# Patient Record
Sex: MALE | Race: WHITE | NOT HISPANIC OR LATINO | Employment: FULL TIME | URBAN - METROPOLITAN AREA
[De-identification: names, ages, dates, MRNs, and addresses within clinical notes are randomized per-mention and may not be internally consistent; named-entity substitution may affect disease eponyms.]

---

## 2017-09-26 DIAGNOSIS — E78.5 HYPERLIPIDEMIA, UNSPECIFIED HYPERLIPIDEMIA TYPE: ICD-10-CM

## 2017-09-26 DIAGNOSIS — F41.8 DEPRESSION WITH ANXIETY: ICD-10-CM

## 2017-09-27 RX ORDER — ESCITALOPRAM OXALATE 20 MG/1
20 TABLET ORAL DAILY
Qty: 30 TABLET | Refills: 0 | Status: SHIPPED | OUTPATIENT
Start: 2017-09-27 | End: 2017-10-24 | Stop reason: SDUPTHER

## 2017-09-27 RX ORDER — EZETIMIBE 10 MG/1
10 TABLET ORAL DAILY
Qty: 30 TABLET | Refills: 0 | Status: SHIPPED | OUTPATIENT
Start: 2017-09-27 | End: 2017-10-24 | Stop reason: SDUPTHER

## 2017-10-17 DIAGNOSIS — E78.5 HYPERLIPIDEMIA, UNSPECIFIED HYPERLIPIDEMIA TYPE: ICD-10-CM

## 2017-10-17 RX ORDER — ATORVASTATIN CALCIUM 80 MG/1
TABLET, FILM COATED ORAL
Qty: 30 TABLET | Refills: 0 | OUTPATIENT
Start: 2017-10-17

## 2017-10-24 DIAGNOSIS — F41.8 DEPRESSION WITH ANXIETY: ICD-10-CM

## 2017-10-24 DIAGNOSIS — E78.5 HYPERLIPIDEMIA, UNSPECIFIED HYPERLIPIDEMIA TYPE: ICD-10-CM

## 2017-10-25 RX ORDER — EZETIMIBE 10 MG/1
TABLET ORAL
Qty: 30 TABLET | Refills: 0 | Status: SHIPPED | OUTPATIENT
Start: 2017-10-25 | End: 2017-11-06 | Stop reason: SDUPTHER

## 2017-10-25 RX ORDER — ESCITALOPRAM OXALATE 20 MG/1
TABLET ORAL
Qty: 30 TABLET | Refills: 0 | Status: SHIPPED | OUTPATIENT
Start: 2017-10-25 | End: 2017-11-06 | Stop reason: HOSPADM

## 2017-11-06 ENCOUNTER — OFFICE VISIT (OUTPATIENT)
Dept: INTERNAL MEDICINE | Facility: CLINIC | Age: 57
End: 2017-11-06

## 2017-11-06 VITALS
HEIGHT: 70 IN | WEIGHT: 210 LBS | HEART RATE: 98 BPM | SYSTOLIC BLOOD PRESSURE: 110 MMHG | OXYGEN SATURATION: 99 % | DIASTOLIC BLOOD PRESSURE: 70 MMHG | BODY MASS INDEX: 30.06 KG/M2

## 2017-11-06 DIAGNOSIS — Z00.00 ROUTINE PHYSICAL EXAMINATION: ICD-10-CM

## 2017-11-06 DIAGNOSIS — E03.9 HYPOTHYROIDISM, UNSPECIFIED TYPE: Chronic | ICD-10-CM

## 2017-11-06 DIAGNOSIS — Z51.81 THERAPEUTIC DRUG MONITORING: ICD-10-CM

## 2017-11-06 DIAGNOSIS — Z11.59 NEED FOR HEPATITIS C SCREENING TEST: ICD-10-CM

## 2017-11-06 DIAGNOSIS — E78.5 HYPERLIPIDEMIA, UNSPECIFIED HYPERLIPIDEMIA TYPE: Chronic | ICD-10-CM

## 2017-11-06 DIAGNOSIS — E11.9 DIABETIC EYE EXAM (HCC): Chronic | ICD-10-CM

## 2017-11-06 DIAGNOSIS — Z23 NEED FOR TDAP VACCINATION: ICD-10-CM

## 2017-11-06 DIAGNOSIS — E55.9 VITAMIN D DEFICIENCY: Chronic | ICD-10-CM

## 2017-11-06 DIAGNOSIS — F41.8 DEPRESSION WITH ANXIETY: Chronic | ICD-10-CM

## 2017-11-06 DIAGNOSIS — Z01.00 DIABETIC EYE EXAM (HCC): Chronic | ICD-10-CM

## 2017-11-06 DIAGNOSIS — E11.9 TYPE 2 DIABETES MELLITUS WITHOUT COMPLICATION, WITHOUT LONG-TERM CURRENT USE OF INSULIN (HCC): Primary | Chronic | ICD-10-CM

## 2017-11-06 DIAGNOSIS — E11.9 ENCOUNTER FOR DIABETIC FOOT EXAM (HCC): Chronic | ICD-10-CM

## 2017-11-06 DIAGNOSIS — R80.9 MICROALBUMINURIA: Chronic | ICD-10-CM

## 2017-11-06 PROCEDURE — 99214 OFFICE O/P EST MOD 30 MIN: CPT | Performed by: INTERNAL MEDICINE

## 2017-11-06 RX ORDER — EZETIMIBE 10 MG/1
TABLET ORAL
Qty: 30 TABLET | Refills: 6 | Status: SHIPPED | OUTPATIENT
Start: 2017-11-06 | End: 2018-08-27 | Stop reason: SDUPTHER

## 2017-11-06 RX ORDER — LEVOTHYROXINE SODIUM 0.1 MG/1
TABLET ORAL
Qty: 90 TABLET | Refills: 3 | Status: SHIPPED | OUTPATIENT
Start: 2017-11-06 | End: 2018-11-15 | Stop reason: SDUPTHER

## 2017-11-06 RX ORDER — ATORVASTATIN CALCIUM 80 MG/1
TABLET, FILM COATED ORAL
Qty: 30 TABLET | Refills: 6 | Status: SHIPPED | OUTPATIENT
Start: 2017-11-06 | End: 2018-05-09

## 2017-11-06 NOTE — PROGRESS NOTES
11/06/2017    Patient Information  Prudencio Mckinley                                                                                          [address] [facility] [place] [address]      1960  [phone]    Chief Complaint:     Follow-up type 2 diabetes, microalbuminuria, hypothyroidism, depression with anxiety, vitamin D deficiency.  No new acute complaints.    History of Present Illness:    Patient with a history of medical problems as outlined in the chief complaint that have been fairly stable for at least the past year.  He presents today for a routine follow-up with lab prior in order to monitor his chronic medical issues.  His past medical history reviewed and updated where necessary including health maintenance parameters.  This reveals he is behind on several health maintenance issues including diabetic eye exam, hepatitis C screening, TDaP and urine microalbumin.    Review of Systems   Constitution: Negative.   HENT: Negative.    Eyes: Negative.    Cardiovascular: Negative.    Respiratory: Negative.    Endocrine: Negative.    Hematologic/Lymphatic: Negative.    Skin: Negative.    Musculoskeletal: Negative.    Gastrointestinal: Negative.    Genitourinary: Negative.    Neurological: Negative.    Psychiatric/Behavioral: Negative.    Allergic/Immunologic: Negative.        Active Problems:    Patient Active Problem List   Diagnosis   • Depression with anxiety   • Hiatal hernia   • Hyperlipidemia   • Hypothyroidism   • Microalbuminuria   • Type 2 diabetes mellitus   • Vitamin D deficiency   • Therapeutic drug monitoring   • Routine physical examination   • Diabetic foot exam   • Diabetic eye exam         Past Medical History:   Diagnosis Date   • Depression with anxiety 2/15/2011    02/15/2011--treatment for depression and anxiety begun with Lexapro 10 mg per day, Xanax 0.5 mg when necessary.   • Hiatal hernia 7/28/2011 07/28/2011--3 cm hiatal hernia, otherwise normal EGD.    • History of chest pain 2010 2010-- patient had a heart catheterization which was normal.   • History of coronary angiogram 2010 2010-- patient had a heart catheterization which was normal.   • History of nephrolithiasis Approximately 1985    Approximately 1985--patient passed a kidney stone stone spontaneously.   • History of peptic ulcer Approximately 1979    Approximately 1979--patient was told he had a peptic ulcer and was placed on Mylanta.  It sounds as if he had an EGD.   • History of Tibialis posterior tendinitis 2014 12/01/2014--patient seen in follow-up and report his symptoms have totally resolved. He continues to use the foot inserts.   08/04/2014--patient evaluated by the podiatrist and diagnosed with tibialis tendinitis. Treated with ankle block 1} and educated on strapping adjustments for posterior tibial tendinitis. Dispensed power steps. Continue Vimovo.   07/29/2014--x-ray left foot reveals an inferio   • Hyperlipidemia 6/22/2012 06/22/2012--treatment for hyperlipidemia begun.   • Hypothyroidism 4/27/2012 05/04/2012--treatment for hypothyroidism begun.  05/01/2012--ultrasound of the thyroid revealed increased hypervascularity, slightly heterogeneous echo.   04/27/2012--initial diagnosis of hypothyroidism.   • Microalbuminuria 5/15/2015    05/15/2015--urine microalbumin slightly elevated at 19.4. Normal range 0.0--17.0.   • Type 2 diabetes mellitus 4/1/2011    05/10/2013--initial diagnosis of mild diabetes. Hemoglobin A1c 6.6.  04/01/2011--mild impaired fasting glucose.      • Vitamin D deficiency 3/21/2016         Past Surgical History:   Procedure Laterality Date   • COLONOSCOPY  07/28/2011 07/28/2011--normal colonoscopy with an excellent prep.   • ESOPHAGOSCOPY / EGD  07/28/2011 07/28/2011--3 cm hiatal hernia, otherwise normal EGD.   • TONSILLECTOMY      11 years old.         No Known Allergies        Current Outpatient Prescriptions:   •  Ascorbic Acid (VITAMIN C PO), Take  " by mouth., Disp: , Rfl:   •  atorvastatin (LIPITOR) 80 MG tablet, 1 by mouth daily for cholesterol, Disp: 30 tablet, Rfl: 6  •  Cholecalciferol (VITAMIN D3) 5000 UNITS capsule capsule, Take  by mouth., Disp: , Rfl:   •  ezetimibe (ZETIA) 10 MG tablet, TAKE 1 TABLET BY MOUTH DAILY, Disp: 30 tablet, Rfl: 0  •  levothyroxine (SYNTHROID, LEVOTHROID) 100 MCG tablet, 1 by mouth daily for thyroid, Disp: 90 tablet, Rfl: 3  •  multivitamin (THERAGRAN) tablet tablet, Take 1 tablet by mouth daily., Disp: , Rfl:       Family History   Problem Relation Age of Onset   • Coronary artery disease Mother    • Diabetes Mother    • Hypertension Mother      Essential   • Hyperlipidemia Mother    • Diabetes Father    • Hypertension Father      Essential   • Hyperlipidemia Father    • Coronary artery disease Brother    • Diabetes Brother    • Hypertension Brother      Essential   • Hyperlipidemia Brother          Social History     Social History   • Marital status:      Spouse name: N/A   • Number of children: N/A   • Years of education: N/A     Occupational History   • [employer]       Social History Main Topics   • Smoking status: Never Smoker   • Smokeless tobacco: Never Used   • Alcohol use Yes      Comment: Moderately   • Drug use: No   • Sexual activity: Yes     Partners: Female     Other Topics Concern   • Not on file     Social History Narrative         Vitals:    11/06/17 1550   BP: 110/70   Pulse: 98   SpO2: 99%   Weight: 210 lb (95.3 kg)   Height: 70\" (177.8 cm)          Physical Exam:    General: Alert and oriented x 3. Obese.  No acute distress.  Normal affect.  HEENT: Pupils equal, round, reactive to light; extraocular movements intact; sclerae nonicteric; pharynx, ear canals and TMs normal.  Neck: Without JVD, thyromegaly, bruit, or adenopathy.  Lungs: Clear to auscultation in all fields.  Heart: Regular rate and rhythm without murmur, rub, gallop, or click.  Abdomen: Soft, nontender, without " hepatosplenomegaly or hernia.  Bowel sounds normal.  : Deferred.  Rectal: Deferred.  Extremities: Without clubbing, cyanosis, edema, or pulse deficit.  Neurologic: Intact without focal deficit.  Normal station and gait observed during ingress and egress from the examination room.  Skin: Without significant lesion.  Musculoskeletal: Unremarkable.      Lab/other results:    NMR reveals total cholesterol 169.  Triglycerides 107.  LDL particle number mildly elevated 1337.  Small LDL particle number elevated at 619.  HDL particle number is normal at 33.  CMP normal except blood sugar elevated 146, AST elevated at 48, ALT elevated at 72.  CBC is normal.  Hemoglobin A1c 7.1.  Thyroid function tests normal.  PSA normal.  Vitamin D normal.  CPK slightly elevated at 276.    Assessment/Plan:     Diagnosis Plan   1. Type 2 diabetes mellitus without complication, without long-term current use of insulin     2. Microalbuminuria     3. Hypothyroidism, unspecified type     4. Hyperlipidemia, unspecified hyperlipidemia type     5. Depression with anxiety     6. Vitamin D deficiency     7. Therapeutic drug monitoring     8. Routine physical examination     9. Diabetic foot exam     10. Diabetic eye exam         Patient has type 2 diabetes that is under reasonable control.  Microalbuminuria and needs to be reassessed in the near future.  Thyroid is therapeutic.  It appears his depression with anxiety has resolved.  We will continue to monitor.  Hyperlipidemia is under fair control.  Vitamin D therapeutic.    Plan is as follows:   We will schedule physical examination with lab prior to be done in 6 months. We will going to give patient TDaP vaccination but found out we are out of stock.  We can give that a later date.  Encouraged patient to get diabetic eye exam and have the eye doctor send me the report.  Refill medications.  No changes in medications other than to formerly discontinue the citalopram.      Procedures

## 2017-12-13 DIAGNOSIS — F41.8 DEPRESSION WITH ANXIETY: ICD-10-CM

## 2017-12-14 RX ORDER — ESCITALOPRAM OXALATE 20 MG/1
TABLET ORAL
Qty: 30 TABLET | Refills: 0 | OUTPATIENT
Start: 2017-12-14

## 2017-12-16 DIAGNOSIS — F41.8 DEPRESSION WITH ANXIETY: ICD-10-CM

## 2017-12-18 RX ORDER — ESCITALOPRAM OXALATE 20 MG/1
TABLET ORAL
Qty: 30 TABLET | Refills: 0 | OUTPATIENT
Start: 2017-12-18

## 2018-01-06 DIAGNOSIS — E03.9 HYPOTHYROIDISM, UNSPECIFIED TYPE: ICD-10-CM

## 2018-01-08 RX ORDER — LEVOTHYROXINE SODIUM 0.1 MG/1
TABLET ORAL
Qty: 90 TABLET | Refills: 1 | Status: SHIPPED | OUTPATIENT
Start: 2018-01-08 | End: 2018-05-09 | Stop reason: SDUPTHER

## 2018-05-01 DIAGNOSIS — E55.9 VITAMIN D DEFICIENCY: Chronic | ICD-10-CM

## 2018-05-01 DIAGNOSIS — Z11.59 NEED FOR HEPATITIS C SCREENING TEST: ICD-10-CM

## 2018-05-01 DIAGNOSIS — E11.9 TYPE 2 DIABETES MELLITUS WITHOUT COMPLICATION, WITHOUT LONG-TERM CURRENT USE OF INSULIN (HCC): Chronic | ICD-10-CM

## 2018-05-01 DIAGNOSIS — Z51.81 THERAPEUTIC DRUG MONITORING: ICD-10-CM

## 2018-05-01 DIAGNOSIS — E03.9 HYPOTHYROIDISM, UNSPECIFIED TYPE: Chronic | ICD-10-CM

## 2018-05-01 DIAGNOSIS — E78.5 HYPERLIPIDEMIA, UNSPECIFIED HYPERLIPIDEMIA TYPE: Chronic | ICD-10-CM

## 2018-05-01 DIAGNOSIS — R80.9 MICROALBUMINURIA: Chronic | ICD-10-CM

## 2018-05-04 LAB
25(OH)D3+25(OH)D2 SERPL-MCNC: 35.2 NG/ML (ref 30–100)
ALBUMIN SERPL-MCNC: 4.3 G/DL (ref 3.5–5.2)
ALBUMIN/CREAT UR: 3.1 MG/G CREAT (ref 0–30)
ALBUMIN/GLOB SERPL: 2 G/DL
ALP SERPL-CCNC: 54 U/L (ref 39–117)
ALT SERPL-CCNC: 32 U/L (ref 1–41)
AST SERPL-CCNC: 29 U/L (ref 1–40)
BILIRUB SERPL-MCNC: 0.3 MG/DL (ref 0.1–1.2)
BUN SERPL-MCNC: 16 MG/DL (ref 6–20)
BUN/CREAT SERPL: 17 (ref 7–25)
CALCIUM SERPL-MCNC: 9.4 MG/DL (ref 8.6–10.5)
CHLORIDE SERPL-SCNC: 102 MMOL/L (ref 98–107)
CHOLEST SERPL-MCNC: 184 MG/DL (ref 100–199)
CK SERPL-CCNC: 306 U/L (ref 20–200)
CO2 SERPL-SCNC: 26.8 MMOL/L (ref 22–29)
CREAT SERPL-MCNC: 0.94 MG/DL (ref 0.76–1.27)
CREAT UR-MCNC: 189 MG/DL
ERYTHROCYTE [DISTWIDTH] IN BLOOD BY AUTOMATED COUNT: 13.6 % (ref 11.5–14.5)
GFR SERPLBLD CREATININE-BSD FMLA CKD-EPI: 100 ML/MIN/1.73
GFR SERPLBLD CREATININE-BSD FMLA CKD-EPI: 83 ML/MIN/1.73
GLOBULIN SER CALC-MCNC: 2.2 GM/DL
GLUCOSE SERPL-MCNC: 128 MG/DL (ref 65–99)
HBA1C MFR BLD: 6.3 % (ref 4.8–5.6)
HCT VFR BLD AUTO: 46.2 % (ref 40.4–52.2)
HCV AB S/CO SERPL IA: <0.1 S/CO RATIO (ref 0–0.9)
HDL SERPL-SCNC: 35.8 UMOL/L
HDLC SERPL-MCNC: 51 MG/DL
HGB BLD-MCNC: 15.4 G/DL (ref 13.7–17.6)
LDL SERPL QN: 20.9 NM
LDL SERPL-SCNC: 1478 NMOL/L
LDL SMALL SERPL-SCNC: 593 NMOL/L
LDLC SERPL CALC-MCNC: 111 MG/DL (ref 0–99)
MCH RBC QN AUTO: 30.8 PG (ref 27–32.7)
MCHC RBC AUTO-ENTMCNC: 33.3 G/DL (ref 32.6–36.4)
MCV RBC AUTO: 92.4 FL (ref 79.8–96.2)
MICROALBUMIN UR-MCNC: 5.9 UG/ML
PLATELET # BLD AUTO: 204 10*3/MM3 (ref 140–500)
POTASSIUM SERPL-SCNC: 5.2 MMOL/L (ref 3.5–5.2)
PROT SERPL-MCNC: 6.5 G/DL (ref 6–8.5)
RBC # BLD AUTO: 5 10*6/MM3 (ref 4.6–6)
SODIUM SERPL-SCNC: 141 MMOL/L (ref 136–145)
T3FREE SERPL-MCNC: 3 PG/ML (ref 2–4.4)
T4 FREE SERPL-MCNC: 1.21 NG/DL (ref 0.93–1.7)
TRIGL SERPL-MCNC: 110 MG/DL (ref 0–149)
TSH SERPL DL<=0.005 MIU/L-ACNC: 0.94 MIU/ML (ref 0.27–4.2)
WBC # BLD AUTO: 8.05 10*3/MM3 (ref 4.5–10.7)

## 2018-05-09 ENCOUNTER — OFFICE VISIT (OUTPATIENT)
Dept: INTERNAL MEDICINE | Facility: CLINIC | Age: 58
End: 2018-05-09

## 2018-05-09 VITALS
BODY MASS INDEX: 29.75 KG/M2 | HEART RATE: 62 BPM | SYSTOLIC BLOOD PRESSURE: 119 MMHG | WEIGHT: 207.8 LBS | RESPIRATION RATE: 16 BRPM | OXYGEN SATURATION: 98 % | DIASTOLIC BLOOD PRESSURE: 71 MMHG | HEIGHT: 70 IN

## 2018-05-09 DIAGNOSIS — E11.9 ENCOUNTER FOR DIABETIC FOOT EXAM (HCC): Chronic | ICD-10-CM

## 2018-05-09 DIAGNOSIS — Z01.00 DIABETIC EYE EXAM (HCC): Chronic | ICD-10-CM

## 2018-05-09 DIAGNOSIS — R80.9 MICROALBUMINURIA: Chronic | ICD-10-CM

## 2018-05-09 DIAGNOSIS — Z51.81 THERAPEUTIC DRUG MONITORING: ICD-10-CM

## 2018-05-09 DIAGNOSIS — E03.9 HYPOTHYROIDISM, UNSPECIFIED TYPE: Chronic | ICD-10-CM

## 2018-05-09 DIAGNOSIS — Z23 NEED FOR PNEUMOCOCCAL VACCINATION: ICD-10-CM

## 2018-05-09 DIAGNOSIS — Z00.00 ROUTINE PHYSICAL EXAMINATION: Primary | ICD-10-CM

## 2018-05-09 DIAGNOSIS — E11.9 TYPE 2 DIABETES MELLITUS WITHOUT COMPLICATION, WITHOUT LONG-TERM CURRENT USE OF INSULIN (HCC): Chronic | ICD-10-CM

## 2018-05-09 DIAGNOSIS — E11.9 DIABETIC EYE EXAM (HCC): Chronic | ICD-10-CM

## 2018-05-09 DIAGNOSIS — Z23 NEED FOR TDAP VACCINATION: ICD-10-CM

## 2018-05-09 DIAGNOSIS — E78.5 HYPERLIPIDEMIA, UNSPECIFIED HYPERLIPIDEMIA TYPE: Chronic | ICD-10-CM

## 2018-05-09 DIAGNOSIS — E55.9 VITAMIN D DEFICIENCY: Chronic | ICD-10-CM

## 2018-05-09 PROCEDURE — 90715 TDAP VACCINE 7 YRS/> IM: CPT | Performed by: INTERNAL MEDICINE

## 2018-05-09 PROCEDURE — 99396 PREV VISIT EST AGE 40-64: CPT | Performed by: INTERNAL MEDICINE

## 2018-05-09 PROCEDURE — 90471 IMMUNIZATION ADMIN: CPT | Performed by: INTERNAL MEDICINE

## 2018-05-09 RX ORDER — ATORVASTATIN CALCIUM 20 MG/1
TABLET, FILM COATED ORAL
Qty: 30 TABLET | Refills: 11 | Status: SHIPPED | OUTPATIENT
Start: 2018-05-09 | End: 2019-05-17 | Stop reason: SDUPTHER

## 2018-05-09 NOTE — PROGRESS NOTES
05/09/2018    Patient Information  Prudencio Mckinley                                                                                          [address] [facility] [place] [address]      1960  [phone]    Chief Complaint:     Routine annual physical examination and follow-up lab work.  No new acute complaints.    History of Present Illness:    Patient with a history of type 2 diabetes, hyperlipidemia, hypothyroidism, microalbuminuria, vitamin D deficiency.  He presents today for his routine annual exam and follow-up lab work.  Patient has a history of hyperlipidemia and was previously on Zetia along with 80 mg of Lipitor.  I did not feel he needed quite that much medication and the Lipitor was discontinued and we will reassess the lipid profile today.  His past medical history reviewed and updated where necessary including health maintenance parameters.  This reveals he needs his physical which we are doing today.  He also needs a TDaP and PPSV 23 which we can give today.  He also needs a diabetic eye exam which I encouraged him to do so.    Review of Systems   Constitution: Negative.   HENT: Negative.    Eyes: Negative.    Cardiovascular: Negative.    Respiratory: Negative.    Endocrine: Negative.    Hematologic/Lymphatic: Negative.    Skin: Negative.    Musculoskeletal: Negative.    Gastrointestinal: Negative.    Genitourinary: Negative.    Neurological: Negative.    Psychiatric/Behavioral: Negative.    Allergic/Immunologic: Negative.        Active Problems:    Patient Active Problem List   Diagnosis   • Depression with anxiety   • Hiatal hernia   • Hyperlipidemia   • Hypothyroidism   • Microalbuminuria   • Type 2 diabetes mellitus   • Vitamin D deficiency   • Therapeutic drug monitoring   • Routine physical examination   • Diabetic foot exam   • Diabetic eye exam         Past Medical History:   Diagnosis Date   • Depression with anxiety 2/15/2011    02/15/2011--treatment for  depression and anxiety begun with Lexapro 10 mg per day, Xanax 0.5 mg when necessary.   • Hiatal hernia 7/28/2011 07/28/2011--3 cm hiatal hernia, otherwise normal EGD.   • History of chest pain 2010 2010-- patient had a heart catheterization which was normal.   • History of coronary angiogram 2010 2010-- patient had a heart catheterization which was normal.   • History of nephrolithiasis Approximately 1985    Approximately 1985--patient passed a kidney stone stone spontaneously.   • History of peptic ulcer Approximately 1979    Approximately 1979--patient was told he had a peptic ulcer and was placed on Mylanta.  It sounds as if he had an EGD.   • History of Tibialis posterior tendinitis 2014 12/01/2014--patient seen in follow-up and report his symptoms have totally resolved. He continues to use the foot inserts.   08/04/2014--patient evaluated by the podiatrist and diagnosed with tibialis tendinitis. Treated with ankle block 1} and educated on strapping adjustments for posterior tibial tendinitis. Dispensed power steps. Continue Vimovo.   07/29/2014--x-ray left foot reveals an inferio   • Hyperlipidemia 6/22/2012 06/22/2012--treatment for hyperlipidemia begun.   • Hypothyroidism 4/27/2012 05/04/2012--treatment for hypothyroidism begun.  05/01/2012--ultrasound of the thyroid revealed increased hypervascularity, slightly heterogeneous echo.   04/27/2012--initial diagnosis of hypothyroidism.   • Microalbuminuria 5/15/2015    05/15/2015--urine microalbumin slightly elevated at 19.4. Normal range 0.0--17.0.   • Type 2 diabetes mellitus 4/1/2011    05/10/2013--initial diagnosis of mild diabetes. Hemoglobin A1c 6.6.  04/01/2011--mild impaired fasting glucose.      • Vitamin D deficiency 3/21/2016         Past Surgical History:   Procedure Laterality Date   • COLONOSCOPY  07/28/2011 07/28/2011--normal colonoscopy with an excellent prep.   • ESOPHAGOSCOPY / EGD  07/28/2011 07/28/2011--3 cm hiatal  "hernia, otherwise normal EGD.   • TONSILLECTOMY      11 years old.         No Known Allergies        Current Outpatient Prescriptions:   •  Ascorbic Acid (VITAMIN C PO), Take  by mouth., Disp: , Rfl:   •  Cholecalciferol (VITAMIN D3) 5000 UNITS capsule capsule, Take  by mouth., Disp: , Rfl:   •  ezetimibe (ZETIA) 10 MG tablet, 1 by mouth daily for cholesterol, Disp: 30 tablet, Rfl: 6  •  levothyroxine (SYNTHROID, LEVOTHROID) 100 MCG tablet, 1 by mouth daily for thyroid, Disp: 90 tablet, Rfl: 3  •  multivitamin (THERAGRAN) tablet tablet, Take 1 tablet by mouth daily., Disp: , Rfl:   •  atorvastatin (LIPITOR) 20 MG tablet, One by mouth daily for cholesterol, Disp: 30 tablet, Rfl: 11      Family History   Problem Relation Age of Onset   • Coronary artery disease Mother    • Diabetes Mother    • Hypertension Mother      Essential   • Hyperlipidemia Mother    • Diabetes Father    • Hypertension Father      Essential   • Hyperlipidemia Father    • Coronary artery disease Brother    • Diabetes Brother    • Hypertension Brother      Essential   • Hyperlipidemia Brother          Social History     Social History   • Marital status:      Spouse name: N/A   • Number of children: N/A   • Years of education: N/A     Occupational History   • [employer]      Social History Main Topics   • Smoking status: Never Smoker   • Smokeless tobacco: Never Used   • Alcohol use Yes      Comment: Moderately   • Drug use: No   • Sexual activity: Yes     Partners: Female     Other Topics Concern   • Not on file     Social History Narrative   • No narrative on file         Vitals:    05/09/18 1459   BP: 119/71   BP Location: Right arm   Patient Position: Sitting   Cuff Size: Adult   Pulse: 62   Resp: 16   SpO2: 98%   Weight: 94.3 kg (207 lb 12.8 oz)   Height: 177.8 cm (70\")          Physical Exam:    General: Alert and oriented x 3, with appropriate affect; no acute distress.  HEENT: pupils equal, round, and reactive to light; " extraocular movements intact; sclera nonicteric; nasal mucosa normal; pharynx normal; tympanic membranes and ear canals normal.  Neck: without JVD, thyromegaly, bruit, or adenopathy.  Lungs: clear to auscultation in all fields.  Heart: auscultation reveals regular rate and rhythm without murmur, rub, gallop, or click.  Abdomen: is soft and nontender, without hepato-splenomegaly, mass or hernia. Normal bowel sounds; .  Urologic exam: reveals normal male genitalia without testicular mass or penile/scrotal lesion.  Digital rectal exam and Prostate: deferred.  Extremities: are without clubbing, cyanosis, or edema.  Vascular: no signs of peripheral arterial disease or venous insufficiency/varicosities.  Neurological: intact without focal deficit, including cranial and peripheral nerves.  Station and gait observed to be normal during ingress and egress from the examination area.  Sensation and deep tendon reflexes tested if clinically indicated and are normal.  Musculoskeletal: exam is normal, without signs of synovitis, significant degeneration or deformity. Skin examination: without rash or significant lesions.    05/09/2018--routine diabetic foot exam reveals no evidence of diabetic foot ulcer or pre-ulcerative callus.  He does have some dry skin and hyperkeratosis and I encouraged him to use some lotion.  Distal pulses are palpable.  There is no signs of ischemia.  Sensation subjectively intact.      Lab/other results:    NMR reveals total cholesterol 184.  Triglycerides 110.  LDL particle number elevated 1478.  Small LDL particle number elevated at 593.  HDL particle number normal at 35.8.  CMP normal except blood sugar elevated at 128.  CBC is normal.  Albumin/creatinine ratio normal at 3.1.  Hemoglobin A1c excellent at 6.3.  Thyroid function tests normal.  Vitamin D normal.  Hepatitis C antibody screening is negative.  CPK slightly elevated at 306.    Assessment/Plan:     Diagnosis Plan   1. Routine physical  examination     2. Type 2 diabetes mellitus without complication, without long-term current use of insulin     3. Hyperlipidemia, unspecified hyperlipidemia type  atorvastatin (LIPITOR) 20 MG tablet   4. Hypothyroidism, unspecified type     5. Microalbuminuria     6. Vitamin D deficiency     7. Diabetic foot exam     8. Diabetic eye exam     9. Therapeutic drug monitoring     10. Need for pneumococcal vaccination     11. Need for Tdap vaccination       Patient presents with essentially normal annual physical except for the following issues: He has type 2 diabetes that is much better than it was previously and technically he now has impaired fasting glucose although I will not change the diagnosis at the present time.  He has hyperlipidemia that is not quite at goal and I do not think he needs 80 mg of Lipitor.  He should get by with a much lower dose.  Thyroid is therapeutic.  Microalbuminuria is nonexistent at the present time.  Vitamin D is therapeutic.  Diabetic foot exam is documented above.    Plan is as follows: Patient should continue diet and weight loss efforts along with exercise.  TDaP and PPSV 23 given today.  Patient should wait about a week or so before getting the hepatitis A vaccination at the local drug store.  Start generic Lipitor 20 mg per day.  Patient will follow-up in 6 months with lab prior or follow-up as needed.    At the end of the visit I realize the PSA was not done.  His last PSA was done about 6 months ago but I will repeat one today.  Patient will follow-up on the phone for the results.    Procedures

## 2018-05-10 LAB — PSA SERPL-MCNC: 1.03 NG/ML (ref 0–4)

## 2018-08-27 DIAGNOSIS — E78.5 HYPERLIPIDEMIA, UNSPECIFIED HYPERLIPIDEMIA TYPE: Chronic | ICD-10-CM

## 2018-08-28 RX ORDER — EZETIMIBE 10 MG/1
TABLET ORAL
Qty: 30 TABLET | Refills: 4 | Status: SHIPPED | OUTPATIENT
Start: 2018-08-28 | End: 2019-02-09 | Stop reason: SDUPTHER

## 2018-11-01 DIAGNOSIS — E78.5 HYPERLIPIDEMIA, UNSPECIFIED HYPERLIPIDEMIA TYPE: Chronic | ICD-10-CM

## 2018-11-01 DIAGNOSIS — E03.9 HYPOTHYROIDISM, UNSPECIFIED TYPE: Chronic | ICD-10-CM

## 2018-11-01 DIAGNOSIS — E11.9 TYPE 2 DIABETES MELLITUS WITHOUT COMPLICATION, WITHOUT LONG-TERM CURRENT USE OF INSULIN (HCC): Chronic | ICD-10-CM

## 2018-11-05 LAB
ALBUMIN SERPL-MCNC: 4.3 G/DL (ref 3.5–5.2)
ALBUMIN/GLOB SERPL: 1.9 G/DL
ALP SERPL-CCNC: 56 U/L (ref 39–117)
ALT SERPL-CCNC: 48 U/L (ref 1–41)
AST SERPL-CCNC: 28 U/L (ref 1–40)
BILIRUB SERPL-MCNC: 0.3 MG/DL (ref 0.1–1.2)
BUN SERPL-MCNC: 13 MG/DL (ref 6–20)
BUN/CREAT SERPL: 14.8 (ref 7–25)
CALCIUM SERPL-MCNC: 9.6 MG/DL (ref 8.6–10.5)
CHLORIDE SERPL-SCNC: 102 MMOL/L (ref 98–107)
CHOLEST SERPL-MCNC: 148 MG/DL (ref 100–199)
CK SERPL-CCNC: 112 U/L (ref 20–200)
CO2 SERPL-SCNC: 26.8 MMOL/L (ref 22–29)
CREAT SERPL-MCNC: 0.88 MG/DL (ref 0.76–1.27)
GLOBULIN SER CALC-MCNC: 2.3 GM/DL
GLUCOSE SERPL-MCNC: 143 MG/DL (ref 65–99)
HBA1C MFR BLD: 6.8 % (ref 4.8–5.6)
HDL SERPL-SCNC: 43.2 UMOL/L
HDLC SERPL-MCNC: 56 MG/DL
LDL SERPL QN: 20 NM
LDL SERPL-SCNC: 1040 NMOL/L
LDL SMALL SERPL-SCNC: 798 NMOL/L
LDLC SERPL CALC-MCNC: 57 MG/DL (ref 0–99)
POTASSIUM SERPL-SCNC: 5.2 MMOL/L (ref 3.5–5.2)
PROT SERPL-MCNC: 6.6 G/DL (ref 6–8.5)
SODIUM SERPL-SCNC: 142 MMOL/L (ref 136–145)
T3FREE SERPL-MCNC: 3.2 PG/ML (ref 2–4.4)
T4 FREE SERPL-MCNC: 1.37 NG/DL (ref 0.93–1.7)
TRIGL SERPL-MCNC: 175 MG/DL (ref 0–149)
TSH SERPL DL<=0.005 MIU/L-ACNC: 0.98 MIU/ML (ref 0.27–4.2)

## 2018-11-09 ENCOUNTER — OFFICE VISIT (OUTPATIENT)
Dept: INTERNAL MEDICINE | Facility: CLINIC | Age: 58
End: 2018-11-09

## 2018-11-09 VITALS
SYSTOLIC BLOOD PRESSURE: 122 MMHG | BODY MASS INDEX: 29.15 KG/M2 | HEART RATE: 77 BPM | OXYGEN SATURATION: 98 % | DIASTOLIC BLOOD PRESSURE: 70 MMHG | HEIGHT: 70 IN | WEIGHT: 203.6 LBS

## 2018-11-09 DIAGNOSIS — E55.9 VITAMIN D DEFICIENCY: Chronic | ICD-10-CM

## 2018-11-09 DIAGNOSIS — R80.9 MICROALBUMINURIA: Chronic | ICD-10-CM

## 2018-11-09 DIAGNOSIS — Z51.81 THERAPEUTIC DRUG MONITORING: ICD-10-CM

## 2018-11-09 DIAGNOSIS — E78.5 HYPERLIPIDEMIA, UNSPECIFIED HYPERLIPIDEMIA TYPE: Chronic | ICD-10-CM

## 2018-11-09 DIAGNOSIS — E03.9 HYPOTHYROIDISM, UNSPECIFIED TYPE: Chronic | ICD-10-CM

## 2018-11-09 DIAGNOSIS — E11.9 TYPE 2 DIABETES MELLITUS WITHOUT COMPLICATION, WITHOUT LONG-TERM CURRENT USE OF INSULIN (HCC): Primary | Chronic | ICD-10-CM

## 2018-11-09 DIAGNOSIS — Z00.00 ROUTINE PHYSICAL EXAMINATION: ICD-10-CM

## 2018-11-09 PROCEDURE — 99214 OFFICE O/P EST MOD 30 MIN: CPT | Performed by: INTERNAL MEDICINE

## 2018-11-09 NOTE — PROGRESS NOTES
11/09/2018    Patient Information  Prudencio Mckinley                                                                                          [address] [facility] [place] [address]      1960  [phone] (work)    Chief Complaint:     Follow-up type 2 diabetes, hyperlipidemia, hypothyroidism, microalbuminuria, vitamin D deficiency.  No new acute complaints.    History of Present Illness:    Patient with a history of medical problems as outlined in chief complaint presents today for a follow-up with lab prior in order to monitor his chronic medical issues.  He currently is feeling well and has no new acute complaints.  Patient is active and works every day as .  His past medical history reviewed and updated where necessary including health maintenance parameters.  This reveals he is up-to-date or else accounted for with the exception of the new shingles vaccine.  I recommended that he obtain this at the local drug store as soon it is available and off back order.    Review of Systems   Constitution: Negative.   HENT: Negative.    Eyes: Negative.    Cardiovascular: Negative.    Respiratory: Negative.    Endocrine: Negative.    Hematologic/Lymphatic: Negative.    Skin: Negative.    Musculoskeletal: Negative.    Gastrointestinal: Negative.    Genitourinary: Negative.    Neurological: Negative.    Psychiatric/Behavioral: Negative.    Allergic/Immunologic: Negative.        Active Problems:    Patient Active Problem List   Diagnosis   • Depression with anxiety   • Hiatal hernia   • Hyperlipidemia   • Hypothyroidism   • Microalbuminuria   • Type 2 diabetes mellitus (CMS/HCC)   • Vitamin D deficiency   • Therapeutic drug monitoring   • Routine physical examination   • Diabetic foot exam   • Diabetic eye exam (CMS/HCC)         Past Medical History:   Diagnosis Date   • Depression with anxiety 2/15/2011    02/15/2011--treatment for depression and anxiety begun with Lexapro 10 mg  per day, Xanax 0.5 mg when necessary.   • Hiatal hernia 7/28/2011 07/28/2011--3 cm hiatal hernia, otherwise normal EGD.   • History of coronary angiogram 2010 2010-- patient had a heart catheterization which was normal.   • History of nephrolithiasis Approximately 1985    Approximately 1985--patient passed a kidney stone stone spontaneously.   • History of peptic ulcer Approximately 1979    Approximately 1979--patient was told he had a peptic ulcer and was placed on Mylanta.  It sounds as if he had an EGD.   • Hyperlipidemia 6/22/2012 06/22/2012--treatment for hyperlipidemia begun.   • Hypothyroidism 4/27/2012 05/04/2012--treatment for hypothyroidism begun.  05/01/2012--ultrasound of the thyroid revealed increased hypervascularity, slightly heterogeneous echo.   04/27/2012--initial diagnosis of hypothyroidism.   • Microalbuminuria 5/15/2015    05/15/2015--urine microalbumin slightly elevated at 19.4. Normal range 0.0--17.0.   • Type 2 diabetes mellitus (CMS/[facility]) 4/1/2011    05/10/2013--initial diagnosis of mild diabetes. Hemoglobin A1c 6.6.  04/01/2011--mild impaired fasting glucose.      • Vitamin D deficiency 3/21/2016         Past Surgical History:   Procedure Laterality Date   • COLONOSCOPY  07/28/2011 07/28/2011--normal colonoscopy with an excellent prep.   • ESOPHAGOSCOPY / EGD  07/28/2011 07/28/2011--3 cm hiatal hernia, otherwise normal EGD.   • TONSILLECTOMY      11 years old.         No Known Allergies        Current Outpatient Prescriptions:   •  Ascorbic Acid (VITAMIN C PO), Take  by mouth., Disp: , Rfl:   •  atorvastatin (LIPITOR) 20 MG tablet, One by mouth daily for cholesterol, Disp: 30 tablet, Rfl: 11  •  Cholecalciferol (VITAMIN D3) 5000 UNITS capsule capsule, Take  by mouth., Disp: , Rfl:   •  ezetimibe (ZETIA) 10 MG tablet, TAKE 1 TABLET BY MOUTH DAILY FOR CHOLESTEROL, Disp: 30 tablet, Rfl: 4  •  levothyroxine (SYNTHROID, LEVOTHROID) 100 MCG tablet, 1 by mouth daily for thyroid,  "Disp: 90 tablet, Rfl: 3  •  multivitamin (THERAGRAN) tablet tablet, Take 1 tablet by mouth daily., Disp: , Rfl:       Family History   Problem Relation Age of Onset   • Coronary artery disease Mother    • Diabetes Mother    • Hypertension Mother         Essential   • Hyperlipidemia Mother    • Diabetes Father    • Hypertension Father         Essential   • Hyperlipidemia Father    • Coronary artery disease Brother    • Diabetes Brother    • Hypertension Brother         Essential   • Hyperlipidemia Brother          Social History     Social History   • Marital status:      Spouse name: N/A   • Number of children: N/A   • Years of education: N/A     Occupational History   • [employer]      Social History Main Topics   • Smoking status: Never Smoker   • Smokeless tobacco: Never Used   • Alcohol use Yes      Comment: Moderately   • Drug use: No   • Sexual activity: Yes     Partners: Female     Other Topics Concern   • Not on file     Social History Narrative   • No narrative on file         Vitals:    11/09/18 1606   BP: 122/70   BP Location: Right arm   Pulse: 77   SpO2: 98%   Weight: 92.4 kg (203 lb 9.6 oz)   Height: 177.8 cm (70\")          Physical Exam:    General: Alert and oriented x 3.  No acute distress.  Normal affect.  HEENT: Pupils equal, round, reactive to light; extraocular movements intact; sclerae nonicteric; pharynx, ear canals and TMs normal.  Neck: Without JVD, thyromegaly, bruit, or adenopathy.  Lungs: Clear to auscultation in all fields.  Heart: Regular rate and rhythm without murmur, rub, gallop, or click.  Abdomen: Soft, nontender, without hepatosplenomegaly or hernia.  Bowel sounds normal.  : Deferred.  Rectal: Deferred.  Extremities: Without clubbing, cyanosis, edema, or pulse deficit.  Neurologic: Intact without focal deficit.  Normal station and gait observed during ingress and egress from the examination room.  Skin: Without significant lesion.  Musculoskeletal: " Unremarkable.      Lab/other results:    NMR reveals total cholesterol 148.  Glycerides mildly elevated at 175.  LDL particle number borderline elevated 1040.  Small LDL particle number elevated at 798.  HDL particle number is very good at 43.2.  CMP normal except blood sugar elevated at 143.  ALT is slightly elevated at 48.  Hemoglobin A1c elevated at 6.8.  Thyroid function tests are normal.    Assessment/Plan:     Diagnosis Plan   1. Type 2 diabetes mellitus without complication, without long-term current use of insulin (CMS/[facility])     2. Hyperlipidemia, unspecified hyperlipidemia type     3. Hypothyroidism, unspecified type     4. Microalbuminuria     5. Vitamin D deficiency     6. Therapeutic drug monitoring     7. Routine physical examination       Patient has type 2 diabetes that is mild and does not require medication.  Hyperlipidemia is under reasonable control.  Thyroid is therapeutic on the current dose.  Microalbuminuria has been mild and stable.  Vitamin D has been therapeutic.    Plan is as follows: Strongly recommend patient get diabetic eye exam.  Recommend diet, exercise, and weight loss.  I will have him follow-up after 05/09/2019 with lab prior for his annual physical exam.  If things appear to be in good control at that time that I think it would be reasonable for him to follow-up on a yearly basis as long as he can keep his hemoglobin A1c less than 7.        Procedures

## 2018-11-15 DIAGNOSIS — E03.9 HYPOTHYROIDISM, UNSPECIFIED TYPE: Chronic | ICD-10-CM

## 2018-11-15 RX ORDER — LEVOTHYROXINE SODIUM 0.1 MG/1
TABLET ORAL
Qty: 90 TABLET | Refills: 1 | Status: SHIPPED | OUTPATIENT
Start: 2018-11-15 | End: 2019-05-27 | Stop reason: SDUPTHER

## 2019-02-09 DIAGNOSIS — E78.5 HYPERLIPIDEMIA, UNSPECIFIED HYPERLIPIDEMIA TYPE: Chronic | ICD-10-CM

## 2019-02-11 RX ORDER — EZETIMIBE 10 MG/1
TABLET ORAL
Qty: 30 TABLET | Refills: 3 | Status: SHIPPED | OUTPATIENT
Start: 2019-02-11 | End: 2019-06-18 | Stop reason: SDUPTHER

## 2019-05-08 DIAGNOSIS — E78.5 HYPERLIPIDEMIA, UNSPECIFIED HYPERLIPIDEMIA TYPE: Chronic | ICD-10-CM

## 2019-05-08 DIAGNOSIS — R80.9 MICROALBUMINURIA: Chronic | ICD-10-CM

## 2019-05-08 DIAGNOSIS — E11.9 TYPE 2 DIABETES MELLITUS WITHOUT COMPLICATION, WITHOUT LONG-TERM CURRENT USE OF INSULIN (HCC): Chronic | ICD-10-CM

## 2019-05-08 DIAGNOSIS — E03.9 HYPOTHYROIDISM, UNSPECIFIED TYPE: Chronic | ICD-10-CM

## 2019-05-08 DIAGNOSIS — E55.9 VITAMIN D DEFICIENCY: Chronic | ICD-10-CM

## 2019-05-08 DIAGNOSIS — Z00.00 ROUTINE PHYSICAL EXAMINATION: ICD-10-CM

## 2019-05-10 LAB
25(OH)D3+25(OH)D2 SERPL-MCNC: 38.5 NG/ML (ref 30–100)
ALBUMIN SERPL-MCNC: 4.3 G/DL (ref 3.5–5.2)
ALBUMIN/CREAT UR: 3.6 MG/G CREAT (ref 0–30)
ALBUMIN/GLOB SERPL: 1.9 G/DL
ALP SERPL-CCNC: 66 U/L (ref 39–117)
ALT SERPL-CCNC: 55 U/L (ref 1–41)
AST SERPL-CCNC: 42 U/L (ref 1–40)
BILIRUB SERPL-MCNC: 0.4 MG/DL (ref 0.2–1.2)
BUN SERPL-MCNC: 12 MG/DL (ref 6–20)
BUN/CREAT SERPL: 13.5 (ref 7–25)
CALCIUM SERPL-MCNC: 10.6 MG/DL (ref 8.6–10.5)
CHLORIDE SERPL-SCNC: 104 MMOL/L (ref 98–107)
CHOLEST SERPL-MCNC: 133 MG/DL (ref 100–199)
CK SERPL-CCNC: 801 U/L (ref 20–200)
CO2 SERPL-SCNC: 27.7 MMOL/L (ref 22–29)
CREAT SERPL-MCNC: 0.89 MG/DL (ref 0.76–1.27)
CREAT UR-MCNC: 96.9 MG/DL
ERYTHROCYTE [DISTWIDTH] IN BLOOD BY AUTOMATED COUNT: 12.8 % (ref 12.3–15.4)
GLOBULIN SER CALC-MCNC: 2.3 GM/DL
GLUCOSE SERPL-MCNC: 130 MG/DL (ref 65–99)
HBA1C MFR BLD: 7.2 % (ref 4.8–5.6)
HCT VFR BLD AUTO: 49.1 % (ref 37.5–51)
HDL SERPL-SCNC: 33.4 UMOL/L
HDLC SERPL-MCNC: 50 MG/DL
HGB BLD-MCNC: 15.9 G/DL (ref 13–17.7)
LDL SERPL QN: 20.3 NM
LDL SERPL-SCNC: 942 NMOL/L
LDL SMALL SERPL-SCNC: 519 NMOL/L
LDLC SERPL CALC-MCNC: 63 MG/DL (ref 0–99)
MCH RBC QN AUTO: 29.6 PG (ref 26.6–33)
MCHC RBC AUTO-ENTMCNC: 32.4 G/DL (ref 31.5–35.7)
MCV RBC AUTO: 91.4 FL (ref 79–97)
MICROALBUMIN UR-MCNC: 3.5 UG/ML
PLATELET # BLD AUTO: 216 10*3/MM3 (ref 140–450)
POTASSIUM SERPL-SCNC: 5.9 MMOL/L (ref 3.5–5.2)
PROT SERPL-MCNC: 6.6 G/DL (ref 6–8.5)
PSA SERPL-MCNC: 0.94 NG/ML (ref 0–4)
RBC # BLD AUTO: 5.37 10*6/MM3 (ref 4.14–5.8)
SODIUM SERPL-SCNC: 143 MMOL/L (ref 136–145)
T3FREE SERPL-MCNC: 3.4 PG/ML (ref 2–4.4)
T4 FREE SERPL-MCNC: 1.34 NG/DL (ref 0.93–1.7)
TRIGL SERPL-MCNC: 101 MG/DL (ref 0–149)
TSH SERPL DL<=0.005 MIU/L-ACNC: 1.65 MIU/ML (ref 0.27–4.2)
WBC # BLD AUTO: 7.83 10*3/MM3 (ref 3.4–10.8)

## 2019-05-17 DIAGNOSIS — E78.5 HYPERLIPIDEMIA, UNSPECIFIED HYPERLIPIDEMIA TYPE: Chronic | ICD-10-CM

## 2019-05-18 RX ORDER — ATORVASTATIN CALCIUM 20 MG/1
TABLET, FILM COATED ORAL
Qty: 30 TABLET | Refills: 0 | Status: SHIPPED | OUTPATIENT
Start: 2019-05-18 | End: 2019-06-18 | Stop reason: SDUPTHER

## 2019-05-27 DIAGNOSIS — E03.9 HYPOTHYROIDISM, UNSPECIFIED TYPE: Chronic | ICD-10-CM

## 2019-05-28 RX ORDER — LEVOTHYROXINE SODIUM 0.1 MG/1
TABLET ORAL
Qty: 90 TABLET | Refills: 0 | Status: SHIPPED | OUTPATIENT
Start: 2019-05-28 | End: 2019-09-07 | Stop reason: SDUPTHER

## 2019-05-29 ENCOUNTER — OFFICE VISIT (OUTPATIENT)
Dept: INTERNAL MEDICINE | Facility: CLINIC | Age: 59
End: 2019-05-29

## 2019-05-29 VITALS
OXYGEN SATURATION: 98 % | WEIGHT: 200.2 LBS | HEART RATE: 64 BPM | BODY MASS INDEX: 28.66 KG/M2 | SYSTOLIC BLOOD PRESSURE: 112 MMHG | HEIGHT: 70 IN | DIASTOLIC BLOOD PRESSURE: 72 MMHG

## 2019-05-29 DIAGNOSIS — E11.9 TYPE 2 DIABETES MELLITUS WITHOUT COMPLICATION, WITHOUT LONG-TERM CURRENT USE OF INSULIN (HCC): Chronic | ICD-10-CM

## 2019-05-29 DIAGNOSIS — R74.8 ELEVATED CPK: ICD-10-CM

## 2019-05-29 DIAGNOSIS — E78.5 HYPERLIPIDEMIA, UNSPECIFIED HYPERLIPIDEMIA TYPE: Chronic | ICD-10-CM

## 2019-05-29 DIAGNOSIS — Z00.00 ROUTINE PHYSICAL EXAMINATION: Primary | ICD-10-CM

## 2019-05-29 DIAGNOSIS — E83.52 HYPERCALCEMIA: ICD-10-CM

## 2019-05-29 DIAGNOSIS — R80.9 MICROALBUMINURIA: Chronic | ICD-10-CM

## 2019-05-29 DIAGNOSIS — E11.9 ENCOUNTER FOR DIABETIC FOOT EXAM (HCC): Chronic | ICD-10-CM

## 2019-05-29 DIAGNOSIS — E03.9 HYPOTHYROIDISM, UNSPECIFIED TYPE: Chronic | ICD-10-CM

## 2019-05-29 DIAGNOSIS — E11.9 DIABETIC EYE EXAM (HCC): Chronic | ICD-10-CM

## 2019-05-29 DIAGNOSIS — E87.5 HYPERKALEMIA: ICD-10-CM

## 2019-05-29 DIAGNOSIS — E55.9 VITAMIN D DEFICIENCY: Chronic | ICD-10-CM

## 2019-05-29 DIAGNOSIS — Z51.81 THERAPEUTIC DRUG MONITORING: ICD-10-CM

## 2019-05-29 DIAGNOSIS — Z01.00 DIABETIC EYE EXAM (HCC): Chronic | ICD-10-CM

## 2019-05-29 PROCEDURE — 99396 PREV VISIT EST AGE 40-64: CPT | Performed by: INTERNAL MEDICINE

## 2019-05-29 NOTE — PROGRESS NOTES
05/29/2019    Patient Information  Prudencio Mckinley                                                                                          [address] [place] [address]      1960  [unfilled]  [phone] (work)    Chief Complaint:     Routine annual physical examination and follow-up lab work.  Complaining of recent chest congestion and cough that is getting better and we will not addressed formally.    History of Present Illness:    Patient with a history of hyperlipidemia, type 2 diabetes, hypothyroidism, microalbuminuria and hiatal hernia.  He presents today for his routine annual physical exam and follow-up lab work.  He reports he is feeling well except a recent episode of what sounds like bronchitis.  Patient breathing then some mariluz material at work and subsequently developed chest congestion and cough productive of green phlegm.  It is much better now and patient does not feel like we need to address or treat this.  Indeed, his lungs are clear today.  His past medical history reviewed and updated were necessary including health maintenance parameters.  This reveals he will be up-to-date or else accounted for after today's visit.  Patient did have a diabetic eye exam but it is not documented in the computer and we are obtaining that information.    Review of Systems   Constitution: Negative.   HENT: Negative.    Eyes: Negative.    Cardiovascular: Negative.    Respiratory: Positive for cough and sputum production.    Endocrine: Negative.    Hematologic/Lymphatic: Negative.    Skin: Negative.    Musculoskeletal: Negative.    Gastrointestinal: Negative.    Genitourinary: Negative.    Neurological: Negative.    Psychiatric/Behavioral: Negative.    Allergic/Immunologic: Negative.        Active Problems:    Patient Active Problem List   Diagnosis   • Hiatal hernia   • Hyperlipidemia   • Hypothyroidism   • Microalbuminuria   • Type 2 diabetes mellitus (CMS/[facility])   • Vitamin D deficiency   •  Therapeutic drug monitoring   • Routine physical examination   • Diabetic foot exam   • Diabetic eye exam (CMS/[facility])   • Hypercalcemia   • Hyperkalemia   • Elevated CPK         Past Medical History:   Diagnosis Date   • Hiatal hernia 7/28/2011 07/28/2011--3 cm hiatal hernia, otherwise normal EGD.   • History of coronary angiogram 2010 2010-- patient had a heart catheterization which was normal.   • History of nephrolithiasis Approximately 1985    Approximately 1985--patient passed a kidney stone stone spontaneously.   • History of peptic ulcer Approximately 1979    Approximately 1979--patient was told he had a peptic ulcer and was placed on Mylanta.  It sounds as if he had an EGD.   • Hyperlipidemia 6/22/2012 06/22/2012--treatment for hyperlipidemia begun.   • Hypothyroidism 4/27/2012 05/04/2012--treatment for hypothyroidism begun.  05/01/2012--ultrasound of the thyroid revealed increased hypervascularity, slightly heterogeneous echo.   04/27/2012--initial diagnosis of hypothyroidism.   • Microalbuminuria 5/15/2015    05/15/2015--urine microalbumin slightly elevated at 19.4. Normal range 0.0--17.0.   • Type 2 diabetes mellitus (CMS/[facility]) 4/1/2011    05/10/2013--initial diagnosis of mild diabetes. Hemoglobin A1c 6.6.  04/01/2011--mild impaired fasting glucose.      • Vitamin D deficiency 3/21/2016         Past Surgical History:   Procedure Laterality Date   • COLONOSCOPY  07/28/2011 07/28/2011--normal colonoscopy with an excellent prep.   • ESOPHAGOSCOPY / EGD  07/28/2011 07/28/2011--3 cm hiatal hernia, otherwise normal EGD.   • TONSILLECTOMY      11 years old.         No Known Allergies        Current Outpatient Medications:   •  Ascorbic Acid (VITAMIN C PO), Take  by mouth., Disp: , Rfl:   •  atorvastatin (LIPITOR) 20 MG tablet, TAKE 1 TABLET BY MOUTH DAILY FOR CHOLESTEROL, Disp: 30 tablet, Rfl: 0  •  Cholecalciferol (VITAMIN D3) 5000 UNITS capsule capsule, Take  by mouth., Disp: , Rfl:   •   ezetimibe (ZETIA) 10 MG tablet, TAKE 1 TABLET BY MOUTH DAILY FOR CHOLESTEROL, Disp: 30 tablet, Rfl: 3  •  levothyroxine (SYNTHROID, LEVOTHROID) 100 MCG tablet, TAKE 1 TABLET BY MOUTH DAILY FOR THYROID, Disp: 90 tablet, Rfl: 0  •  multivitamin (THERAGRAN) tablet tablet, Take 1 tablet by mouth daily., Disp: , Rfl:       Family History   Problem Relation Age of Onset   • Coronary artery disease Mother    • Diabetes Mother    • Hypertension Mother         Essential   • Hyperlipidemia Mother    • Diabetes Father    • Hypertension Father         Essential   • Hyperlipidemia Father    • Coronary artery disease Brother    • Diabetes Brother    • Hypertension Brother         Essential   • Hyperlipidemia Brother          Social History     Socioeconomic History   • Marital status:      Spouse name: Not on file   • Number of children: Not on file   • Years of education: Not on file   • Highest education level: Not on file   Occupational History   • Occupation: [employer]    Social Needs   • Financial resource strain: Not hard at all   • Food insecurity:     Worry: Never true     Inability: Never true   • Transportation needs:     Medical: No     Non-medical: No   Tobacco Use   • Smoking status: Former Smoker     Types: Cigars   • Smokeless tobacco: Never Used   Substance and Sexual Activity   • Alcohol use: Yes     Frequency: 4 or more times a week     Drinks per session: 3 or 4     Binge frequency: Never     Comment: Moderately   • Drug use: No   • Sexual activity: Yes     Partners: Female   Lifestyle   • Physical activity:     Days per week: 4 days     Minutes per session: 60 min   • Stress: Not at all   Relationships   • Social connections:     Talks on phone: More than three times a week     Gets together: Once a week     Attends Buddhist service: Never     Active member of club or organization: Yes     Attends meetings of clubs or organizations: Never     Relationship status:          Vitals:     "05/29/19 1503   BP: 112/72   Pulse: 64   SpO2: 98%   Weight: 90.8 kg (200 lb 3.2 oz)   Height: 177.8 cm (70\")          Physical Exam:    General: Alert and oriented x 3.  No acute distress.  Normal affect.  HEENT: Pupils equal, round, reactive to light; extraocular movements intact; sclerae nonicteric; pharynx, ear canals and TMs normal.  Neck: Without JVD, thyromegaly, bruit, or adenopathy.  Lungs: Clear to auscultation in all fields.  Heart: Regular rate and rhythm without murmur, rub, gallop, or click.  Abdomen: Soft, nontender, without hepatosplenomegaly or hernia.  Bowel sounds normal.  : Deferred.  Rectal: Deferred.  Extremities: Without clubbing, cyanosis, edema, or pulse deficit.  Neurologic: Intact without focal deficit.  Normal station and gait observed during ingress and egress from the examination room.  Skin: Without significant lesion.  Musculoskeletal: Unremarkable.    May 29, 2019--routine diabetic foot exam reveals no evidence of diabetic foot ulcer or pre-ulcerative callus.  Distal pulses are palpable and sensation subjectively intact.    Lab/other results:    NMR is essentially perfect.  CMP normal except blood sugar 130, potassium 5.9, calcium slightly elevated at 10.6, AST elevated at 42, ALT elevated at 55.  CBC is normal.  Albumin/creatinine ratio is normal.  Hemoglobin A1c 7.2.  Thyroid function tests are normal.  PSA normal at 0.941.  Vitamin D normal.  CPK elevated at 801.    Assessment/Plan:     Diagnosis Plan   1. Routine physical examination     2. Type 2 diabetes mellitus without complication, without long-term current use of insulin (CMS/[facility])     3. Diabetic foot exam     4. Vitamin D deficiency     5. Microalbuminuria     6. Hypothyroidism, unspecified type     7. Hyperlipidemia, unspecified hyperlipidemia type     8. Diabetic eye exam (CMS/[facility])     9. Therapeutic drug monitoring     10. Hypercalcemia     11. Hyperkalemia     12. Elevated CPK       Patient presents with essentially " normal annual except for the following issues: He has type 2 diabetes which is diet controlled and under good control.  Diabetic foot exam is normal.  Vitamin D is therapeutic.  Microalbuminuria is currently negative.  Thyroid is therapeutic.  Hyperlipidemia is under excellent control.  Patient is up-to-date on his diabetic eye exam but we are attempting to obtain that documentation.  Patient is noted to have an elevated calcium and elevated potassium level and I think this is likely laboratory error but needs to be reevaluated.  His liver enzymes are up slightly as well as his CPK.  We will repeat those labs before proceeding with further work-up.     Several preventative health issues discussed including review of vaccinations and recommendations, including dietary issues, exercise and weight loss.  Safe sex practices discussed.  Patient advised to wear seatbelt whenever driving and avoid texting and driving.  Also advised to look both ways before crossing the street.  Colon cancer prevention discussed and is up-to-date with colonoscopy.  Advised to avoid tobacco products and minimize alcohol consumption.    Plan is as follows: Recheck CMP and CPK.  Patient will follow-up on the phone for the results and possible further instructions.  No changes in current medical regimen.  Patient encouraged to follow low carbohydrate diet and lose weight.  Exercise.  We will schedule lab and follow-up in about 6 months.  I explained to patient that if his A1c was not better at that time then we may need to consider a pill for his diabetes.        Procedures

## 2019-06-18 DIAGNOSIS — E78.5 HYPERLIPIDEMIA, UNSPECIFIED HYPERLIPIDEMIA TYPE: Chronic | ICD-10-CM

## 2019-06-19 RX ORDER — ATORVASTATIN CALCIUM 20 MG/1
TABLET, FILM COATED ORAL
Qty: 30 TABLET | Refills: 5 | Status: SHIPPED | OUTPATIENT
Start: 2019-06-19 | End: 2019-12-30

## 2019-06-19 RX ORDER — EZETIMIBE 10 MG/1
TABLET ORAL
Qty: 30 TABLET | Refills: 5 | Status: SHIPPED | OUTPATIENT
Start: 2019-06-19 | End: 2019-12-30

## 2019-09-07 DIAGNOSIS — E03.9 HYPOTHYROIDISM, UNSPECIFIED TYPE: Chronic | ICD-10-CM

## 2019-09-07 RX ORDER — LEVOTHYROXINE SODIUM 0.1 MG/1
TABLET ORAL
Qty: 90 TABLET | Refills: 0 | Status: SHIPPED | OUTPATIENT
Start: 2019-09-07 | End: 2019-12-13 | Stop reason: SDUPTHER

## 2019-11-26 DIAGNOSIS — R74.8 ELEVATED CPK: ICD-10-CM

## 2019-11-26 DIAGNOSIS — E11.9 TYPE 2 DIABETES MELLITUS WITHOUT COMPLICATION, WITHOUT LONG-TERM CURRENT USE OF INSULIN (HCC): Chronic | ICD-10-CM

## 2019-11-26 DIAGNOSIS — E78.5 HYPERLIPIDEMIA, UNSPECIFIED HYPERLIPIDEMIA TYPE: Chronic | ICD-10-CM

## 2019-11-26 DIAGNOSIS — E83.52 HYPERCALCEMIA: ICD-10-CM

## 2019-11-26 DIAGNOSIS — E87.5 HYPERKALEMIA: ICD-10-CM

## 2019-11-28 LAB
ALBUMIN SERPL-MCNC: 4.5 G/DL (ref 3.5–5.2)
ALBUMIN/GLOB SERPL: 2.3 G/DL
ALP SERPL-CCNC: 63 U/L (ref 39–117)
ALT SERPL-CCNC: 45 U/L (ref 1–41)
AST SERPL-CCNC: 30 U/L (ref 1–40)
BILIRUB SERPL-MCNC: 0.4 MG/DL (ref 0.2–1.2)
BUN SERPL-MCNC: 15 MG/DL (ref 6–20)
BUN/CREAT SERPL: 18.3 (ref 7–25)
CALCIUM SERPL-MCNC: 9.7 MG/DL (ref 8.6–10.5)
CHLORIDE SERPL-SCNC: 101 MMOL/L (ref 98–107)
CHOLEST SERPL-MCNC: 167 MG/DL (ref 100–199)
CK SERPL-CCNC: 116 U/L (ref 20–200)
CO2 SERPL-SCNC: 27.7 MMOL/L (ref 22–29)
CREAT SERPL-MCNC: 0.82 MG/DL (ref 0.76–1.27)
GLOBULIN SER CALC-MCNC: 2 GM/DL
GLUCOSE SERPL-MCNC: 149 MG/DL (ref 65–99)
HBA1C MFR BLD: 7.5 % (ref 4.8–5.6)
HDL SERPL-SCNC: 40 UMOL/L
HDLC SERPL-MCNC: 50 MG/DL
LDL SERPL QN: 20.2 NM
LDL SERPL-SCNC: 1191 NMOL/L
LDL SMALL SERPL-SCNC: 684 NMOL/L
LDLC SERPL CALC-MCNC: 75 MG/DL (ref 0–99)
POTASSIUM SERPL-SCNC: 5.1 MMOL/L (ref 3.5–5.2)
PROT SERPL-MCNC: 6.5 G/DL (ref 6–8.5)
SODIUM SERPL-SCNC: 141 MMOL/L (ref 136–145)
TRIGL SERPL-MCNC: 212 MG/DL (ref 0–149)

## 2019-12-04 ENCOUNTER — OFFICE VISIT (OUTPATIENT)
Dept: INTERNAL MEDICINE | Facility: CLINIC | Age: 59
End: 2019-12-04

## 2019-12-04 VITALS
HEIGHT: 70 IN | OXYGEN SATURATION: 95 % | WEIGHT: 207 LBS | BODY MASS INDEX: 29.63 KG/M2 | SYSTOLIC BLOOD PRESSURE: 120 MMHG | DIASTOLIC BLOOD PRESSURE: 72 MMHG | HEART RATE: 68 BPM

## 2019-12-04 DIAGNOSIS — E83.52 HYPERCALCEMIA: ICD-10-CM

## 2019-12-04 DIAGNOSIS — Z00.00 ROUTINE PHYSICAL EXAMINATION: ICD-10-CM

## 2019-12-04 DIAGNOSIS — E78.5 HYPERLIPIDEMIA, UNSPECIFIED HYPERLIPIDEMIA TYPE: Chronic | ICD-10-CM

## 2019-12-04 DIAGNOSIS — E03.9 HYPOTHYROIDISM, UNSPECIFIED TYPE: Chronic | ICD-10-CM

## 2019-12-04 DIAGNOSIS — E11.9 TYPE 2 DIABETES MELLITUS WITHOUT COMPLICATION, WITHOUT LONG-TERM CURRENT USE OF INSULIN (HCC): Primary | ICD-10-CM

## 2019-12-04 DIAGNOSIS — R80.9 MICROALBUMINURIA: Chronic | ICD-10-CM

## 2019-12-04 DIAGNOSIS — E55.9 VITAMIN D DEFICIENCY: Chronic | ICD-10-CM

## 2019-12-04 DIAGNOSIS — E87.5 HYPERKALEMIA: ICD-10-CM

## 2019-12-04 DIAGNOSIS — R74.8 ELEVATED CPK: ICD-10-CM

## 2019-12-04 PROCEDURE — 99214 OFFICE O/P EST MOD 30 MIN: CPT | Performed by: INTERNAL MEDICINE

## 2019-12-04 NOTE — PROGRESS NOTES
12/04/2019    Patient Information  Prudencio Mckinley                                                                                          [address] [place] [address]      1960  [unfilled]  [phone] (work)    Chief Complaint:     Follow-up type 2 diabetes, hyperlipidemia, hypothyroidism, microalbuminuria, vitamin D deficiency, recent hypercalcemia, hyperkalemia, and elevated CPK.  No new acute complaints.    History of Present Illness:    Patient with history of medical problems as outlined in chief complaint presents today for follow-up with lab prior in order to monitor his chronic medical issues.  His past medical history reviewed and updated were necessary including health maintenance parameters.  This reveals he is up-to-date or else accounted for after today's visit.    Review of Systems   Constitution: Negative.   HENT: Negative.    Eyes: Negative.    Cardiovascular: Negative.    Respiratory: Negative.    Endocrine: Negative.    Hematologic/Lymphatic: Negative.    Skin: Negative.    Musculoskeletal: Negative.    Gastrointestinal: Negative.    Genitourinary: Negative.    Neurological: Negative.    Psychiatric/Behavioral: Negative.    Allergic/Immunologic: Negative.        Active Problems:    Patient Active Problem List   Diagnosis   • Hiatal hernia   • Hyperlipidemia   • Hypothyroidism   • Microalbuminuria   • Type 2 diabetes mellitus, without long-term current use of insulin (CMS/[facility])   • Vitamin D deficiency   • Therapeutic drug monitoring   • Routine physical examination   • Diabetic foot exam   • Diabetic eye exam (CMS/[facility])   • Hypercalcemia   • Hyperkalemia   • Elevated CPK         Past Medical History:   Diagnosis Date   • Hiatal hernia 7/28/2011 07/28/2011--3 cm hiatal hernia, otherwise normal EGD.   • History of coronary angiogram 2010 2010-- patient had a heart catheterization which was normal.   • History of nephrolithiasis Approximately 1985    Approximately  1985--patient passed a kidney stone stone spontaneously.   • History of peptic ulcer Approximately 1979    Approximately 1979--patient was told he had a peptic ulcer and was placed on Mylanta.  It sounds as if he had an EGD.   • Hyperlipidemia 6/22/2012 06/22/2012--treatment for hyperlipidemia begun.   • Hypothyroidism 4/27/2012 05/04/2012--treatment for hypothyroidism begun.  05/01/2012--ultrasound of the thyroid revealed increased hypervascularity, slightly heterogeneous echo.   04/27/2012--initial diagnosis of hypothyroidism.   • Microalbuminuria 5/15/2015    05/15/2015--urine microalbumin slightly elevated at 19.4. Normal range 0.0--17.0.   • Type 2 diabetes mellitus (CMS/[facility]) 4/1/2011    05/10/2013--initial diagnosis of mild diabetes. Hemoglobin A1c 6.6.  04/01/2011--mild impaired fasting glucose.      • Type 2 diabetes mellitus, without long-term current use of insulin (CMS/[facility]) 4/1/2011    05/10/2013--initial diagnosis of mild diabetes. Hemoglobin A1c 6.6.  04/01/2011--mild impaired fasting glucose.      • Vitamin D deficiency 3/21/2016         Past Surgical History:   Procedure Laterality Date   • COLONOSCOPY  07/28/2011 07/28/2011--normal colonoscopy with an excellent prep.   • ESOPHAGOSCOPY / EGD  07/28/2011 07/28/2011--3 cm hiatal hernia, otherwise normal EGD.   • TONSILLECTOMY      11 years old.         No Known Allergies        Current Outpatient Medications:   •  Ascorbic Acid (VITAMIN C PO), Take  by mouth., Disp: , Rfl:   •  atorvastatin (LIPITOR) 20 MG tablet, TAKE 1 TABLET BY MOUTH DAILY FOR CHOLESTEROL, Disp: 30 tablet, Rfl: 5  •  Cholecalciferol (VITAMIN D3) 5000 UNITS capsule capsule, Take  by mouth., Disp: , Rfl:   •  ezetimibe (ZETIA) 10 MG tablet, TAKE 1 TABLET BY MOUTH DAILY FOR CHOLESTEROL, Disp: 30 tablet, Rfl: 5  •  levothyroxine (SYNTHROID, LEVOTHROID) 100 MCG tablet, TAKE 1 TABLET BY MOUTH DAILY FOR THYROID, Disp: 90 tablet, Rfl: 0  •  multivitamin (THERAGRAN) tablet tablet,  "Take 1 tablet by mouth daily., Disp: , Rfl:   •  SITagliptin-metFORMIN HCl ER (JANUMET XR) 100-1000 MG tablet, Take 1 p.o. daily for diabetes.  Take with food., Disp: 30 tablet, Rfl: 11      Family History   Problem Relation Age of Onset   • Coronary artery disease Mother    • Diabetes Mother    • Hypertension Mother         Essential   • Hyperlipidemia Mother    • Diabetes Father    • Hypertension Father         Essential   • Hyperlipidemia Father    • Coronary artery disease Brother    • Diabetes Brother    • Hypertension Brother         Essential   • Hyperlipidemia Brother          Social History     Socioeconomic History   • Marital status:      Spouse name: Not on file   • Number of children: Not on file   • Years of education: Not on file   • Highest education level: Not on file   Occupational History   • Occupation: [employer]    Social Needs   • Financial resource strain: Not hard at all   • Food insecurity:     Worry: Never true     Inability: Never true   • Transportation needs:     Medical: No     Non-medical: No   Tobacco Use   • Smoking status: Former Smoker     Types: Cigars   • Smokeless tobacco: Never Used   Substance and Sexual Activity   • Alcohol use: Yes     Frequency: 4 or more times a week     Drinks per session: 3 or 4     Binge frequency: Never     Comment: Moderately   • Drug use: No   • Sexual activity: Yes     Partners: Female   Lifestyle   • Physical activity:     Days per week: 4 days     Minutes per session: 60 min   • Stress: Not at all   Relationships   • Social connections:     Talks on phone: More than three times a week     Gets together: Once a week     Attends [employer] service: Never     Active member of club or organization: Yes     Attends meetings of clubs or organizations: Never     Relationship status:          Vitals:    12/04/19 0922   BP: 120/72   BP Location: Left arm   Pulse: 68   SpO2: 95%   Weight: 93.9 kg (207 lb)   Height: 177.8 cm (70\")    "     Body mass index is 29.7 kg/m².      Physical Exam:    General: Alert and oriented x 3.  No acute distress.  Normal affect.  Overweight.  HEENT: Pupils equal, round, reactive to light; extraocular movements intact; sclerae nonicteric; pharynx, ear canals and TMs normal.  Neck: Without JVD, thyromegaly, bruit, or adenopathy.  Lungs: Clear to auscultation in all fields.  Heart: Regular rate and rhythm without murmur, rub, gallop, or click.  Abdomen: Soft, nontender, without hepatosplenomegaly or hernia.  Bowel sounds normal.  : Deferred.  Rectal: Deferred.  Extremities: Without clubbing, cyanosis, edema, or pulse deficit.  Neurologic: Intact without focal deficit.  Normal station and gait observed during ingress and egress from the examination room.  Skin: Without significant lesion.  Musculoskeletal: Unremarkable.    Lab/other results:    NMR reveals a total cholesterol 167.  Triglycerides mildly elevated at 212.  LDL particle number mildly elevated 1191.  Small LDL particle number elevated at 684.  HDL particle numbers fairly good at 40.0.  CMP normal except blood sugar 149, ALT 45.  Calcium and potassium levels are normal.  Hemoglobin A1c 7.5.  CPK is normal at 116.    Assessment/Plan:     Diagnosis Plan   1. Type 2 diabetes mellitus without complication, without long-term current use of insulin (CMS/[facility])  SITagliptin-metFORMIN HCl ER (JANUMET XR) 100-1000 MG tablet   2. Hyperlipidemia, unspecified hyperlipidemia type     3. Hypothyroidism, unspecified type     4. Microalbuminuria     5. Vitamin D deficiency     6. Hypercalcemia     7. Hyperkalemia     8. Elevated CPK       Patient has type 2 diabetes that really should be treated with medication.  I do not think patient will need more than 1 pill such as Janumet.  Hyperlipidemia is under reasonable control but I expect this to be under better control once we get his diabetes more controlled.  Thyroid is been therapeutic.  Microalbuminuria has been stable.   Vitamin D has been therapeutic.  Hypercalcemia and hyperkalemia as well as elevated CPK have resolved and I suspect this was related to laboratory error.    Plan is as follows: Start Janumet extended release 100/1000, 1 p.o. daily with food.  Patient does eat supper on a regular basis and I suggested that he take it at suppertime.  No other changes in medical regimen.  Patient will follow-up after May 29, 2020 with lab prior for his annual exam or follow-up as needed.  We will discuss Cologuard at the next visit.        Procedures

## 2019-12-13 DIAGNOSIS — E03.9 HYPOTHYROIDISM, UNSPECIFIED TYPE: Chronic | ICD-10-CM

## 2019-12-16 RX ORDER — LEVOTHYROXINE SODIUM 0.1 MG/1
TABLET ORAL
Qty: 90 TABLET | Refills: 0 | Status: SHIPPED | OUTPATIENT
Start: 2019-12-16 | End: 2020-03-16

## 2019-12-29 DIAGNOSIS — E78.5 HYPERLIPIDEMIA, UNSPECIFIED HYPERLIPIDEMIA TYPE: Chronic | ICD-10-CM

## 2019-12-30 RX ORDER — ATORVASTATIN CALCIUM 20 MG/1
TABLET, FILM COATED ORAL
Qty: 30 TABLET | Refills: 5 | Status: SHIPPED | OUTPATIENT
Start: 2019-12-30 | End: 2020-07-13

## 2019-12-30 RX ORDER — EZETIMIBE 10 MG/1
TABLET ORAL
Qty: 30 TABLET | Refills: 5 | Status: SHIPPED | OUTPATIENT
Start: 2019-12-30 | End: 2020-07-13

## 2020-03-13 DIAGNOSIS — E03.9 HYPOTHYROIDISM, UNSPECIFIED TYPE: Chronic | ICD-10-CM

## 2020-03-16 RX ORDER — LEVOTHYROXINE SODIUM 0.1 MG/1
TABLET ORAL
Qty: 90 TABLET | Refills: 0 | Status: SHIPPED | OUTPATIENT
Start: 2020-03-16 | End: 2020-06-26

## 2020-05-14 DIAGNOSIS — E03.9 HYPOTHYROIDISM, UNSPECIFIED TYPE: Chronic | ICD-10-CM

## 2020-05-14 DIAGNOSIS — R80.9 MICROALBUMINURIA: Chronic | ICD-10-CM

## 2020-05-14 DIAGNOSIS — E55.9 VITAMIN D DEFICIENCY: Chronic | ICD-10-CM

## 2020-05-14 DIAGNOSIS — E11.9 TYPE 2 DIABETES MELLITUS WITHOUT COMPLICATION, WITHOUT LONG-TERM CURRENT USE OF INSULIN (HCC): ICD-10-CM

## 2020-05-14 DIAGNOSIS — E78.5 HYPERLIPIDEMIA, UNSPECIFIED HYPERLIPIDEMIA TYPE: Chronic | ICD-10-CM

## 2020-05-14 DIAGNOSIS — Z00.00 ROUTINE PHYSICAL EXAMINATION: ICD-10-CM

## 2020-05-26 ENCOUNTER — APPOINTMENT (OUTPATIENT)
Dept: LAB | Facility: HOSPITAL | Age: 60
End: 2020-05-26

## 2020-05-26 LAB
25(OH)D3 SERPL-MCNC: 43.1 NG/ML (ref 30–100)
ALBUMIN SERPL-MCNC: 4.2 G/DL (ref 3.5–5.2)
ALBUMIN UR-MCNC: <1.2 MG/DL
ALBUMIN/GLOB SERPL: 1.9 G/DL
ALP SERPL-CCNC: 47 U/L (ref 39–117)
ALT SERPL W P-5'-P-CCNC: 29 U/L (ref 1–41)
ANION GAP SERPL CALCULATED.3IONS-SCNC: 6.3 MMOL/L (ref 5–15)
AST SERPL-CCNC: 25 U/L (ref 1–40)
BILIRUB SERPL-MCNC: 0.3 MG/DL (ref 0.2–1.2)
BILIRUB UR QL STRIP: NEGATIVE
BUN BLD-MCNC: 11 MG/DL (ref 6–20)
BUN/CREAT SERPL: 12.4 (ref 7–25)
CALCIUM SPEC-SCNC: 9.4 MG/DL (ref 8.6–10.5)
CHLORIDE SERPL-SCNC: 102 MMOL/L (ref 98–107)
CK SERPL-CCNC: 131 U/L (ref 20–200)
CLARITY UR: CLEAR
CO2 SERPL-SCNC: 27.7 MMOL/L (ref 22–29)
COLOR UR: ABNORMAL
CREAT BLD-MCNC: 0.89 MG/DL (ref 0.76–1.27)
CREAT UR-MCNC: 174.5 MG/DL
DEPRECATED RDW RBC AUTO: 41.3 FL (ref 37–54)
ERYTHROCYTE [DISTWIDTH] IN BLOOD BY AUTOMATED COUNT: 12.6 % (ref 12.3–15.4)
GFR SERPL CREATININE-BSD FRML MDRD: 87 ML/MIN/1.73
GLOBULIN UR ELPH-MCNC: 2.2 GM/DL
GLUCOSE BLD-MCNC: 119 MG/DL (ref 65–99)
GLUCOSE UR STRIP-MCNC: NEGATIVE MG/DL
HBA1C MFR BLD: 6.58 % (ref 4.8–5.6)
HCT VFR BLD AUTO: 47.3 % (ref 37.5–51)
HGB BLD-MCNC: 16.3 G/DL (ref 13–17.7)
HGB UR QL STRIP.AUTO: NEGATIVE
KETONES UR QL STRIP: NEGATIVE
LEUKOCYTE ESTERASE UR QL STRIP.AUTO: NEGATIVE
MCH RBC QN AUTO: 30.9 PG (ref 26.6–33)
MCHC RBC AUTO-ENTMCNC: 34.5 G/DL (ref 31.5–35.7)
MCV RBC AUTO: 89.6 FL (ref 79–97)
MICROALBUMIN/CREAT UR: NORMAL MG/G{CREAT}
NITRITE UR QL STRIP: NEGATIVE
PH UR STRIP.AUTO: 6 [PH] (ref 5–8)
PLATELET # BLD AUTO: 199 10*3/MM3 (ref 140–450)
PMV BLD AUTO: 12.1 FL (ref 6–12)
POTASSIUM BLD-SCNC: 4.7 MMOL/L (ref 3.5–5.2)
PROT SERPL-MCNC: 6.4 G/DL (ref 6–8.5)
PROT UR QL STRIP: NEGATIVE
PSA SERPL-MCNC: 1.04 NG/ML (ref 0–4)
RBC # BLD AUTO: 5.28 10*6/MM3 (ref 4.14–5.8)
SODIUM BLD-SCNC: 136 MMOL/L (ref 136–145)
SP GR UR STRIP: 1.03 (ref 1–1.03)
T3FREE SERPL-MCNC: 2.73 PG/ML (ref 2–4.4)
T4 FREE SERPL-MCNC: 1.24 NG/DL (ref 0.93–1.7)
TSH SERPL DL<=0.05 MIU/L-ACNC: 1.28 UIU/ML (ref 0.27–4.2)
UROBILINOGEN UR QL STRIP: ABNORMAL
WBC NRBC COR # BLD: 9.67 10*3/MM3 (ref 3.4–10.8)

## 2020-05-26 PROCEDURE — 85027 COMPLETE CBC AUTOMATED: CPT | Performed by: INTERNAL MEDICINE

## 2020-05-26 PROCEDURE — 82550 ASSAY OF CK (CPK): CPT | Performed by: INTERNAL MEDICINE

## 2020-05-26 PROCEDURE — 82570 ASSAY OF URINE CREATININE: CPT | Performed by: INTERNAL MEDICINE

## 2020-05-26 PROCEDURE — 83704 LIPOPROTEIN BLD QUAN PART: CPT | Performed by: INTERNAL MEDICINE

## 2020-05-26 PROCEDURE — 82043 UR ALBUMIN QUANTITATIVE: CPT | Performed by: INTERNAL MEDICINE

## 2020-05-26 PROCEDURE — 36415 COLL VENOUS BLD VENIPUNCTURE: CPT

## 2020-05-26 PROCEDURE — 80061 LIPID PANEL: CPT | Performed by: INTERNAL MEDICINE

## 2020-05-26 PROCEDURE — 84439 ASSAY OF FREE THYROXINE: CPT | Performed by: INTERNAL MEDICINE

## 2020-05-26 PROCEDURE — 84153 ASSAY OF PSA TOTAL: CPT | Performed by: INTERNAL MEDICINE

## 2020-05-26 PROCEDURE — 83036 HEMOGLOBIN GLYCOSYLATED A1C: CPT | Performed by: INTERNAL MEDICINE

## 2020-05-26 PROCEDURE — 82306 VITAMIN D 25 HYDROXY: CPT | Performed by: INTERNAL MEDICINE

## 2020-05-26 PROCEDURE — 80053 COMPREHEN METABOLIC PANEL: CPT | Performed by: INTERNAL MEDICINE

## 2020-05-26 PROCEDURE — 84443 ASSAY THYROID STIM HORMONE: CPT | Performed by: INTERNAL MEDICINE

## 2020-05-26 PROCEDURE — 81003 URINALYSIS AUTO W/O SCOPE: CPT | Performed by: INTERNAL MEDICINE

## 2020-05-26 PROCEDURE — 84481 FREE ASSAY (FT-3): CPT | Performed by: INTERNAL MEDICINE

## 2020-05-28 LAB
CHOLEST SERPL-MCNC: 116 MG/DL (ref 100–199)
HDL SERPL-SCNC: 32.7 UMOL/L
HDLC SERPL-MCNC: 45 MG/DL
LDL-P: 772 NMOL/L
LDLC REAL SIZE PAT SERPL: 20 NM
LDLC SERPL CALC-MCNC: 54 MG/DL (ref 0–99)
SMALL LDL-P: 586 NMOL/L
TRIGL SERPL-MCNC: 85 MG/DL (ref 0–149)

## 2020-06-02 ENCOUNTER — OFFICE VISIT (OUTPATIENT)
Dept: INTERNAL MEDICINE | Facility: CLINIC | Age: 60
End: 2020-06-02

## 2020-06-02 ENCOUNTER — RESULTS ENCOUNTER (OUTPATIENT)
Dept: INTERNAL MEDICINE | Facility: CLINIC | Age: 60
End: 2020-06-02

## 2020-06-02 VITALS
TEMPERATURE: 98.4 F | SYSTOLIC BLOOD PRESSURE: 118 MMHG | OXYGEN SATURATION: 98 % | BODY MASS INDEX: 27.63 KG/M2 | WEIGHT: 193 LBS | DIASTOLIC BLOOD PRESSURE: 68 MMHG | HEART RATE: 68 BPM | HEIGHT: 70 IN

## 2020-06-02 DIAGNOSIS — Z12.11 COLON CANCER SCREENING: ICD-10-CM

## 2020-06-02 DIAGNOSIS — E78.2 MIXED HYPERLIPIDEMIA: Chronic | ICD-10-CM

## 2020-06-02 DIAGNOSIS — E03.9 PRIMARY HYPOTHYROIDISM: Chronic | ICD-10-CM

## 2020-06-02 DIAGNOSIS — E11.29 TYPE 2 DIABETES MELLITUS WITH MICROALBUMINURIA, WITHOUT LONG-TERM CURRENT USE OF INSULIN (HCC): Chronic | ICD-10-CM

## 2020-06-02 DIAGNOSIS — R80.9 MICROALBUMINURIA: Chronic | ICD-10-CM

## 2020-06-02 DIAGNOSIS — Z00.00 ROUTINE PHYSICAL EXAMINATION: Primary | ICD-10-CM

## 2020-06-02 DIAGNOSIS — E11.9 ENCOUNTER FOR DIABETIC FOOT EXAM (HCC): Chronic | ICD-10-CM

## 2020-06-02 DIAGNOSIS — R80.9 TYPE 2 DIABETES MELLITUS WITH MICROALBUMINURIA, WITHOUT LONG-TERM CURRENT USE OF INSULIN (HCC): Chronic | ICD-10-CM

## 2020-06-02 DIAGNOSIS — E55.9 VITAMIN D DEFICIENCY: Chronic | ICD-10-CM

## 2020-06-02 PROCEDURE — 99396 PREV VISIT EST AGE 40-64: CPT | Performed by: INTERNAL MEDICINE

## 2020-06-02 NOTE — PROGRESS NOTES
06/02/2020    Patient Information  Prudencio Mckinley                                                                                          [address]  [place] [address]      1960  [unfilled]  [phone] (work)    Chief Complaint:     Routine physical examination and follow-up blood work in order to monitor chronic medical issues.  No new acute complaints.    History of Present Illness:    Patient with a history of type 2 diabetes, microalbuminuria, hyperlipidemia, hypothyroidism.  He presents today for his routine annual physical exam and follow-up lab work in order to monitor his chronic medical issues.  His past medical history reviewed and updated were necessary including health maintenance parameters.  This reveals he will be up-to-date or else accounted for after today's visit with the exception of a diabetic eye exam which I have encouraged him to get.    Review of Systems   Constitution: Negative.   HENT: Negative.    Eyes: Negative.    Cardiovascular: Negative.    Respiratory: Negative.    Endocrine: Negative.    Hematologic/Lymphatic: Negative.    Skin: Negative.    Musculoskeletal: Negative.    Gastrointestinal: Negative.    Genitourinary: Negative.    Neurological: Negative.    Psychiatric/Behavioral: Negative.    Allergic/Immunologic: Negative.        Active Problems:    Patient Active Problem List   Diagnosis   • Hiatal hernia   • Hyperlipidemia   • Primary hypothyroidism   • Microalbuminuria   • Type 2 diabetes mellitus with microalbuminuria, without long-term current use of insulin (CMS/[facility])   • Vitamin D deficiency   • Therapeutic drug monitoring   • Routine physical examination   • Diabetic foot exam   • Diabetic eye exam (CMS/[facility])         Past Medical History:   Diagnosis Date   • Hiatal hernia 7/28/2011 07/28/2011--3 cm hiatal hernia, otherwise normal EGD.   • History of coronary angiogram 2010 2010-- patient had a heart catheterization which was normal.   •  History of nephrolithiasis Approximately 1985    Approximately 1985--patient passed a kidney stone stone spontaneously.   • History of peptic ulcer Approximately 1979    Approximately 1979--patient was told he had a peptic ulcer and was placed on Mylanta.  It sounds as if he had an EGD.   • Hyperlipidemia 6/22/2012 06/22/2012--treatment for hyperlipidemia begun.   • Hypothyroidism 4/27/2012 05/04/2012--treatment for hypothyroidism begun.  05/01/2012--ultrasound of the thyroid revealed increased hypervascularity, slightly heterogeneous echo.   04/27/2012--initial diagnosis of hypothyroidism.   • Microalbuminuria 5/15/2015    05/15/2015--urine microalbumin slightly elevated at 19.4. Normal range 0.0--17.0.   • Vitamin D deficiency 3/21/2016         Past Surgical History:   Procedure Laterality Date   • COLONOSCOPY  07/28/2011 07/28/2011--normal colonoscopy with an excellent prep.   • ESOPHAGOSCOPY / EGD  07/28/2011 07/28/2011--3 cm hiatal hernia, otherwise normal EGD.   • TONSILLECTOMY      11 years old.         No Known Allergies        Current Outpatient Medications:   •  Ascorbic Acid (VITAMIN C PO), Take  by mouth., Disp: , Rfl:   •  atorvastatin (LIPITOR) 20 MG tablet, TAKE 1 TABLET BY MOUTH DAILY FOR CHOLESTEROL, Disp: 30 tablet, Rfl: 5  •  Cholecalciferol (VITAMIN D3) 5000 UNITS capsule capsule, Take  by mouth., Disp: , Rfl:   •  ezetimibe (ZETIA) 10 MG tablet, TAKE 1 TABLET BY MOUTH DAILY FOR CHOLESTEROL, Disp: 30 tablet, Rfl: 5  •  levothyroxine (SYNTHROID, LEVOTHROID) 100 MCG tablet, TAKE 1 TABLET BY MOUTH DAILY FOR THYROID, Disp: 90 tablet, Rfl: 0  •  multivitamin (THERAGRAN) tablet tablet, Take 1 tablet by mouth daily., Disp: , Rfl:   •  SITagliptin-metFORMIN HCl ER (JANUMET XR) 100-1000 MG tablet, Take 1 p.o. daily for diabetes.  Take with food., Disp: 30 tablet, Rfl: 11      Family History   Problem Relation Age of Onset   • Coronary artery disease Mother    • Diabetes Mother    •  "Hypertension Mother         Essential   • Hyperlipidemia Mother    • Diabetes Father    • Hypertension Father         Essential   • Hyperlipidemia Father    • Coronary artery disease Brother    • Diabetes Brother    • Hypertension Brother         Essential   • Hyperlipidemia Brother          Social History     Socioeconomic History   • Marital status:      Spouse name: Not on file   • Number of children: Not on file   • Years of education: Not on file   • Highest education level: Not on file   Occupational History   • Occupation: [employer]    Social Needs   • Financial resource strain: Not hard at all   • Food insecurity:     Worry: Never true     Inability: Never true   • Transportation needs:     Medical: No     Non-medical: No   Tobacco Use   • Smoking status: Former Smoker     Types: Cigars   • Smokeless tobacco: Never Used   Substance and Sexual Activity   • Alcohol use: Yes     Frequency: 4 or more times a week     Drinks per session: 3 or 4     Binge frequency: Never     Comment: Moderately   • Drug use: No   • Sexual activity: Yes     Partners: Female   Lifestyle   • Physical activity:     Days per week: 4 days     Minutes per session: 60 min   • Stress: Not at all   Relationships   • Social connections:     Talks on phone: More than three times a week     Gets together: Once a week     Attends [employer] service: Never     Active member of club or organization: Yes     Attends meetings of clubs or organizations: Never     Relationship status:          Vitals:    06/02/20 1516   BP: 118/68   BP Location: Left arm   Pulse: 68   Temp: 98.4 °F (36.9 °C)   TempSrc: Temporal   SpO2: 98%   Weight: 87.5 kg (193 lb)   Height: 177.8 cm (70\")        Body mass index is 27.69 kg/m².      Physical Exam:    General: Alert and oriented x 3.  No acute distress.  Normal affect.  HEENT: Pupils equal, round, reactive to light; extraocular movements intact; sclerae nonicteric; pharynx, ear canals and TMs " normal.  Neck: Without JVD, thyromegaly, bruit, or adenopathy.  Lungs: Clear to auscultation in all fields.  Heart: Regular rate and rhythm without murmur, rub, gallop, or click.  Abdomen: Soft, nontender, without hepatosplenomegaly or hernia.  Bowel sounds normal.  : Deferred.  Rectal: Deferred.  Extremities: Without clubbing, cyanosis, edema, or pulse deficit.  Neurologic: Intact without focal deficit.  Normal station and gait observed during ingress and egress from the examination room.  Skin: Without significant lesion.  Musculoskeletal: Unremarkable.    June 2, 2020--routine diabetic foot exam reveals no evidence of diabetic foot ulcer or pre-ulcerative callus.  Distal pulses are palpable and sensation intact.    Lab/other results:    NMR is perfect other than small LDL particle number slightly elevated at 586.  Microalbumin/creatinine ratio unable to calculate.  Hemoglobin A1c 6.5.  CPK normal.  CBC essentially normal.  Vitamin D normal.  Urinalysis normal.  Thyroid function test normal.  PSA normal at 1.04.    Assessment/Plan:     Diagnosis Plan   1. Routine physical examination     2. Type 2 diabetes mellitus with microalbuminuria, without long-term current use of insulin (CMS/[facility])  Hemoglobin A1c   3. Hyperlipidemia  CK    Comprehensive Metabolic Panel    NMR LipoProfile   4. Primary hypothyroidism  TSH    T4, Free    T3, Free   5. Microalbuminuria     6. Vitamin D deficiency     7. Diabetic foot exam     8. Colon cancer screening  Cologuard - Stool, Per Rectum     Patient presents with essentially normal annual physical except for the following issues: He has type 2 diabetes is under excellent control.  Hyperlipidemia is under excellent control and so was his thyroid.  Microalbuminuria seems to be stable.  Overall, patient is doing well.    Several preventative health issues discussed including review of vaccinations and recommendations, including dietary issues, exercise and weight loss.  Safe sex  practices discussed.  Patient advised to wear seatbelt whenever driving and avoid texting and driving.  Also advised to look both ways before crossing the street.  Colon cancer prevention discussed and is up-to-date with colonoscopy.  We had a discussion regarding Cologuard and patient would like to do that.  Advised to avoid tobacco products and minimize alcohol consumption.    Plan is as follows: Cologuard ordered strongly recommend diabetic eye exam.  No change in current medical regimen.  Recommend exercise, weight loss, and low carbohydrate diet.  Follow-up in 6 months with lab prior or follow-up as needed.        Procedures

## 2020-06-26 DIAGNOSIS — E03.9 HYPOTHYROIDISM, UNSPECIFIED TYPE: Chronic | ICD-10-CM

## 2020-06-26 RX ORDER — LEVOTHYROXINE SODIUM 0.1 MG/1
TABLET ORAL
Qty: 90 TABLET | Refills: 1 | Status: SHIPPED | OUTPATIENT
Start: 2020-06-26 | End: 2020-12-28

## 2020-07-12 DIAGNOSIS — E78.5 HYPERLIPIDEMIA, UNSPECIFIED HYPERLIPIDEMIA TYPE: Chronic | ICD-10-CM

## 2020-07-13 RX ORDER — ATORVASTATIN CALCIUM 20 MG/1
TABLET, FILM COATED ORAL
Qty: 30 TABLET | Refills: 5 | Status: SHIPPED | OUTPATIENT
Start: 2020-07-13 | End: 2021-01-04

## 2020-07-13 RX ORDER — EZETIMIBE 10 MG/1
TABLET ORAL
Qty: 30 TABLET | Refills: 5 | Status: SHIPPED | OUTPATIENT
Start: 2020-07-13 | End: 2021-01-04

## 2020-11-04 DIAGNOSIS — E03.9 PRIMARY HYPOTHYROIDISM: Chronic | ICD-10-CM

## 2020-11-04 DIAGNOSIS — E78.2 MIXED HYPERLIPIDEMIA: Chronic | ICD-10-CM

## 2020-11-04 DIAGNOSIS — E11.29 TYPE 2 DIABETES MELLITUS WITH MICROALBUMINURIA, WITHOUT LONG-TERM CURRENT USE OF INSULIN (HCC): Chronic | ICD-10-CM

## 2020-11-04 DIAGNOSIS — R80.9 TYPE 2 DIABETES MELLITUS WITH MICROALBUMINURIA, WITHOUT LONG-TERM CURRENT USE OF INSULIN (HCC): Chronic | ICD-10-CM

## 2020-12-02 ENCOUNTER — LAB (OUTPATIENT)
Dept: LAB | Facility: HOSPITAL | Age: 60
End: 2020-12-02

## 2020-12-02 LAB
ALBUMIN SERPL-MCNC: 4.2 G/DL (ref 3.5–5.2)
ALBUMIN/GLOB SERPL: 1.7 G/DL
ALP SERPL-CCNC: 49 U/L (ref 39–117)
ALT SERPL W P-5'-P-CCNC: 37 U/L (ref 1–41)
ANION GAP SERPL CALCULATED.3IONS-SCNC: 8.3 MMOL/L (ref 5–15)
AST SERPL-CCNC: 30 U/L (ref 1–40)
BILIRUB SERPL-MCNC: 0.3 MG/DL (ref 0–1.2)
BUN SERPL-MCNC: 10 MG/DL (ref 6–20)
BUN/CREAT SERPL: 13 (ref 7–25)
CALCIUM SPEC-SCNC: 9.4 MG/DL (ref 8.6–10.5)
CHLORIDE SERPL-SCNC: 103 MMOL/L (ref 98–107)
CK SERPL-CCNC: 113 U/L (ref 20–200)
CO2 SERPL-SCNC: 25.7 MMOL/L (ref 22–29)
CREAT SERPL-MCNC: 0.77 MG/DL (ref 0.76–1.27)
GFR SERPL CREATININE-BSD FRML MDRD: 103 ML/MIN/1.73
GLOBULIN UR ELPH-MCNC: 2.5 GM/DL
GLUCOSE SERPL-MCNC: 138 MG/DL (ref 65–99)
HBA1C MFR BLD: 6.62 % (ref 4.8–5.6)
POTASSIUM SERPL-SCNC: 5 MMOL/L (ref 3.5–5.2)
PROT SERPL-MCNC: 6.7 G/DL (ref 6–8.5)
SODIUM SERPL-SCNC: 137 MMOL/L (ref 136–145)
T3FREE SERPL-MCNC: 3.09 PG/ML (ref 2–4.4)
T4 FREE SERPL-MCNC: 1.34 NG/DL (ref 0.93–1.7)
TSH SERPL DL<=0.05 MIU/L-ACNC: 1.48 UIU/ML (ref 0.27–4.2)

## 2020-12-02 PROCEDURE — 83704 LIPOPROTEIN BLD QUAN PART: CPT | Performed by: INTERNAL MEDICINE

## 2020-12-02 PROCEDURE — 82550 ASSAY OF CK (CPK): CPT | Performed by: INTERNAL MEDICINE

## 2020-12-02 PROCEDURE — 84481 FREE ASSAY (FT-3): CPT | Performed by: INTERNAL MEDICINE

## 2020-12-02 PROCEDURE — 83036 HEMOGLOBIN GLYCOSYLATED A1C: CPT | Performed by: INTERNAL MEDICINE

## 2020-12-02 PROCEDURE — 84443 ASSAY THYROID STIM HORMONE: CPT | Performed by: INTERNAL MEDICINE

## 2020-12-02 PROCEDURE — 84439 ASSAY OF FREE THYROXINE: CPT | Performed by: INTERNAL MEDICINE

## 2020-12-02 PROCEDURE — 36415 COLL VENOUS BLD VENIPUNCTURE: CPT

## 2020-12-02 PROCEDURE — 80053 COMPREHEN METABOLIC PANEL: CPT | Performed by: INTERNAL MEDICINE

## 2020-12-02 PROCEDURE — 80061 LIPID PANEL: CPT | Performed by: INTERNAL MEDICINE

## 2020-12-04 LAB
CHOLEST SERPL-MCNC: 142 MG/DL (ref 100–199)
HDL SERPL-SCNC: 35 UMOL/L
HDLC SERPL-MCNC: 48 MG/DL
LDL SERPL QN: 20.6 NM
LDL SERPL-SCNC: 1035 NMOL/L
LDL SMALL SERPL-SCNC: 605 NMOL/L
LDLC SERPL CALC-MCNC: 71 MG/DL (ref 0–99)
TRIGL SERPL-MCNC: 129 MG/DL (ref 0–149)

## 2020-12-25 DIAGNOSIS — E03.9 HYPOTHYROIDISM, UNSPECIFIED TYPE: Chronic | ICD-10-CM

## 2020-12-28 RX ORDER — LEVOTHYROXINE SODIUM 0.1 MG/1
TABLET ORAL
Qty: 90 TABLET | Refills: 3 | Status: SHIPPED | OUTPATIENT
Start: 2020-12-28 | End: 2021-01-20 | Stop reason: SDUPTHER

## 2021-01-02 DIAGNOSIS — E11.9 TYPE 2 DIABETES MELLITUS WITHOUT COMPLICATION, WITHOUT LONG-TERM CURRENT USE OF INSULIN (HCC): ICD-10-CM

## 2021-01-02 DIAGNOSIS — E78.5 HYPERLIPIDEMIA, UNSPECIFIED HYPERLIPIDEMIA TYPE: Chronic | ICD-10-CM

## 2021-01-04 RX ORDER — SITAGLIPTIN AND METFORMIN HYDROCHLORIDE 1000; 100 MG/1; MG/1
TABLET, FILM COATED, EXTENDED RELEASE ORAL
Qty: 30 TABLET | Refills: 11 | Status: SHIPPED | OUTPATIENT
Start: 2021-01-04 | End: 2021-01-20 | Stop reason: SDUPTHER

## 2021-01-04 RX ORDER — EZETIMIBE 10 MG/1
TABLET ORAL
Qty: 30 TABLET | Refills: 5 | Status: SHIPPED | OUTPATIENT
Start: 2021-01-04 | End: 2021-01-20 | Stop reason: SDUPTHER

## 2021-01-04 RX ORDER — ATORVASTATIN CALCIUM 20 MG/1
TABLET, FILM COATED ORAL
Qty: 30 TABLET | Refills: 5 | Status: SHIPPED | OUTPATIENT
Start: 2021-01-04 | End: 2021-01-20 | Stop reason: SDUPTHER

## 2021-01-20 ENCOUNTER — OFFICE VISIT (OUTPATIENT)
Dept: INTERNAL MEDICINE | Facility: CLINIC | Age: 61
End: 2021-01-20

## 2021-01-20 VITALS
OXYGEN SATURATION: 98 % | TEMPERATURE: 97.5 F | RESPIRATION RATE: 18 BRPM | DIASTOLIC BLOOD PRESSURE: 58 MMHG | SYSTOLIC BLOOD PRESSURE: 96 MMHG | BODY MASS INDEX: 28.49 KG/M2 | HEART RATE: 68 BPM | WEIGHT: 199 LBS | HEIGHT: 70 IN

## 2021-01-20 DIAGNOSIS — E55.9 VITAMIN D DEFICIENCY: Chronic | ICD-10-CM

## 2021-01-20 DIAGNOSIS — Z00.00 ROUTINE PHYSICAL EXAMINATION: ICD-10-CM

## 2021-01-20 DIAGNOSIS — Z51.81 THERAPEUTIC DRUG MONITORING: ICD-10-CM

## 2021-01-20 DIAGNOSIS — R80.9 MICROALBUMINURIA: Chronic | ICD-10-CM

## 2021-01-20 DIAGNOSIS — R80.9 TYPE 2 DIABETES MELLITUS WITH MICROALBUMINURIA, WITHOUT LONG-TERM CURRENT USE OF INSULIN (HCC): Primary | Chronic | ICD-10-CM

## 2021-01-20 DIAGNOSIS — E78.2 MIXED HYPERLIPIDEMIA: Chronic | ICD-10-CM

## 2021-01-20 DIAGNOSIS — M25.562 CHRONIC PAIN OF LEFT KNEE: ICD-10-CM

## 2021-01-20 DIAGNOSIS — E03.9 PRIMARY HYPOTHYROIDISM: Chronic | ICD-10-CM

## 2021-01-20 DIAGNOSIS — G89.29 CHRONIC PAIN OF LEFT KNEE: ICD-10-CM

## 2021-01-20 DIAGNOSIS — E11.29 TYPE 2 DIABETES MELLITUS WITH MICROALBUMINURIA, WITHOUT LONG-TERM CURRENT USE OF INSULIN (HCC): Primary | Chronic | ICD-10-CM

## 2021-01-20 PROCEDURE — 99214 OFFICE O/P EST MOD 30 MIN: CPT | Performed by: INTERNAL MEDICINE

## 2021-01-20 RX ORDER — ATORVASTATIN CALCIUM 20 MG/1
TABLET, FILM COATED ORAL
Qty: 30 TABLET | Refills: 11 | Status: SHIPPED | OUTPATIENT
Start: 2021-01-20 | End: 2022-01-24

## 2021-01-20 RX ORDER — EZETIMIBE 10 MG/1
TABLET ORAL
Qty: 30 TABLET | Refills: 11 | Status: SHIPPED | OUTPATIENT
Start: 2021-01-20 | End: 2022-01-24

## 2021-01-20 RX ORDER — SITAGLIPTIN AND METFORMIN HYDROCHLORIDE 1000; 100 MG/1; MG/1
TABLET, FILM COATED, EXTENDED RELEASE ORAL
Qty: 30 TABLET | Refills: 11 | Status: SHIPPED | OUTPATIENT
Start: 2021-01-20 | End: 2022-01-24

## 2021-01-20 RX ORDER — LEVOTHYROXINE SODIUM 0.1 MG/1
TABLET ORAL
Qty: 90 TABLET | Refills: 3 | Status: SHIPPED | OUTPATIENT
Start: 2021-01-20 | End: 2021-12-29

## 2021-01-20 NOTE — PROGRESS NOTES
01/20/2021    Patient Information  Prudencio Mckinley                                                                                          [address]  [place] [address]      1960  [unfilled]  [phone] (work)    Chief Complaint:     Follow-up lab work in order to monitor chronic medical issues listed in history of present illness.  No new acute complaints.    History of Present Illness:    Patient with a history of type 2 diabetes, microalbuminuria, hyperlipidemia, hypothyroidism, vitamin D deficiency.  He presents today for follow-up with lab prior in order to monitor his chronic medical issues.  His past medical history reviewed and updated were necessary including health maintenance parameters.  This reveals he is up-to-date except for diabetic eye exam which I encouraged him to get.    Review of Systems   Constitution: Negative.   HENT: Negative.    Eyes: Negative.    Cardiovascular: Negative.    Respiratory: Negative.    Endocrine: Negative.    Hematologic/Lymphatic: Negative.    Skin: Negative.    Musculoskeletal: Negative.    Gastrointestinal: Negative.    Genitourinary: Negative.    Neurological: Negative.    Psychiatric/Behavioral: Negative.    Allergic/Immunologic: Negative.        Active Problems:    Patient Active Problem List   Diagnosis   • Hiatal hernia   • Hyperlipidemia   • Primary hypothyroidism   • Microalbuminuria   • Type 2 diabetes mellitus with microalbuminuria, without long-term current use of insulin (CMS/[facility])   • Vitamin D deficiency   • Therapeutic drug monitoring   • Routine physical examination   • Diabetic foot exam   • Diabetic eye exam (CMS/[facility])   • Chronic pain of left knee         Past Medical History:   Diagnosis Date   • Hiatal hernia 7/28/2011 07/28/2011--3 cm hiatal hernia, otherwise normal EGD.   • History of coronary angiogram 2010 2010-- patient had a heart catheterization which was normal.   • History of nephrolithiasis Approximately 1985     Approximately 1985--patient passed a kidney stone stone spontaneously.   • History of peptic ulcer Approximately 1979    Approximately 1979--patient was told he had a peptic ulcer and was placed on Mylanta.  It sounds as if he had an EGD.   • Hyperlipidemia 6/22/2012 06/22/2012--treatment for hyperlipidemia begun.   • Hypothyroidism 4/27/2012 05/04/2012--treatment for hypothyroidism begun.  05/01/2012--ultrasound of the thyroid revealed increased hypervascularity, slightly heterogeneous echo.   04/27/2012--initial diagnosis of hypothyroidism.   • Microalbuminuria 5/15/2015    05/15/2015--urine microalbumin slightly elevated at 19.4. Normal range 0.0--17.0.   • Vitamin D deficiency 3/21/2016         Past Surgical History:   Procedure Laterality Date   • COLONOSCOPY  07/28/2011 07/28/2011--normal colonoscopy with an excellent prep.   • ESOPHAGOSCOPY / EGD  07/28/2011 07/28/2011--3 cm hiatal hernia, otherwise normal EGD.   • TONSILLECTOMY      11 years old.         No Known Allergies        Current Outpatient Medications:   •  Ascorbic Acid (VITAMIN C PO), Take  by mouth., Disp: , Rfl:   •  atorvastatin (LIPITOR) 20 MG tablet, Take 1 p.o. daily for high cholesterol, Disp: 30 tablet, Rfl: 11  •  Cholecalciferol (VITAMIN D3) 5000 UNITS capsule capsule, Take  by mouth., Disp: , Rfl:   •  ezetimibe (ZETIA) 10 MG tablet, Take 1 p.o. daily for high cholesterol, Disp: 30 tablet, Rfl: 11  •  levothyroxine (SYNTHROID, LEVOTHROID) 100 MCG tablet, Take 1 p.o. daily for low thyroid, Disp: 90 tablet, Rfl: 3  •  multivitamin (THERAGRAN) tablet tablet, Take 1 tablet by mouth daily., Disp: , Rfl:   •  SITagliptin-metFORMIN HCl ER (Janumet XR) 100-1000 MG tablet, Take 1 p.o. daily with food for diabetes, Disp: 30 tablet, Rfl: 11      Family History   Problem Relation Age of Onset   • Coronary artery disease Mother    • Diabetes Mother    • Hypertension Mother         Essential   • Hyperlipidemia Mother    • Diabetes  "Father    • Hypertension Father         Essential   • Hyperlipidemia Father    • Coronary artery disease Brother    • Diabetes Brother    • Hypertension Brother         Essential   • Hyperlipidemia Brother          Social History     Socioeconomic History   • Marital status:      Spouse name: Not on file   • Number of children: Not on file   • Years of education: Not on file   • Highest education level: Not on file   Occupational History   • Occupation: [employer]    Social Needs   • Financial resource strain: Not hard at all   • Food insecurity     Worry: Never true     Inability: Never true   • Transportation needs     Medical: No     Non-medical: No   Tobacco Use   • Smoking status: Former Smoker     Types: Cigars   • Smokeless tobacco: Never Used   Substance and Sexual Activity   • Alcohol use: Yes     Frequency: 4 or more times a week     Drinks per session: 3 or 4     Binge frequency: Never     Comment: Moderately   • Drug use: No   • Sexual activity: Yes     Partners: Female   Lifestyle   • Physical activity     Days per week: 4 days     Minutes per session: 60 min   • Stress: Not at all   Relationships   • Social connections     Talks on phone: More than three times a week     Gets together: Once a week     Attends Taoism service: Never     Active member of club or organization: Yes     Attends meetings of clubs or organizations: Never     Relationship status:          Vitals:    01/20/21 1546   BP: 96/58   Pulse: 68   Resp: 18   Temp: 97.5 °F (36.4 °C)   TempSrc: Infrared   SpO2: 98%   Weight: 90.3 kg (199 lb)   Height: 177.8 cm (70\")        Body mass index is 28.55 kg/m².      Physical Exam:    General: Alert and oriented x 3.  No acute distress.  Normal affect.  Overweight/obese.  HEENT: Pupils equal, round, reactive to light; extraocular movements intact; sclerae nonicteric; pharynx, ear canals and TMs normal.  Neck: Without JVD, thyromegaly, bruit, or adenopathy.  Lungs: Clear " to auscultation in all fields.  Heart: Regular rate and rhythm without murmur, rub, gallop, or click.  Abdomen: Soft, nontender, without hepatosplenomegaly or hernia.  Bowel sounds normal.  : Deferred.  Rectal: Deferred.  Extremities: Without clubbing, cyanosis, edema, or pulse deficit.  Neurologic: Intact without focal deficit.  Normal station and gait observed during ingress and egress from the examination room.  Skin: Without significant lesion.  Musculoskeletal: Unremarkable.    Lab/other results:    NMR reveals a total cholesterol 142.  Triglycerides 129.  LDL particle number slightly elevated at 1035.  Small LDL particle number mildly elevated 605.  HDL particle number normal at 35.  CMP normal except glucose 138.  Hemoglobin A1c excellent at 6.62.  Thyroid function tests are normal.  CPK normal.    Assessment/Plan:     Diagnosis Plan   1. Type 2 diabetes mellitus with microalbuminuria, without long-term current use of insulin (CMS/[facility])  SITagliptin-metFORMIN HCl ER (Janumet XR) 100-1000 MG tablet    Comprehensive Metabolic Panel    Hemoglobin A1c    Microalbumin / Creatinine Urine Ratio - Urine, Clean Catch    Urinalysis With Microscopic If Indicated (No Culture) - Urine, Clean Catch   2. Microalbuminuria  Microalbumin / Creatinine Urine Ratio - Urine, Clean Catch   3. Hyperlipidemia  ezetimibe (ZETIA) 10 MG tablet    atorvastatin (LIPITOR) 20 MG tablet    CK    Comprehensive Metabolic Panel    NMR LipoProfile   4. Primary hypothyroidism  levothyroxine (SYNTHROID, LEVOTHROID) 100 MCG tablet    TSH    T4, Free    T3, Free   5. Vitamin D deficiency  Vitamin D 25 Hydroxy   6. Therapeutic drug monitoring  CBC (No Diff)    PSA DIAGNOSTIC   7. Routine physical examination  CBC (No Diff)    CK    Comprehensive Metabolic Panel    Hemoglobin A1c    Microalbumin / Creatinine Urine Ratio - Urine, Clean Catch    NMR LipoProfile    Urinalysis With Microscopic If Indicated (No Culture) - Urine, Clean Catch    TSH    T4,  Free    T3, Free    PSA DIAGNOSTIC    Vitamin D 25 Hydroxy   8. Chronic pain of left knee  Ambulatory Referral to Orthopedic Surgery     Patient has type 2 diabetes that is under excellent control.  He has mild microalbuminuria that has been stable.  I have considered adding an ACE or ARB but his blood pressure tends to run low and I think this would be risky.  Hyperlipidemia is under good control on the current regimen.  His thyroid is therapeutic.  Vitamin D has been in the normal range.    Plan is as follows: No change in current medical regimen.  Strongly recommend low carbohydrate diet, exercise, and weight loss.  Refill medications.  I will have patient follow-up after Sudha 3, 2021 with lab prior for his annual physical.  Otherwise he will follow-up as needed.  Encourage patient to get diabetic eye exam.    Addendum: At the end of the visit patient is complaining of intermittent and sometimes severe left knee pain, particularly when he climbs up and down ladders which he has to do at work.  I am going to refer patient to orthopedist for further evaluation.    Procedures

## 2021-06-07 ENCOUNTER — LAB (OUTPATIENT)
Dept: LAB | Facility: HOSPITAL | Age: 61
End: 2021-06-07

## 2021-06-07 DIAGNOSIS — Z00.00 ROUTINE PHYSICAL EXAMINATION: ICD-10-CM

## 2021-06-07 DIAGNOSIS — E11.29 TYPE 2 DIABETES MELLITUS WITH MICROALBUMINURIA, WITHOUT LONG-TERM CURRENT USE OF INSULIN (HCC): Chronic | ICD-10-CM

## 2021-06-07 DIAGNOSIS — R80.9 MICROALBUMINURIA: Chronic | ICD-10-CM

## 2021-06-07 DIAGNOSIS — E78.2 MIXED HYPERLIPIDEMIA: Chronic | ICD-10-CM

## 2021-06-07 DIAGNOSIS — E55.9 VITAMIN D DEFICIENCY: Chronic | ICD-10-CM

## 2021-06-07 DIAGNOSIS — E03.9 PRIMARY HYPOTHYROIDISM: Chronic | ICD-10-CM

## 2021-06-07 DIAGNOSIS — R80.9 TYPE 2 DIABETES MELLITUS WITH MICROALBUMINURIA, WITHOUT LONG-TERM CURRENT USE OF INSULIN (HCC): Chronic | ICD-10-CM

## 2021-06-07 DIAGNOSIS — Z51.81 THERAPEUTIC DRUG MONITORING: ICD-10-CM

## 2021-06-07 LAB
25(OH)D3 SERPL-MCNC: 46.9 NG/ML (ref 30–100)
ALBUMIN SERPL-MCNC: 4.6 G/DL (ref 3.5–5.2)
ALBUMIN UR-MCNC: 1.5 MG/DL
ALBUMIN/GLOB SERPL: 2.1 G/DL
ALP SERPL-CCNC: 62 U/L (ref 39–117)
ALT SERPL W P-5'-P-CCNC: 28 U/L (ref 1–41)
ANION GAP SERPL CALCULATED.3IONS-SCNC: 11.2 MMOL/L (ref 5–15)
AST SERPL-CCNC: 29 U/L (ref 1–40)
BILIRUB SERPL-MCNC: 0.6 MG/DL (ref 0–1.2)
BILIRUB UR QL STRIP: NEGATIVE
BUN SERPL-MCNC: 14 MG/DL (ref 8–23)
BUN/CREAT SERPL: 14.1 (ref 7–25)
CALCIUM SPEC-SCNC: 9.7 MG/DL (ref 8.6–10.5)
CHLORIDE SERPL-SCNC: 103 MMOL/L (ref 98–107)
CK SERPL-CCNC: 286 U/L (ref 20–200)
CLARITY UR: CLEAR
CO2 SERPL-SCNC: 24.8 MMOL/L (ref 22–29)
COLOR UR: ABNORMAL
CREAT SERPL-MCNC: 0.99 MG/DL (ref 0.76–1.27)
CREAT UR-MCNC: 174.2 MG/DL
DEPRECATED RDW RBC AUTO: 40.2 FL (ref 37–54)
ERYTHROCYTE [DISTWIDTH] IN BLOOD BY AUTOMATED COUNT: 12.4 % (ref 12.3–15.4)
GFR SERPL CREATININE-BSD FRML MDRD: 77 ML/MIN/1.73
GLOBULIN UR ELPH-MCNC: 2.2 GM/DL
GLUCOSE SERPL-MCNC: 101 MG/DL (ref 65–99)
GLUCOSE UR STRIP-MCNC: NEGATIVE MG/DL
HBA1C MFR BLD: 6.26 % (ref 4.8–5.6)
HCT VFR BLD AUTO: 48.9 % (ref 37.5–51)
HGB BLD-MCNC: 16.6 G/DL (ref 13–17.7)
HGB UR QL STRIP.AUTO: NEGATIVE
KETONES UR QL STRIP: ABNORMAL
LEUKOCYTE ESTERASE UR QL STRIP.AUTO: NEGATIVE
MCH RBC QN AUTO: 30.3 PG (ref 26.6–33)
MCHC RBC AUTO-ENTMCNC: 33.9 G/DL (ref 31.5–35.7)
MCV RBC AUTO: 89.2 FL (ref 79–97)
MICROALBUMIN/CREAT UR: 8.6 MG/G
NITRITE UR QL STRIP: NEGATIVE
PH UR STRIP.AUTO: 5.5 [PH] (ref 5–8)
PLATELET # BLD AUTO: 202 10*3/MM3 (ref 140–450)
PMV BLD AUTO: 12.1 FL (ref 6–12)
POTASSIUM SERPL-SCNC: 4.5 MMOL/L (ref 3.5–5.2)
PROT SERPL-MCNC: 6.8 G/DL (ref 6–8.5)
PROT UR QL STRIP: NEGATIVE
PSA SERPL-MCNC: 2.27 NG/ML (ref 0–4)
RBC # BLD AUTO: 5.48 10*6/MM3 (ref 4.14–5.8)
SODIUM SERPL-SCNC: 139 MMOL/L (ref 136–145)
SP GR UR STRIP: 1.03 (ref 1–1.03)
T3FREE SERPL-MCNC: 2.9 PG/ML (ref 2–4.4)
T4 FREE SERPL-MCNC: 1.34 NG/DL (ref 0.93–1.7)
TSH SERPL DL<=0.05 MIU/L-ACNC: 1.36 UIU/ML (ref 0.27–4.2)
UROBILINOGEN UR QL STRIP: ABNORMAL
WBC # BLD AUTO: 13.48 10*3/MM3 (ref 3.4–10.8)

## 2021-06-07 PROCEDURE — 82570 ASSAY OF URINE CREATININE: CPT

## 2021-06-07 PROCEDURE — 83036 HEMOGLOBIN GLYCOSYLATED A1C: CPT

## 2021-06-07 PROCEDURE — 84481 FREE ASSAY (FT-3): CPT

## 2021-06-07 PROCEDURE — 82550 ASSAY OF CK (CPK): CPT

## 2021-06-07 PROCEDURE — 80053 COMPREHEN METABOLIC PANEL: CPT

## 2021-06-07 PROCEDURE — 84443 ASSAY THYROID STIM HORMONE: CPT

## 2021-06-07 PROCEDURE — 82306 VITAMIN D 25 HYDROXY: CPT

## 2021-06-07 PROCEDURE — 84153 ASSAY OF PSA TOTAL: CPT

## 2021-06-07 PROCEDURE — 36415 COLL VENOUS BLD VENIPUNCTURE: CPT

## 2021-06-07 PROCEDURE — 80061 LIPID PANEL: CPT

## 2021-06-07 PROCEDURE — 83704 LIPOPROTEIN BLD QUAN PART: CPT

## 2021-06-07 PROCEDURE — 82043 UR ALBUMIN QUANTITATIVE: CPT

## 2021-06-07 PROCEDURE — 81003 URINALYSIS AUTO W/O SCOPE: CPT

## 2021-06-07 PROCEDURE — 85027 COMPLETE CBC AUTOMATED: CPT

## 2021-06-07 PROCEDURE — 84439 ASSAY OF FREE THYROXINE: CPT

## 2021-06-09 LAB
CHOLEST SERPL-MCNC: 116 MG/DL (ref 100–199)
HDL SERPL-SCNC: 33.8 UMOL/L
HDLC SERPL-MCNC: 52 MG/DL
LDL SERPL QN: 19.7 NM
LDL SERPL-SCNC: 678 NMOL/L
LDL SMALL SERPL-SCNC: 458 NMOL/L
LDLC SERPL CALC-MCNC: 47 MG/DL (ref 0–99)
TRIGL SERPL-MCNC: 86 MG/DL (ref 0–149)

## 2021-06-14 ENCOUNTER — OFFICE VISIT (OUTPATIENT)
Dept: INTERNAL MEDICINE | Facility: CLINIC | Age: 61
End: 2021-06-14

## 2021-06-14 VITALS
DIASTOLIC BLOOD PRESSURE: 74 MMHG | SYSTOLIC BLOOD PRESSURE: 123 MMHG | HEIGHT: 70 IN | OXYGEN SATURATION: 95 % | WEIGHT: 183 LBS | TEMPERATURE: 98 F | BODY MASS INDEX: 26.2 KG/M2 | HEART RATE: 72 BPM | RESPIRATION RATE: 16 BRPM

## 2021-06-14 DIAGNOSIS — G89.29 CHRONIC LEFT SHOULDER PAIN: ICD-10-CM

## 2021-06-14 DIAGNOSIS — R80.9 TYPE 2 DIABETES MELLITUS WITH MICROALBUMINURIA, WITHOUT LONG-TERM CURRENT USE OF INSULIN (HCC): Chronic | ICD-10-CM

## 2021-06-14 DIAGNOSIS — E11.9 ENCOUNTER FOR DIABETIC FOOT EXAM (HCC): Chronic | ICD-10-CM

## 2021-06-14 DIAGNOSIS — E03.9 PRIMARY HYPOTHYROIDISM: Chronic | ICD-10-CM

## 2021-06-14 DIAGNOSIS — R80.9 MICROALBUMINURIA: Chronic | ICD-10-CM

## 2021-06-14 DIAGNOSIS — G89.29 CHRONIC PAIN OF LEFT KNEE: ICD-10-CM

## 2021-06-14 DIAGNOSIS — M25.512 CHRONIC LEFT SHOULDER PAIN: ICD-10-CM

## 2021-06-14 DIAGNOSIS — E11.29 TYPE 2 DIABETES MELLITUS WITH MICROALBUMINURIA, WITHOUT LONG-TERM CURRENT USE OF INSULIN (HCC): Chronic | ICD-10-CM

## 2021-06-14 DIAGNOSIS — Z00.00 ROUTINE PHYSICAL EXAMINATION: Primary | ICD-10-CM

## 2021-06-14 DIAGNOSIS — E78.2 MIXED HYPERLIPIDEMIA: Chronic | ICD-10-CM

## 2021-06-14 DIAGNOSIS — M25.562 CHRONIC PAIN OF LEFT KNEE: ICD-10-CM

## 2021-06-14 DIAGNOSIS — E55.9 VITAMIN D DEFICIENCY: Chronic | ICD-10-CM

## 2021-06-14 DIAGNOSIS — Z51.81 THERAPEUTIC DRUG MONITORING: ICD-10-CM

## 2021-06-14 PROBLEM — M25.511 CHRONIC RIGHT SHOULDER PAIN: Status: ACTIVE | Noted: 2021-06-14

## 2021-06-14 PROCEDURE — 99396 PREV VISIT EST AGE 40-64: CPT | Performed by: INTERNAL MEDICINE

## 2021-06-14 PROCEDURE — 99213 OFFICE O/P EST LOW 20 MIN: CPT | Performed by: INTERNAL MEDICINE

## 2021-06-14 RX ORDER — MELOXICAM 15 MG/1
TABLET ORAL
Qty: 30 TABLET | Refills: 6
Start: 2021-06-14 | End: 2022-12-22

## 2021-06-14 RX ORDER — MELOXICAM 15 MG/1
15 TABLET ORAL DAILY
COMMUNITY
Start: 2021-05-13 | End: 2021-06-14 | Stop reason: SDUPTHER

## 2021-06-14 NOTE — PROGRESS NOTES
06/14/2021    Patient Information  Prudencio Mckinley                                                                                          [address]  [place] [address]      1960  [unfilled]  [phone] (work)    Chief Complaint:     Routine physical examination and follow-up blood work in order to monitor chronic medical issues listed in history of present illness.    History of Present Illness:    Patient with a history of type 2 diabetes, microalbuminuria, hyperlipidemia, hypothyroidism, vitamin D deficiency.  He presents today for his routine annual physical exam and follow-up blood work.  His past medical history reviewed and updated were necessary including health maintenance parameters.  This reveals he will be up-to-date or else accounted for after today's visit with the exception of needing diabetic eye exam which I encouraged him to get.    Review of Systems   Constitutional: Negative.   HENT: Negative.    Eyes: Negative.    Cardiovascular: Negative.    Respiratory: Negative.    Endocrine: Negative.    Hematologic/Lymphatic: Negative.    Skin: Negative.    Musculoskeletal: Negative.    Gastrointestinal: Negative.    Genitourinary: Negative.    Neurological: Negative.    Psychiatric/Behavioral: Negative.    Allergic/Immunologic: Negative.        Active Problems:    Patient Active Problem List   Diagnosis   • Hiatal hernia   • Hyperlipidemia   • Primary hypothyroidism   • Microalbuminuria   • Type 2 diabetes mellitus with microalbuminuria, without long-term current use of insulin (CMS/[facility])   • Vitamin D deficiency   • Therapeutic drug monitoring   • Routine physical examination   • Diabetic foot exam   • Diabetic eye exam (CMS/[facility])   • Chronic pain of left knee   • Chronic left shoulder pain         Past Medical History:   Diagnosis Date   • Chronic pain of left knee 1/20/2021   • Hiatal hernia 7/28/2011 07/28/2011--3 cm hiatal hernia, otherwise normal EGD.   • History of  coronary angiogram 2010 2010-- patient had a heart catheterization which was normal.   • History of nephrolithiasis Approximately 1985    Approximately 1985--patient passed a kidney stone stone spontaneously.   • History of peptic ulcer Approximately 1979    Approximately 1979--patient was told he had a peptic ulcer and was placed on Mylanta.  It sounds as if he had an EGD.   • Hyperlipidemia 6/22/2012 06/22/2012--treatment for hyperlipidemia begun.   • Hypothyroidism 4/27/2012 05/04/2012--treatment for hypothyroidism begun.  05/01/2012--ultrasound of the thyroid revealed increased hypervascularity, slightly heterogeneous echo.   04/27/2012--initial diagnosis of hypothyroidism.   • Microalbuminuria 5/15/2015    05/15/2015--urine microalbumin slightly elevated at 19.4. Normal range 0.0--17.0.   • Vitamin D deficiency 3/21/2016         Past Surgical History:   Procedure Laterality Date   • COLONOSCOPY  07/28/2011 07/28/2011--normal colonoscopy with an excellent prep.   • ESOPHAGOSCOPY / EGD  07/28/2011 07/28/2011--3 cm hiatal hernia, otherwise normal EGD.   • TONSILLECTOMY      11 years old.         No Known Allergies        Current Outpatient Medications:   •  Ascorbic Acid (VITAMIN C PO), Take  by mouth., Disp: , Rfl:   •  atorvastatin (LIPITOR) 20 MG tablet, Take 1 p.o. daily for high cholesterol, Disp: 30 tablet, Rfl: 11  •  Cholecalciferol (VITAMIN D3) 5000 UNITS capsule capsule, Take  by mouth., Disp: , Rfl:   •  ezetimibe (ZETIA) 10 MG tablet, Take 1 p.o. daily for high cholesterol, Disp: 30 tablet, Rfl: 11  •  levothyroxine (SYNTHROID, LEVOTHROID) 100 MCG tablet, Take 1 p.o. daily for low thyroid, Disp: 90 tablet, Rfl: 3  •  meloxicam (MOBIC) 15 MG tablet, Take 1 p.o. daily for arthritis of the knees, Disp: 30 tablet, Rfl: 6  •  multivitamin (THERAGRAN) tablet tablet, Take 1 tablet by mouth daily., Disp: , Rfl:   •  SITagliptin-metFORMIN HCl ER (Janumet XR) 100-1000 MG tablet, Take 1 p.o. daily  "with food for diabetes, Disp: 30 tablet, Rfl: 11      Family History   Problem Relation Age of Onset   • Coronary artery disease Mother    • Diabetes Mother    • Hypertension Mother         Essential   • Hyperlipidemia Mother    • Diabetes Father    • Hypertension Father         Essential   • Hyperlipidemia Father    • Coronary artery disease Brother    • Diabetes Brother    • Hypertension Brother         Essential   • Hyperlipidemia Brother          Social History     Socioeconomic History   • Marital status:      Spouse name: Not on file   • Number of children: Not on file   • Years of education: Not on file   • Highest education level: Not on file   Tobacco Use   • Smoking status: Former Smoker     Types: Cigars   • Smokeless tobacco: Never Used   Vaping Use   • Vaping Use: Never used   Substance and Sexual Activity   • Alcohol use: Yes     Comment: Moderately   • Drug use: No   • Sexual activity: Yes     Partners: Female         Vitals:    06/14/21 1405   BP: 123/74   Pulse: 72   Resp: 16   Temp: 98 °F (36.7 °C)   SpO2: 95%   Weight: 83 kg (183 lb)   Height: 177.8 cm (70\")        Body mass index is 26.26 kg/m².      Physical Exam:    General: Alert and oriented x 3.  No acute distress.  Obese.  Normal affect.  HEENT: Pupils equal, round, reactive to light; extraocular movements intact; sclerae nonicteric; pharynx, ear canals and TMs normal.  Neck: Without JVD, thyromegaly, bruit, or adenopathy.  Lungs: Clear to auscultation in all fields.  Heart: Regular rate and rhythm without murmur, rub, gallop, or click.  Abdomen: Soft, nontender, without hepatosplenomegaly or hernia.  Bowel sounds normal.  : Deferred.  Rectal: Deferred.  Extremities: Without clubbing, cyanosis, edema, or pulse deficit.  Neurologic: Intact without focal deficit.  Normal station and gait observed during ingress and egress from the examination room.  Skin: Without significant lesion.  Musculoskeletal: Unremarkable.    June 14, " 2021--routine diabetic foot exam reveals no evidence of diabetic foot ulcer or preulcerative callus.  Distal pulses are palpable.  Sensation intact.    Lab/other results:    NMR reveals total cholesterol 116.  Triglycerides 86.  LDL particle #678.  Small LDL particle #458.  HDL particle number normal at 33.8.  CMP normal except glucose 101.  Hemoglobin A1c 6.26.  Urinalysis normal.  Thyroid function test normal.  PSA normal at 2.27.  Vitamin D normal at 46.9.  CBC normal except white count mildly elevated at 13.48.    Assessment/Plan:     Diagnosis Plan   1. Routine physical examination     2. Type 2 diabetes mellitus with microalbuminuria, without long-term current use of insulin (CMS/[facility])  Comprehensive Metabolic Panel    Hemoglobin A1c   3. Microalbuminuria     4. Hyperlipidemia  CK    Comprehensive Metabolic Panel    NMR LipoProfile   5. Primary hypothyroidism  TSH    T4, Free    T3, Free   6. Vitamin D deficiency  Vitamin D 25 Hydroxy   7. Diabetic foot exam     8. Therapeutic drug monitoring  CBC (No Diff)   9. Chronic pain of left knee  meloxicam (MOBIC) 15 MG tablet   10. Chronic left shoulder pain  Ambulatory Referral to Orthopedic Surgery     Patient presents with essentially normal annual physical except for the following issues: He has type 2 diabetes that is under excellent control.  Microalbuminuria is mild and stable.  Hyperlipidemia is also under excellent control.  Primary hypothyroidism is well controlled on current dose of Synthroid.  Vitamin D in the normal range.    Plan is as follows: Patient instructed to follow a low carbohydrate diet, exercise, and weight loss.  No changes in current medical regimen.  Patient will follow-up in 6 months with lab prior or follow-up as needed.    Addendum: Acute problem addressed as well as a chronic problem.  Patient saw the orthopedic surgeon assistant and was placed on meloxicam for tendinitis in regards to the chronic pain of the left knee.  This has  definitely helped and he is not really having any symptoms although he continues take the meloxicam.  He has been on this for about 6 months.  I recommended he try going off of it and see if his pain remains resolved.  It may be that he will have to take that anymore or perhaps only take it intermittently.  The potential side effects of anti-inflammatories discussed.  Also patient is complaining of left shoulder pain which is described under the diagnosis of chronic left shoulder pain.  I have referred him back to orthopedics.  He probably should stay on the meloxicam for now.    Procedures

## 2021-12-08 ENCOUNTER — LAB (OUTPATIENT)
Dept: LAB | Facility: HOSPITAL | Age: 61
End: 2021-12-08

## 2021-12-08 DIAGNOSIS — E11.29 TYPE 2 DIABETES MELLITUS WITH MICROALBUMINURIA, WITHOUT LONG-TERM CURRENT USE OF INSULIN (HCC): Chronic | ICD-10-CM

## 2021-12-08 DIAGNOSIS — R80.9 TYPE 2 DIABETES MELLITUS WITH MICROALBUMINURIA, WITHOUT LONG-TERM CURRENT USE OF INSULIN (HCC): Chronic | ICD-10-CM

## 2021-12-08 DIAGNOSIS — E55.9 VITAMIN D DEFICIENCY: Chronic | ICD-10-CM

## 2021-12-08 DIAGNOSIS — E78.2 MIXED HYPERLIPIDEMIA: Chronic | ICD-10-CM

## 2021-12-08 DIAGNOSIS — E03.9 PRIMARY HYPOTHYROIDISM: Chronic | ICD-10-CM

## 2021-12-08 DIAGNOSIS — Z51.81 THERAPEUTIC DRUG MONITORING: ICD-10-CM

## 2021-12-08 LAB
25(OH)D3 SERPL-MCNC: 54.4 NG/ML (ref 30–100)
ALBUMIN SERPL-MCNC: 4.3 G/DL (ref 3.5–5.2)
ALBUMIN/GLOB SERPL: 1.5 G/DL
ALP SERPL-CCNC: 62 U/L (ref 39–117)
ALT SERPL W P-5'-P-CCNC: 35 U/L (ref 1–41)
ANION GAP SERPL CALCULATED.3IONS-SCNC: 7.3 MMOL/L (ref 5–15)
AST SERPL-CCNC: 23 U/L (ref 1–40)
BILIRUB SERPL-MCNC: 0.4 MG/DL (ref 0–1.2)
BUN SERPL-MCNC: 9 MG/DL (ref 8–23)
BUN/CREAT SERPL: 10.5 (ref 7–25)
CALCIUM SPEC-SCNC: 10 MG/DL (ref 8.6–10.5)
CHLORIDE SERPL-SCNC: 101 MMOL/L (ref 98–107)
CK SERPL-CCNC: 64 U/L (ref 20–200)
CO2 SERPL-SCNC: 28.7 MMOL/L (ref 22–29)
CREAT SERPL-MCNC: 0.86 MG/DL (ref 0.76–1.27)
DEPRECATED RDW RBC AUTO: 43 FL (ref 37–54)
ERYTHROCYTE [DISTWIDTH] IN BLOOD BY AUTOMATED COUNT: 12.6 % (ref 12.3–15.4)
GFR SERPL CREATININE-BSD FRML MDRD: 91 ML/MIN/1.73
GLOBULIN UR ELPH-MCNC: 2.8 GM/DL
GLUCOSE SERPL-MCNC: 109 MG/DL (ref 65–99)
HBA1C MFR BLD: 6.2 % (ref 4.8–5.6)
HCT VFR BLD AUTO: 49 % (ref 37.5–51)
HGB BLD-MCNC: 16.2 G/DL (ref 13–17.7)
MCH RBC QN AUTO: 30.6 PG (ref 26.6–33)
MCHC RBC AUTO-ENTMCNC: 33.1 G/DL (ref 31.5–35.7)
MCV RBC AUTO: 92.5 FL (ref 79–97)
PLATELET # BLD AUTO: 214 10*3/MM3 (ref 140–450)
PMV BLD AUTO: 12.2 FL (ref 6–12)
POTASSIUM SERPL-SCNC: 4.5 MMOL/L (ref 3.5–5.2)
PROT SERPL-MCNC: 7.1 G/DL (ref 6–8.5)
RBC # BLD AUTO: 5.3 10*6/MM3 (ref 4.14–5.8)
SODIUM SERPL-SCNC: 137 MMOL/L (ref 136–145)
T3FREE SERPL-MCNC: 3.15 PG/ML (ref 2–4.4)
T4 FREE SERPL-MCNC: 1.34 NG/DL (ref 0.93–1.7)
TSH SERPL DL<=0.05 MIU/L-ACNC: 1.19 UIU/ML (ref 0.27–4.2)
WBC NRBC COR # BLD: 12.94 10*3/MM3 (ref 3.4–10.8)

## 2021-12-08 PROCEDURE — 82306 VITAMIN D 25 HYDROXY: CPT

## 2021-12-08 PROCEDURE — 84439 ASSAY OF FREE THYROXINE: CPT

## 2021-12-08 PROCEDURE — 84443 ASSAY THYROID STIM HORMONE: CPT

## 2021-12-08 PROCEDURE — 82550 ASSAY OF CK (CPK): CPT

## 2021-12-08 PROCEDURE — 85027 COMPLETE CBC AUTOMATED: CPT

## 2021-12-08 PROCEDURE — 83704 LIPOPROTEIN BLD QUAN PART: CPT

## 2021-12-08 PROCEDURE — 80053 COMPREHEN METABOLIC PANEL: CPT

## 2021-12-08 PROCEDURE — 80061 LIPID PANEL: CPT

## 2021-12-08 PROCEDURE — 84481 FREE ASSAY (FT-3): CPT

## 2021-12-08 PROCEDURE — 36415 COLL VENOUS BLD VENIPUNCTURE: CPT

## 2021-12-08 PROCEDURE — 83036 HEMOGLOBIN GLYCOSYLATED A1C: CPT

## 2021-12-10 LAB
CHOLEST SERPL-MCNC: 140 MG/DL (ref 100–199)
HDL SERPL-SCNC: 42.2 UMOL/L
HDLC SERPL-MCNC: 54 MG/DL
LDL SERPL QN: 20 NM
LDL SERPL-SCNC: 707 NMOL/L
LDL SMALL SERPL-SCNC: 448 NMOL/L
LDLC SERPL CALC-MCNC: 60 MG/DL (ref 0–99)
TRIGL SERPL-MCNC: 152 MG/DL (ref 0–149)

## 2021-12-15 ENCOUNTER — OFFICE VISIT (OUTPATIENT)
Dept: INTERNAL MEDICINE | Facility: CLINIC | Age: 61
End: 2021-12-15

## 2021-12-15 VITALS
SYSTOLIC BLOOD PRESSURE: 118 MMHG | HEART RATE: 61 BPM | HEIGHT: 70 IN | WEIGHT: 175 LBS | TEMPERATURE: 98.2 F | DIASTOLIC BLOOD PRESSURE: 82 MMHG | OXYGEN SATURATION: 98 % | BODY MASS INDEX: 25.05 KG/M2

## 2021-12-15 DIAGNOSIS — R80.9 TYPE 2 DIABETES MELLITUS WITH MICROALBUMINURIA, WITHOUT LONG-TERM CURRENT USE OF INSULIN (HCC): Primary | Chronic | ICD-10-CM

## 2021-12-15 DIAGNOSIS — Z00.00 ROUTINE PHYSICAL EXAMINATION: ICD-10-CM

## 2021-12-15 DIAGNOSIS — E55.9 VITAMIN D DEFICIENCY: Chronic | ICD-10-CM

## 2021-12-15 DIAGNOSIS — R80.9 MICROALBUMINURIA: Chronic | ICD-10-CM

## 2021-12-15 DIAGNOSIS — E11.29 TYPE 2 DIABETES MELLITUS WITH MICROALBUMINURIA, WITHOUT LONG-TERM CURRENT USE OF INSULIN (HCC): Primary | Chronic | ICD-10-CM

## 2021-12-15 DIAGNOSIS — E03.9 PRIMARY HYPOTHYROIDISM: Chronic | ICD-10-CM

## 2021-12-15 DIAGNOSIS — E78.2 MIXED HYPERLIPIDEMIA: Chronic | ICD-10-CM

## 2021-12-15 DIAGNOSIS — Z51.81 THERAPEUTIC DRUG MONITORING: ICD-10-CM

## 2021-12-15 DIAGNOSIS — Z01.00 DIABETIC EYE EXAM (HCC): Chronic | ICD-10-CM

## 2021-12-15 DIAGNOSIS — E11.9 DIABETIC EYE EXAM (HCC): Chronic | ICD-10-CM

## 2021-12-15 PROCEDURE — 99214 OFFICE O/P EST MOD 30 MIN: CPT | Performed by: INTERNAL MEDICINE

## 2021-12-15 NOTE — PROGRESS NOTES
12/15/2021    Patient Information  Prudencio Mckinley                                                                                          [address]  [place] [address]      1960  [unfilled]  [phone] (work)    Chief Complaint:     Follow-up blood work in order to monitor chronic medical issues listed in history of present illness.  No new acute complaints.    History of Present Illness:    Patient with type 2 diabetes, microalbuminuria, hypothyroidism, hyperlipidemia, vitamin D deficiency.  He presents today for a follow-up with lab prior in order to monitor his chronic medical issues.  His past medical history reviewed and updated were necessary including health maintenance parameters.  This reveals he apparently needs influenza vaccine but we are currently out of stock.  It also appears he has not had Covid-19 vaccination.  See below.  He also needs a diabetic eye exam which I encouraged him to get.      Review of Systems   Constitutional: Negative.   HENT: Negative.    Eyes: Negative.    Cardiovascular: Negative.    Respiratory: Negative.    Endocrine: Negative.    Hematologic/Lymphatic: Negative.    Skin: Negative.    Musculoskeletal: Negative.    Gastrointestinal: Negative.    Genitourinary: Negative.    Neurological: Negative.    Psychiatric/Behavioral: Negative.    Allergic/Immunologic: Negative.        Active Problems:    Patient Active Problem List   Diagnosis   • Hiatal hernia   • Hyperlipidemia   • Primary hypothyroidism   • Microalbuminuria   • Type 2 diabetes mellitus with microalbuminuria, without long-term current use of insulin (HCC)   • Vitamin D deficiency   • Therapeutic drug monitoring   • Routine physical examination   • Diabetic foot exam   • Diabetic eye exam (HCC)   • Chronic pain of left knee   • Chronic left shoulder pain         Past Medical History:   Diagnosis Date   • Chronic pain of left knee 1/20/2021   • Hiatal hernia 7/28/2011 07/28/2011--3 cm  hiatal hernia, otherwise normal EGD.   • History of coronary angiogram 2010 2010-- patient had a heart catheterization which was normal.   • History of nephrolithiasis Approximately 1985    Approximately 1985--patient passed a kidney stone stone spontaneously.   • History of peptic ulcer Approximately 1979    Approximately 1979--patient was told he had a peptic ulcer and was placed on Mylanta.  It sounds as if he had an EGD.   • Hyperlipidemia 6/22/2012 06/22/2012--treatment for hyperlipidemia begun.   • Hypothyroidism 4/27/2012 05/04/2012--treatment for hypothyroidism begun.  05/01/2012--ultrasound of the thyroid revealed increased hypervascularity, slightly heterogeneous echo.   04/27/2012--initial diagnosis of hypothyroidism.   • Microalbuminuria 5/15/2015    05/15/2015--urine microalbumin slightly elevated at 19.4. Normal range 0.0--17.0.   • Vitamin D deficiency 3/21/2016         Past Surgical History:   Procedure Laterality Date   • COLONOSCOPY  07/28/2011 07/28/2011--normal colonoscopy with an excellent prep.   • ESOPHAGOSCOPY / EGD  07/28/2011 07/28/2011--3 cm hiatal hernia, otherwise normal EGD.   • TONSILLECTOMY      11 years old.         No Known Allergies        Current Outpatient Medications:   •  Ascorbic Acid (VITAMIN C PO), Take  by mouth., Disp: , Rfl:   •  atorvastatin (LIPITOR) 20 MG tablet, Take 1 p.o. daily for high cholesterol, Disp: 30 tablet, Rfl: 11  •  Cholecalciferol (VITAMIN D3) 5000 UNITS capsule capsule, Take  by mouth., Disp: , Rfl:   •  ezetimibe (ZETIA) 10 MG tablet, Take 1 p.o. daily for high cholesterol, Disp: 30 tablet, Rfl: 11  •  levothyroxine (SYNTHROID, LEVOTHROID) 100 MCG tablet, Take 1 p.o. daily for low thyroid, Disp: 90 tablet, Rfl: 3  •  meloxicam (MOBIC) 15 MG tablet, Take 1 p.o. daily for arthritis of the knees, Disp: 30 tablet, Rfl: 6  •  multivitamin (THERAGRAN) tablet tablet, Take 1 tablet by mouth daily., Disp: , Rfl:   •  SITagliptin-metFORMIN HCl ER  "(Janumet XR) 100-1000 MG tablet, Take 1 p.o. daily with food for diabetes, Disp: 30 tablet, Rfl: 11      Family History   Problem Relation Age of Onset   • Coronary artery disease Mother    • Diabetes Mother    • Hypertension Mother         Essential   • Hyperlipidemia Mother    • Diabetes Father    • Hypertension Father         Essential   • Hyperlipidemia Father    • Coronary artery disease Brother    • Diabetes Brother    • Hypertension Brother         Essential   • Hyperlipidemia Brother          Social History     Socioeconomic History   • Marital status:    Tobacco Use   • Smoking status: Former Smoker     Types: Cigars   • Smokeless tobacco: Never Used   Vaping Use   • Vaping Use: Never used   Substance and Sexual Activity   • Alcohol use: Yes     Comment: Moderately   • Drug use: No   • Sexual activity: Yes     Partners: Female         Vitals:    12/15/21 1433   BP: 118/82   Pulse: 61   Temp: 98.2 °F (36.8 °C)   SpO2: 98%   Weight: 79.4 kg (175 lb)   Height: 177.8 cm (70\")        Body mass index is 25.11 kg/m².      Physical Exam:    General: Alert and oriented x 3.  No acute distress.  Normal affect.  HEENT: Pupils equal, round, reactive to light; extraocular movements intact; sclerae nonicteric; pharynx, ear canals and TMs normal.  Neck: Without JVD, thyromegaly, bruit, or adenopathy.  Lungs: Clear to auscultation in all fields.  Heart: Regular rate and rhythm without murmur, rub, gallop, or click.  Abdomen: Soft, nontender, without hepatosplenomegaly or hernia.  Bowel sounds normal.  : Deferred.  Rectal: Deferred.  Extremities: Without clubbing, cyanosis, edema, or pulse deficit.  Neurologic: Intact without focal deficit.  Normal station and gait observed during ingress and egress from the examination room.  Skin: Without significant lesion.  Musculoskeletal: Unremarkable.    Lab/other results:    CBC is normal except white count mildly elevated at 12.94.  CPK normal.  CMP normal except glucose " 109.  Hemoglobin A1c 6.2.  NMR reveals total cholesterol 140.  Triglycerides 152.  LDL particle number excellent 707.  HDL particle number good at 42.2.  Small LDL particle number normal at 448.  Thyroid function test normal.  Vitamin D normal.    Assessment/Plan:     Diagnosis Plan   1. Type 2 diabetes mellitus with microalbuminuria, without long-term current use of insulin ([facility])  Hemoglobin A1c    Microalbumin / Creatinine Urine Ratio - Urine, Clean Catch    Urinalysis With Microscopic If Indicated (No Culture) - Urine, Clean Catch   2. Microalbuminuria     3. Primary hypothyroidism  TSH    T4, Free    T3, Free   4. Hyperlipidemia  CK    Comprehensive Metabolic Panel    NMR LipoProfile   5. Vitamin D deficiency  Vitamin D 25 Hydroxy   6. Diabetic eye exam ([facility])     7. Therapeutic drug monitoring  CBC (No Diff)    PSA DIAGNOSTIC   8. Routine physical examination  CBC (No Diff)    CK    Comprehensive Metabolic Panel    Hemoglobin A1c    Microalbumin / Creatinine Urine Ratio - Urine, Clean Catch    NMR LipoProfile    Vitamin D 25 Hydroxy    Urinalysis With Microscopic If Indicated (No Culture) - Urine, Clean Catch    TSH    T4, Free    T3, Free    PSA DIAGNOSTIC     Patient has type 2 diabetes is under excellent control.  He has microalbuminuria which will be checked at his next physical in a few months.  Hyperlipidemia is under excellent control.  Vitamin D therapeutic.  Patient needs his diabetic eye exam and I encouraged him to get this.  Overall he seems to be doing fairly well.    Plan is as follows: No change in current medical regimen.  Work on low carbohydrate diet, exercise, and attain ideal body weight.  Strongly recommend influenza and Covid-19 vaccine.  Patient will follow-up after Sudha 15, 2022 with lab prior for his annual physical.  Otherwise he will follow-up as needed.      Procedures

## 2021-12-27 DIAGNOSIS — E03.9 PRIMARY HYPOTHYROIDISM: Chronic | ICD-10-CM

## 2021-12-29 RX ORDER — LEVOTHYROXINE SODIUM 0.1 MG/1
TABLET ORAL
Qty: 90 TABLET | Refills: 3 | Status: SHIPPED | OUTPATIENT
Start: 2021-12-29 | End: 2022-04-18

## 2021-12-31 ENCOUNTER — HOSPITAL ENCOUNTER (OUTPATIENT)
Facility: HOSPITAL | Age: 61
Setting detail: OBSERVATION
Discharge: HOME | End: 2022-01-01
Attending: EMERGENCY MEDICINE | Admitting: EMERGENCY MEDICINE

## 2021-12-31 ENCOUNTER — APPOINTMENT (OUTPATIENT)
Dept: GENERAL RADIOLOGY | Facility: HOSPITAL | Age: 61
End: 2021-12-31

## 2021-12-31 DIAGNOSIS — R07.9 CHEST PAIN, UNSPECIFIED TYPE: Primary | ICD-10-CM

## 2021-12-31 LAB
ANION GAP SERPL CALCULATED.3IONS-SCNC: 9 MMOL/L (ref 5–15)
APTT PPP: 26.5 SECONDS (ref 24–31)
BASOPHILS # BLD AUTO: 0.1 10*3/MM3 (ref 0–0.2)
BASOPHILS NFR BLD AUTO: 1.7 % (ref 0–1.5)
BUN SERPL-MCNC: 11 MG/DL (ref 8–23)
BUN/CREAT SERPL: 15.5 (ref 7–25)
CALCIUM SPEC-SCNC: 9.1 MG/DL (ref 8.6–10.5)
CHLORIDE SERPL-SCNC: 105 MMOL/L (ref 98–107)
CO2 SERPL-SCNC: 27 MMOL/L (ref 22–29)
CREAT SERPL-MCNC: 0.71 MG/DL (ref 0.76–1.27)
DEPRECATED RDW RBC AUTO: 39.8 FL (ref 37–54)
EOSINOPHIL # BLD AUTO: 0.4 10*3/MM3 (ref 0–0.4)
EOSINOPHIL NFR BLD AUTO: 4.4 % (ref 0.3–6.2)
ERYTHROCYTE [DISTWIDTH] IN BLOOD BY AUTOMATED COUNT: 12.8 % (ref 12.3–15.4)
GFR SERPL CREATININE-BSD FRML MDRD: 113 ML/MIN/1.73
GLUCOSE SERPL-MCNC: 104 MG/DL (ref 65–99)
HCT VFR BLD AUTO: 40.6 % (ref 37.5–51)
HGB BLD-MCNC: 13.4 G/DL (ref 13–17.7)
HOLD SPECIMEN: NORMAL
INR PPP: 0.97 (ref 0.93–1.1)
LYMPHOCYTES # BLD AUTO: 2.4 10*3/MM3 (ref 0.7–3.1)
LYMPHOCYTES NFR BLD AUTO: 29.7 % (ref 19.6–45.3)
MCH RBC QN AUTO: 29.6 PG (ref 26.6–33)
MCHC RBC AUTO-ENTMCNC: 32.9 G/DL (ref 31.5–35.7)
MCV RBC AUTO: 90.1 FL (ref 79–97)
MONOCYTES # BLD AUTO: 0.6 10*3/MM3 (ref 0.1–0.9)
MONOCYTES NFR BLD AUTO: 7.3 % (ref 5–12)
NEUTROPHILS NFR BLD AUTO: 4.6 10*3/MM3 (ref 1.7–7)
NEUTROPHILS NFR BLD AUTO: 56.9 % (ref 42.7–76)
NRBC BLD AUTO-RTO: 0 /100 WBC (ref 0–0.2)
NT-PROBNP SERPL-MCNC: 173.6 PG/ML (ref 0–900)
PLATELET # BLD AUTO: 205 10*3/MM3 (ref 140–450)
PMV BLD AUTO: 9.3 FL (ref 6–12)
POTASSIUM SERPL-SCNC: 3.9 MMOL/L (ref 3.5–5.2)
PROTHROMBIN TIME: 10.8 SECONDS (ref 9.6–11.7)
RBC # BLD AUTO: 4.51 10*6/MM3 (ref 4.14–5.8)
SARS-COV-2 RNA PNL SPEC NAA+PROBE: NOT DETECTED
SODIUM SERPL-SCNC: 141 MMOL/L (ref 136–145)
TROPONIN T SERPL-MCNC: <0.01 NG/ML (ref 0–0.03)
TROPONIN T SERPL-MCNC: <0.01 NG/ML (ref 0–0.03)
WBC NRBC COR # BLD: 8.1 10*3/MM3 (ref 3.4–10.8)

## 2021-12-31 PROCEDURE — C9803 HOPD COVID-19 SPEC COLLECT: HCPCS

## 2021-12-31 PROCEDURE — 85730 THROMBOPLASTIN TIME PARTIAL: CPT

## 2021-12-31 PROCEDURE — 83880 ASSAY OF NATRIURETIC PEPTIDE: CPT

## 2021-12-31 PROCEDURE — G0378 HOSPITAL OBSERVATION PER HR: HCPCS

## 2021-12-31 PROCEDURE — 99284 EMERGENCY DEPT VISIT MOD MDM: CPT

## 2021-12-31 PROCEDURE — 93005 ELECTROCARDIOGRAM TRACING: CPT | Performed by: EMERGENCY MEDICINE

## 2021-12-31 PROCEDURE — 93005 ELECTROCARDIOGRAM TRACING: CPT

## 2021-12-31 PROCEDURE — U0003 INFECTIOUS AGENT DETECTION BY NUCLEIC ACID (DNA OR RNA); SEVERE ACUTE RESPIRATORY SYNDROME CORONAVIRUS 2 (SARS-COV-2) (CORONAVIRUS DISEASE [COVID-19]), AMPLIFIED PROBE TECHNIQUE, MAKING USE OF HIGH THROUGHPUT TECHNOLOGIES AS DESCRIBED BY CMS-2020-01-R: HCPCS | Performed by: EMERGENCY MEDICINE

## 2021-12-31 PROCEDURE — 36415 COLL VENOUS BLD VENIPUNCTURE: CPT

## 2021-12-31 PROCEDURE — 85610 PROTHROMBIN TIME: CPT

## 2021-12-31 PROCEDURE — 85025 COMPLETE CBC W/AUTO DIFF WBC: CPT

## 2021-12-31 PROCEDURE — 71045 X-RAY EXAM CHEST 1 VIEW: CPT

## 2021-12-31 PROCEDURE — 80048 BASIC METABOLIC PNL TOTAL CA: CPT

## 2021-12-31 PROCEDURE — 84484 ASSAY OF TROPONIN QUANT: CPT

## 2021-12-31 RX ORDER — NALOXONE HCL 0.4 MG/ML
0.4 VIAL (ML) INJECTION
Status: DISCONTINUED | OUTPATIENT
Start: 2021-12-31 | End: 2022-01-01 | Stop reason: HOSPADM

## 2021-12-31 RX ORDER — MORPHINE SULFATE 2 MG/ML
1 INJECTION, SOLUTION INTRAMUSCULAR; INTRAVENOUS EVERY 4 HOURS PRN
Status: DISCONTINUED | OUTPATIENT
Start: 2021-12-31 | End: 2022-01-01 | Stop reason: HOSPADM

## 2021-12-31 RX ORDER — SODIUM CHLORIDE 0.9 % (FLUSH) 0.9 %
10 SYRINGE (ML) INJECTION AS NEEDED
Status: DISCONTINUED | OUTPATIENT
Start: 2021-12-31 | End: 2022-01-01 | Stop reason: HOSPADM

## 2021-12-31 RX ORDER — NITROGLYCERIN 0.4 MG/1
0.4 TABLET SUBLINGUAL
Status: DISCONTINUED | OUTPATIENT
Start: 2021-12-31 | End: 2022-01-01 | Stop reason: HOSPADM

## 2021-12-31 RX ORDER — SODIUM CHLORIDE 0.9 % (FLUSH) 0.9 %
10 SYRINGE (ML) INJECTION EVERY 12 HOURS SCHEDULED
Status: DISCONTINUED | OUTPATIENT
Start: 2021-12-31 | End: 2022-01-01 | Stop reason: HOSPADM

## 2021-12-31 RX ORDER — ASPIRIN 325 MG
325 TABLET ORAL ONCE
Status: COMPLETED | OUTPATIENT
Start: 2021-12-31 | End: 2021-12-31

## 2021-12-31 RX ADMIN — SODIUM CHLORIDE, PRESERVATIVE FREE 10 ML: 5 INJECTION INTRAVENOUS at 23:51

## 2021-12-31 RX ADMIN — ASPIRIN 325 MG ORAL TABLET 325 MG: 325 PILL ORAL at 19:19

## 2022-01-01 ENCOUNTER — READMISSION MANAGEMENT (OUTPATIENT)
Dept: CALL CENTER | Facility: HOSPITAL | Age: 62
End: 2022-01-01

## 2022-01-01 ENCOUNTER — APPOINTMENT (OUTPATIENT)
Dept: NUCLEAR MEDICINE | Facility: HOSPITAL | Age: 62
End: 2022-01-01

## 2022-01-01 VITALS
RESPIRATION RATE: 16 BRPM | TEMPERATURE: 97.6 F | HEIGHT: 71 IN | SYSTOLIC BLOOD PRESSURE: 109 MMHG | OXYGEN SATURATION: 94 % | DIASTOLIC BLOOD PRESSURE: 70 MMHG | WEIGHT: 177.25 LBS | HEART RATE: 66 BPM | BODY MASS INDEX: 24.81 KG/M2

## 2022-01-01 LAB
ANION GAP SERPL CALCULATED.3IONS-SCNC: 9 MMOL/L (ref 5–15)
BH CV REST NUCLEAR ISOTOPE DOSE: 7.9 MCI
BH CV STRESS BP STAGE 1: NORMAL
BH CV STRESS BP STAGE 2: NORMAL
BH CV STRESS BP STAGE 3: NORMAL
BH CV STRESS DURATION MIN STAGE 1: 3
BH CV STRESS DURATION MIN STAGE 2: 3
BH CV STRESS DURATION MIN STAGE 3: 3
BH CV STRESS DURATION SEC STAGE 1: 0
BH CV STRESS DURATION SEC STAGE 2: 0
BH CV STRESS DURATION SEC STAGE 3: 0
BH CV STRESS GRADE STAGE 1: 10
BH CV STRESS GRADE STAGE 2: 12
BH CV STRESS GRADE STAGE 3: 14
BH CV STRESS HR STAGE 1: 85
BH CV STRESS HR STAGE 2: 113
BH CV STRESS HR STAGE 3: 142
BH CV STRESS METS STAGE 1: 5
BH CV STRESS METS STAGE 2: 7.5
BH CV STRESS METS STAGE 3: 10
BH CV STRESS NUCLEAR ISOTOPE DOSE: 21.9 MCI
BH CV STRESS PROTOCOL 1: NORMAL
BH CV STRESS RECOVERY BP: NORMAL MMHG
BH CV STRESS RECOVERY HR: 83 BPM
BH CV STRESS SPEED STAGE 1: 1.7
BH CV STRESS SPEED STAGE 2: 2.5
BH CV STRESS SPEED STAGE 3: 3.4
BH CV STRESS STAGE 1: 1
BH CV STRESS STAGE 2: 2
BH CV STRESS STAGE 3: 3
BUN SERPL-MCNC: 10 MG/DL (ref 8–23)
BUN/CREAT SERPL: 14.7 (ref 7–25)
CALCIUM SPEC-SCNC: 8.5 MG/DL (ref 8.6–10.5)
CHLORIDE SERPL-SCNC: 109 MMOL/L (ref 98–107)
CO2 SERPL-SCNC: 24 MMOL/L (ref 22–29)
CREAT SERPL-MCNC: 0.68 MG/DL (ref 0.76–1.27)
DEPRECATED RDW RBC AUTO: 38.9 FL (ref 37–54)
ERYTHROCYTE [DISTWIDTH] IN BLOOD BY AUTOMATED COUNT: 12.6 % (ref 12.3–15.4)
GFR SERPL CREATININE-BSD FRML MDRD: 119 ML/MIN/1.73
GLUCOSE BLDC GLUCOMTR-MCNC: 119 MG/DL (ref 70–105)
GLUCOSE SERPL-MCNC: 116 MG/DL (ref 65–99)
HCT VFR BLD AUTO: 39.6 % (ref 37.5–51)
HGB BLD-MCNC: 13.7 G/DL (ref 13–17.7)
LV EF NUC BP: 60 %
MAXIMAL PREDICTED HEART RATE: 160 BPM
MCH RBC QN AUTO: 31 PG (ref 26.6–33)
MCHC RBC AUTO-ENTMCNC: 34.5 G/DL (ref 31.5–35.7)
MCV RBC AUTO: 89.8 FL (ref 79–97)
PERCENT MAX PREDICTED HR: 88.75 %
PLATELET # BLD AUTO: 180 10*3/MM3 (ref 140–450)
PMV BLD AUTO: 9.8 FL (ref 6–12)
POTASSIUM SERPL-SCNC: 4.2 MMOL/L (ref 3.5–5.2)
RBC # BLD AUTO: 4.41 10*6/MM3 (ref 4.14–5.8)
SODIUM SERPL-SCNC: 142 MMOL/L (ref 136–145)
STRESS BASELINE BP: NORMAL MMHG
STRESS BASELINE HR: 68 BPM
STRESS PERCENT HR: 104 %
STRESS POST PEAK BP: NORMAL MMHG
STRESS POST PEAK HR: 142 BPM
STRESS TARGET HR: 136 BPM
TROPONIN T SERPL-MCNC: <0.01 NG/ML (ref 0–0.03)
WBC NRBC COR # BLD: 8.3 10*3/MM3 (ref 3.4–10.8)

## 2022-01-01 PROCEDURE — 78452 HT MUSCLE IMAGE SPECT MULT: CPT | Performed by: INTERNAL MEDICINE

## 2022-01-01 PROCEDURE — 93016 CV STRESS TEST SUPVJ ONLY: CPT | Performed by: INTERNAL MEDICINE

## 2022-01-01 PROCEDURE — 82962 GLUCOSE BLOOD TEST: CPT

## 2022-01-01 PROCEDURE — 84484 ASSAY OF TROPONIN QUANT: CPT

## 2022-01-01 PROCEDURE — G0378 HOSPITAL OBSERVATION PER HR: HCPCS

## 2022-01-01 PROCEDURE — A9502 TC99M TETROFOSMIN: HCPCS | Performed by: EMERGENCY MEDICINE

## 2022-01-01 PROCEDURE — 93010 ELECTROCARDIOGRAM REPORT: CPT | Performed by: INTERNAL MEDICINE

## 2022-01-01 PROCEDURE — 93005 ELECTROCARDIOGRAM TRACING: CPT

## 2022-01-01 PROCEDURE — 78452 HT MUSCLE IMAGE SPECT MULT: CPT

## 2022-01-01 PROCEDURE — 93017 CV STRESS TEST TRACING ONLY: CPT

## 2022-01-01 PROCEDURE — 85027 COMPLETE CBC AUTOMATED: CPT

## 2022-01-01 PROCEDURE — 0 TECHNETIUM TETROFOSMIN KIT: Performed by: EMERGENCY MEDICINE

## 2022-01-01 PROCEDURE — 80048 BASIC METABOLIC PNL TOTAL CA: CPT

## 2022-01-01 PROCEDURE — 93018 CV STRESS TEST I&R ONLY: CPT | Performed by: INTERNAL MEDICINE

## 2022-01-01 RX ORDER — LEVOTHYROXINE SODIUM 0.1 MG/1
100 TABLET ORAL
Status: DISCONTINUED | OUTPATIENT
Start: 2022-01-01 | End: 2022-01-01 | Stop reason: HOSPADM

## 2022-01-01 RX ORDER — INSULIN LISPRO 100 [IU]/ML
0-9 INJECTION, SOLUTION INTRAVENOUS; SUBCUTANEOUS AS NEEDED
Status: DISCONTINUED | OUTPATIENT
Start: 2022-01-01 | End: 2022-01-01 | Stop reason: HOSPADM

## 2022-01-01 RX ORDER — DEXTROSE MONOHYDRATE 25 G/50ML
25 INJECTION, SOLUTION INTRAVENOUS
Status: DISCONTINUED | OUTPATIENT
Start: 2022-01-01 | End: 2022-01-01 | Stop reason: HOSPADM

## 2022-01-01 RX ORDER — TRAMADOL HYDROCHLORIDE 50 MG/1
50 TABLET ORAL EVERY 6 HOURS PRN
Qty: 12 TABLET | Refills: 0 | Status: SHIPPED | OUTPATIENT
Start: 2022-01-01 | End: 2022-01-04

## 2022-01-01 RX ORDER — INSULIN LISPRO 100 [IU]/ML
0-9 INJECTION, SOLUTION INTRAVENOUS; SUBCUTANEOUS
Status: DISCONTINUED | OUTPATIENT
Start: 2022-01-01 | End: 2022-01-01 | Stop reason: HOSPADM

## 2022-01-01 RX ORDER — NICOTINE POLACRILEX 4 MG
15 LOZENGE BUCCAL
Status: DISCONTINUED | OUTPATIENT
Start: 2022-01-01 | End: 2022-01-01 | Stop reason: HOSPADM

## 2022-01-01 RX ORDER — ATORVASTATIN CALCIUM 20 MG/1
20 TABLET, FILM COATED ORAL NIGHTLY
Status: DISCONTINUED | OUTPATIENT
Start: 2022-01-01 | End: 2022-01-01 | Stop reason: HOSPADM

## 2022-01-01 RX ORDER — TRAMADOL HYDROCHLORIDE 50 MG/1
50 TABLET ORAL EVERY 6 HOURS PRN
Status: DISCONTINUED | OUTPATIENT
Start: 2022-01-01 | End: 2022-01-01 | Stop reason: HOSPADM

## 2022-01-01 RX ORDER — OLANZAPINE 10 MG/2ML
1 INJECTION, POWDER, LYOPHILIZED, FOR SOLUTION INTRAMUSCULAR
Status: DISCONTINUED | OUTPATIENT
Start: 2022-01-01 | End: 2022-01-01 | Stop reason: HOSPADM

## 2022-01-01 RX ADMIN — LEVOTHYROXINE SODIUM 100 MCG: 0.1 TABLET ORAL at 11:13

## 2022-01-01 RX ADMIN — TETROFOSMIN 1 DOSE: 1.38 INJECTION, POWDER, LYOPHILIZED, FOR SOLUTION INTRAVENOUS at 10:20

## 2022-01-01 RX ADMIN — TETROFOSMIN 1 DOSE: 1.38 INJECTION, POWDER, LYOPHILIZED, FOR SOLUTION INTRAVENOUS at 07:39

## 2022-01-01 RX ADMIN — SODIUM CHLORIDE, PRESERVATIVE FREE 10 ML: 5 INJECTION INTRAVENOUS at 11:13

## 2022-01-01 RX ADMIN — TRAMADOL HYDROCHLORIDE 50 MG: 50 TABLET, COATED ORAL at 15:07

## 2022-01-01 NOTE — DISCHARGE SUMMARY
[facility]    Lj Wong MD    CHIEF COMPLAINT:     Shoulder and chest pain    HISTORY OF PRESENT ILLNESS:    Obtained from admitting provider HPI on 12/31/2021:  Patient is a 61-year-old male who presents to the ER today complaining of intermittent left shoulder pain that radiates down into his arm and chest.  He states that the pain is exacerbated with laying flat and is improved with activity.  He has had no shortness of breath no nausea vomiting or diaphoresis associated with the discomfort.  He has not had a stress test done in the last 11 years.  Patient does have a history of high cholesterol and type 2 diabetes.  He does not take aspirin on a daily basis.    1/1/2022: Patient emergency HPI noted above including approximately 2 to 3-week history of worsening pain in his left shoulder that has begun to radiate down his left arm including some numbness and tingling as well as into the left side of his chest.  Pain is described as a deep pain with occasional cramping rated at 10/10 at its worst.  Patient denies any dyspnea, cough beyond his chronic baseline cough, nausea, vomiting, fever or sensation of tachycardia or palpitations.  Patient does not smoke or drink alcohol.  He reports a significant family history of CAD in all of his grandparents, parents as well as siblings.        Past Medical History:   Diagnosis Date   • Chronic pain of left knee 1/20/2021   • Hiatal hernia 7/28/2011 07/28/2011--3 cm hiatal hernia, otherwise normal EGD.   • History of coronary angiogram 2010 2010-- patient had a heart catheterization which was normal.   • History of nephrolithiasis Approximately 1985    Approximately 1985--patient passed a kidney stone stone spontaneously.   • History of peptic ulcer Approximately 1979    Approximately 1979--patient was told he had a peptic ulcer and was placed on Mylanta.  It sounds as if he had an EGD.   • Hyperlipidemia 6/22/2012     2012--treatment for hyperlipidemia begun.   • Hypothyroidism 2012--treatment for hypothyroidism begun.  2012--ultrasound of the thyroid revealed increased hypervascularity, slightly heterogeneous echo.   2012--initial diagnosis of hypothyroidism.   • Microalbuminuria 5/15/2015    05/15/2015--urine microalbumin slightly elevated at 19.4. Normal range 0.0--17.0.   • Vitamin D deficiency 3/21/2016     Past Surgical History:   Procedure Laterality Date   • COLONOSCOPY  2011--normal colonoscopy with an excellent prep.   • ESOPHAGOSCOPY / EGD  2011--3 cm hiatal hernia, otherwise normal EGD.   • TONSILLECTOMY      11 years old.     Family History   Problem Relation Age of Onset   • Coronary artery disease Mother    • Diabetes Mother    • Hypertension Mother         Essential   • Hyperlipidemia Mother    • Diabetes Father    • Hypertension Father         Essential   • Hyperlipidemia Father    • Coronary artery disease Brother    • Diabetes Brother    • Hypertension Brother         Essential   • Hyperlipidemia Brother      Social History     Tobacco Use   • Smoking status: Former Smoker     Packs/day: 3.00     Years: 8.00     Pack years: 24.00     Types: Cigars     Start date: 2013     Quit date: 2021     Years since quittin.0   • Smokeless tobacco: Never Used   Vaping Use   • Vaping Use: Never used   Substance Use Topics   • Alcohol use: Yes     Alcohol/week: 2.0 - 3.0 standard drinks     Types: 2 - 3 Shots of liquor per week     Comment: Moderately   • Drug use: No     Medications Prior to Admission   Medication Sig Dispense Refill Last Dose   • Ascorbic Acid (VITAMIN C PO) Take  by mouth.   2021 at 0900   • atorvastatin (LIPITOR) 20 MG tablet Take 1 p.o. daily for high cholesterol 30 tablet 11 2021 at 0900   • Cholecalciferol (VITAMIN D3) 5000 UNITS capsule capsule Take  by mouth.   2021 at 0900   • ezetimibe  (ZETIA) 10 MG tablet Take 1 p.o. daily for high cholesterol 30 tablet 11 12/31/2021 at 0900   • levothyroxine (SYNTHROID, LEVOTHROID) 100 MCG tablet TAKE 1 TABLET BY MOUTH DAILY FOR THYROID 90 tablet 3 12/31/2021 at 0900   • multivitamin (THERAGRAN) tablet tablet Take 1 tablet by mouth daily.   12/31/2021 at 0900   • SITagliptin-metFORMIN HCl ER (Janumet XR) 100-1000 MG tablet Take 1 p.o. daily with food for diabetes 30 tablet 11 12/31/2021 at 0900   • meloxicam (MOBIC) 15 MG tablet Take 1 p.o. daily for arthritis of the knees 30 tablet 6      Allergies:  Patient has no known allergies.    Immunization History   Administered Date(s) Administered   • COVID-19 (MODERNA) 1st, 2nd, 3rd Dose Only 12/06/2021   • FluLaval/Fluarix/Fluzone >6 02/22/2020   • Pneumococcal Polysaccharide (PPSV23) 05/09/2018   • Tdap 05/08/2015, 05/09/2018           REVIEW OF SYSTEMS:    Review of Systems   Constitutional: Negative.   HENT: Negative.    Eyes: Negative.    Cardiovascular: Positive for chest pain. Negative for dyspnea on exertion, irregular heartbeat, leg swelling, near-syncope, orthopnea, palpitations and syncope.   Respiratory: Positive for cough. Negative for shortness of breath and sputum production.    Endocrine: Negative.    Skin: Negative.    Musculoskeletal: Positive for joint pain.   Gastrointestinal: Negative.    Genitourinary: Negative.    Neurological: Negative.    Psychiatric/Behavioral: Negative.        Vital Signs  Temp:  [97.6 °F (36.4 °C)-98.3 °F (36.8 °C)] 97.6 °F (36.4 °C)  Heart Rate:  [] 66  Resp:  [16-18] 16  BP: (109-143)/(69-98) 109/70          Physical Exam:  Physical Exam  Vitals reviewed.   Constitutional:       General: He is not in acute distress.     Appearance: Normal appearance. He is normal weight. He is not ill-appearing, toxic-appearing or diaphoretic.   HENT:      Head: Normocephalic and atraumatic.      Right Ear: External ear normal.      Left Ear: External ear normal.      Nose: Nose  normal.      Mouth/Throat:      Mouth: Mucous membranes are moist.   Eyes:      Extraocular Movements: Extraocular movements intact.   Cardiovascular:      Rate and Rhythm: Regular rhythm. Bradycardia present.      Pulses: Normal pulses.      Heart sounds: Normal heart sounds.   Pulmonary:      Effort: Pulmonary effort is normal.      Breath sounds: Normal breath sounds.   Abdominal:      General: Bowel sounds are normal. There is no distension.      Palpations: Abdomen is soft.      Tenderness: There is no abdominal tenderness.   Musculoskeletal:         General: Normal range of motion.      Cervical back: Normal range of motion.   Skin:     General: Skin is warm and dry.      Capillary Refill: Capillary refill takes less than 2 seconds.   Neurological:      General: No focal deficit present.      Mental Status: He is alert and oriented to person, place, and time.   Psychiatric:         Mood and Affect: Mood normal.         Behavior: Behavior normal.         Thought Content: Thought content normal.         Judgment: Judgment normal.         Emotional Behavior:   Normal   Debilities:  None  Results Review:    I reviewed the patient's new clinical results.  Lab Results (most recent)     Procedure Component Value Units Date/Time    POC Glucose Once [[id number]]  (Abnormal) Collected: 01/01/22 1129    Specimen: Blood Updated: 01/01/22 1130     Glucose 119 mg/dL      Comment: Serial Number: [id number]:  [id number]       Troponin [[id number]]  (Normal) Collected: 01/01/22 0559    Specimen: Blood Updated: 01/01/22 0652     Troponin T <0.010 ng/mL     Narrative:      Troponin T Reference Range:  <= 0.03 ng/mL-   Negative for AMI  >0.03 ng/mL-     Abnormal for myocardial necrosis.  Clinicians would have to utilize clinical acumen, EKG, Troponin and serial changes to determine if it is an Acute Myocardial Infarction or myocardial injury due to an underlying chronic condition.       Results may be falsely decreased if  patient taking Biotin.      Basic Metabolic Panel [[id number]]  (Abnormal) Collected: 01/01/22 0559    Specimen: Blood Updated: 01/01/22 0648     Glucose 116 mg/dL      BUN 10 mg/dL      Creatinine 0.68 mg/dL      Sodium 142 mmol/L      Potassium 4.2 mmol/L      Chloride 109 mmol/L      CO2 24.0 mmol/L      Calcium 8.5 mg/dL      eGFR Non African Amer 119 mL/min/1.73      BUN/Creatinine Ratio 14.7     Anion Gap 9.0 mmol/L     Narrative:      GFR Normal >60  Chronic Kidney Disease <60  Kidney Failure <15      CBC (No Diff) [[id number]]  (Normal) Collected: 01/01/22 0559    Specimen: Blood Updated: 01/01/22 0643     WBC 8.30 10*3/mm3      RBC 4.41 10*6/mm3      Hemoglobin 13.7 g/dL      Hematocrit 39.6 %      MCV 89.8 fL      MCH 31.0 pg      MCHC 34.5 g/dL      RDW 12.6 %      RDW-SD 38.9 fl      MPV 9.8 fL      Platelets 180 10*3/mm3     COVID PRE-OP / PRE-PROCEDURE SCREENING ORDER (NO ISOLATION) - Swab, Nasopharynx [[id number]]  (Normal) Collected: 12/31/21 2150    Specimen: Swab from Nasopharynx Updated: 12/31/21 2229    Narrative:      The following orders were created for panel order COVID PRE-OP / PRE-PROCEDURE SCREENING ORDER (NO ISOLATION) - Swab, Nasopharynx.  Procedure                               Abnormality         Status                     ---------                               -----------         ------                     COVID-19,CEPHEID/MEGAN,CO...[[id number]]  Normal              Final result                 Please view results for these tests on the individual orders.    COVID-19,CEPHEID/MEGAN,COR/[facility]/PAD/[facility] IN-HOUSE(OR EMERGENT/ADD-ON),NP SWAB IN TRANSPORT MEDIA 3-4 HR TAT, RT-PCR - Swab, Nasopharynx [[id number]]  (Normal) Collected: 12/31/21 2150    Specimen: Swab from Nasopharynx Updated: 12/31/21 2229     COVID19 Not Detected    Narrative:      Fact sheet for providers: https://www.fda.gov/media/115732/download     Fact sheet for patients: https://www.fda.gov/media/300319/download  Fact sheet for  providers: https://www.fda.gov/media/602070/download     Fact sheet for patients: https://www.fda.gov/media/395398/download    Troponin [[id number]]  (Normal) Collected: 12/31/21 2107    Specimen: Blood Updated: 12/31/21 2216     Troponin T <0.010 ng/mL     Narrative:      Troponin T Reference Range:  <= 0.03 ng/mL-   Negative for AMI  >0.03 ng/mL-     Abnormal for myocardial necrosis.  Clinicians would have to utilize clinical acumen, EKG, Troponin and serial changes to determine if it is an Acute Myocardial Infarction or myocardial injury due to an underlying chronic condition.       Results may be falsely decreased if patient taking Biotin.      Extra Tubes [[id number]] Collected: 12/31/21 1912    Specimen: Blood, Venous Line Updated: 12/31/21 2015    Narrative:      The following orders were created for panel order Extra Tubes.  Procedure                               Abnormality         Status                     ---------                               -----------         ------                     Gold Top - SST[[id number]]                                   Final result                 Please view results for these tests on the individual orders.    Gold Top - SST [[id number]] Collected: 12/31/21 1912    Specimen: Blood Updated: 12/31/21 2015     Extra Tube Hold for add-ons.     Comment: Auto resulted.       aPTT [[id number]]  (Normal) Collected: 12/31/21 1912    Specimen: Blood Updated: 12/31/21 1951     PTT 26.5 seconds     Protime-INR [[id number]]  (Normal) Collected: 12/31/21 1912    Specimen: Blood Updated: 12/31/21 1951     Protime 10.8 Seconds      INR 0.97    Basic Metabolic Panel [[id number]]  (Abnormal) Collected: 12/31/21 1912    Specimen: Blood Updated: 12/31/21 1949     Glucose 104 mg/dL      BUN 11 mg/dL      Creatinine 0.71 mg/dL      Sodium 141 mmol/L      Potassium 3.9 mmol/L      Chloride 105 mmol/L      CO2 27.0 mmol/L      Calcium 9.1 mg/dL      eGFR Non African Amer 113 mL/min/1.73       BUN/Creatinine Ratio 15.5     Anion Gap 9.0 mmol/L     Narrative:      GFR Normal >60  Chronic Kidney Disease <60  Kidney Failure <15      BNP [[id number]]  (Normal) Collected: 12/31/21 1912    Specimen: Blood Updated: 12/31/21 1946     proBNP 173.6 pg/mL     Narrative:      Among patients with dyspnea, NT-proBNP is highly sensitive for the detection of acute congestive heart failure. In addition NT-proBNP of <300 pg/ml effectively rules out acute congestive heart failure with 99% negative predictive value.    Results may be falsely decreased if patient taking Biotin.      CBC & Differential [[id number]]  (Abnormal) Collected: 12/31/21 1912    Specimen: Blood Updated: 12/31/21 1918    Narrative:      The following orders were created for panel order CBC & Differential.  Procedure                               Abnormality         Status                     ---------                               -----------         ------                     CBC Auto Differential[[id number]]        Abnormal            Final result                 Please view results for these tests on the individual orders.    CBC Auto Differential [[id number]]  (Abnormal) Collected: 12/31/21 1912    Specimen: Blood Updated: 12/31/21 1918     WBC 8.10 10*3/mm3      RBC 4.51 10*6/mm3      Hemoglobin 13.4 g/dL      Hematocrit 40.6 %      MCV 90.1 fL      MCH 29.6 pg      MCHC 32.9 g/dL      RDW 12.8 %      RDW-SD 39.8 fl      MPV 9.3 fL      Platelets 205 10*3/mm3      Neutrophil % 56.9 %      Lymphocyte % 29.7 %      Monocyte % 7.3 %      Eosinophil % 4.4 %      Basophil % 1.7 %      Neutrophils, Absolute 4.60 10*3/mm3      Lymphocytes, Absolute 2.40 10*3/mm3      Monocytes, Absolute 0.60 10*3/mm3      Eosinophils, Absolute 0.40 10*3/mm3      Basophils, Absolute 0.10 10*3/mm3      nRBC 0.0 /100 WBC           Imaging Results (Most Recent)     Procedure Component Value Units Date/Time    XR Chest 1 View [[id number]] Collected: 12/31/21 1928     Updated:  12/31/21 1931    Narrative:      DATE OF EXAM:  12/31/2021 7:11 PM     PROCEDURE:  XR CHEST 1 VW-     INDICATIONS:  chest pain today. History smoking. Shortness of air today. Chest pain  for the past 3 days     COMPARISON:  No Comparisons Available     TECHNIQUE:   Single radiographic AP view of the chest was obtained.     FINDINGS:  Single frontal view chest reveals heart and mediastinum are normal. No  evidence of infiltrate or effusion or pneumothorax or mass right or left  lungs        Impression:         1. Single frontal view chest is normal     Electronically Signed By-Burt Ac MD On:12/31/2021 7:29 PM  This report was finalized on [id number] by  Burt Ac MD.        reviewed    ECG/EMG Results (most recent)     Procedure Component Value Units Date/Time    ECG 12 Lead [[id number]] Collected: 12/31/21 1648     Updated: 12/31/21 1812     QT Interval 385 ms     Narrative:      HEART RATE= 65  bpm  RR Interval= 924  ms  OK Interval= 166  ms  P Horizontal Axis= 19  deg  P Front Axis= 44  deg  QRSD Interval= 112  ms  QT Interval= 385  ms  QRS Axis= 40  deg  T Wave Axis= 39  deg  - ABNORMAL ECG -  Sinus rhythm  Incomplete right bundle branch block  Low voltage, precordial leads  No previous ECG available for comparison  Electronically Signed By:   Date and Time of Study: 2021-12-31 16:48:08    ECG 12 Lead [[id number]] Collected: 01/01/22 0605     Updated: 01/01/22 0606     QT Interval 415 ms     Narrative:      HEART RATE= 54  bpm  RR Interval= 1108  ms  OK Interval= 176  ms  P Horizontal Axis= 11  deg  P Front Axis= 53  deg  QRSD Interval= 110  ms  QT Interval= 415  ms  QRS Axis= -43  deg  T Wave Axis= 48  deg  - OTHERWISE NORMAL ECG -  Sinus bradycardia  Left axis deviation  Electronically Signed By:   Date and Time of Study: 2022-01-01 06:05:17        reviewed            Microbiology Results (last 10 days)     Procedure Component Value - Date/Time    COVID PRE-OP / PRE-PROCEDURE SCREENING  ORDER (NO ISOLATION) - Swab, Nasopharynx [[id number]]  (Normal) Collected: 12/31/21 2150    Lab Status: Final result Specimen: Swab from Nasopharynx Updated: 12/31/21 2229    Narrative:      The following orders were created for panel order COVID PRE-OP / PRE-PROCEDURE SCREENING ORDER (NO ISOLATION) - Swab, Nasopharynx.  Procedure                               Abnormality         Status                     ---------                               -----------         ------                     COVID-19,CEPHEID/MEGAN,CO...[[id number]]  Normal              Final result                 Please view results for these tests on the individual orders.    COVID-19,CEPHEID/MEGAN,COR/[facility]/PAD/[facility] IN-HOUSE(OR EMERGENT/ADD-ON),NP SWAB IN TRANSPORT MEDIA 3-4 HR TAT, RT-PCR - Swab, Nasopharynx [[id number]]  (Normal) Collected: 12/31/21 2150    Lab Status: Final result Specimen: Swab from Nasopharynx Updated: 12/31/21 2229     COVID19 Not Detected    Narrative:      Fact sheet for providers: https://www.fda.gov/media/069957/download     Fact sheet for patients: https://www.fda.gov/media/644028/download  Fact sheet for providers: https://www.fda.gov/media/555438/download     Fact sheet for patients: https://www.fda.gov/media/716663/download          Assessment/Plan     Chest pain       Chest pain  -Serial troponin: Less than 0.010  -proBNP: 173.6  -Chest x-ray noted is normal  -EKG showed sinus bradycardia at 54 without obvious acute ST change and an incomplete right bundle branch block with a QTC of 394 ms  -In the ED pt given 325 mg aspirin  -Stress Test showed normal left ventricular ejection fraction and normal myocardial perfusion study with no evidence of ischemia and a normal ECG stress test consistent with a low risk study  -Telemetry  -NPO      I discussed the patients findings and my recommendations with patient and nursing staff.     Discharge Diagnosis:      Chest pain      Hospital Course  Patient is a 61 y.o. male presented  with chest and shoulder pain with an HPI noted above.  Serial troponins remain less than 0.010 and proBNP was found to be 173.6.  Chest x-ray was reported as normal and EKG showed sinus bradycardia at 54 without obvious acute ST changes though an incomplete right bundle branch block is present with a QTC of 394 ms.  He was given 325 mg aspirin in the ED and continued on telemetry throughout his admission with persistent bradycardia in the 50s noted but no associated symptoms reported.  Stress testing was performed on 1/1/2021 which is noted is consistent with a low risk study.  At this time patient is felt to be in good condition for discharge with close follow-up with his PCP and cardiology on an outpatient basis.  A short course of Ultram Will be prescribed at discharge.  His full testing/results and plan were discussed with patient along with concerning/alarm symptoms for which to call 911/return to the ED.  All questions were answered he verbalizes his understanding and agreement.    Past Medical History:     Past Medical History:   Diagnosis Date   • Chronic pain of left knee 1/20/2021   • Hiatal hernia 7/28/2011 07/28/2011--3 cm hiatal hernia, otherwise normal EGD.   • History of coronary angiogram 2010 2010-- patient had a heart catheterization which was normal.   • History of nephrolithiasis Approximately 1985    Approximately 1985--patient passed a kidney stone stone spontaneously.   • History of peptic ulcer Approximately 1979    Approximately 1979--patient was told he had a peptic ulcer and was placed on Mylanta.  It sounds as if he had an EGD.   • Hyperlipidemia 6/22/2012 06/22/2012--treatment for hyperlipidemia begun.   • Hypothyroidism 4/27/2012 05/04/2012--treatment for hypothyroidism begun.  05/01/2012--ultrasound of the thyroid revealed increased hypervascularity, slightly heterogeneous echo.   04/27/2012--initial diagnosis of hypothyroidism.   • Microalbuminuria 5/15/2015     05/15/2015--urine microalbumin slightly elevated at 19.4. Normal range 0.0--17.0.   • Vitamin D deficiency 3/21/2016       Past Surgical History:     Past Surgical History:   Procedure Laterality Date   • COLONOSCOPY  2011--normal colonoscopy with an excellent prep.   • ESOPHAGOSCOPY / EGD  2011--3 cm hiatal hernia, otherwise normal EGD.   • TONSILLECTOMY      11 years old.       Social History:   Social History     Socioeconomic History   • Marital status:    Tobacco Use   • Smoking status: Former Smoker     Packs/day: 3.00     Years: 8.00     Pack years: 24.00     Types: Cigars     Start date: 2013     Quit date: 2021     Years since quittin.0   • Smokeless tobacco: Never Used   Vaping Use   • Vaping Use: Never used   Substance and Sexual Activity   • Alcohol use: Yes     Alcohol/week: 2.0 - 3.0 standard drinks     Types: 2 - 3 Shots of liquor per week     Comment: Moderately   • Drug use: No   • Sexual activity: Yes     Partners: Female       Procedures Performed         Consults:   Consults     No orders found for last 30 day(s).          Condition on Discharge:     Stable    Discharge Disposition  Home or Self Care    Discharge Medications     Discharge Medications      New Medications      Instructions Start Date   traMADol 50 MG tablet  Commonly known as: ULTRAM   50 mg, Oral, Every 6 Hours PRN         Continue These Medications      Instructions Start Date   atorvastatin 20 MG tablet  Commonly known as: LIPITOR   Take 1 p.o. daily for high cholesterol      ezetimibe 10 MG tablet  Commonly known as: ZETIA   Take 1 p.o. daily for high cholesterol      Janumet -1000 MG tablet  Generic drug: SITagliptin-metFORMIN HCl ER   Take 1 p.o. daily with food for diabetes      levothyroxine 100 MCG tablet  Commonly known as: SYNTHROID, LEVOTHROID   TAKE 1 TABLET BY MOUTH DAILY FOR THYROID      meloxicam 15 MG tablet  Commonly known as: MOBIC   Take 1  p.o. daily for arthritis of the knees      multivitamin tablet tablet   1 tablet, Oral, Daily      VITAMIN C PO   Oral      vitamin D3 125 MCG (5000 UT) capsule capsule   Oral             Discharge Diet:     Activity at Discharge:   Activity Instructions     Driving Restrictions      Type of Restriction: Driving    Driving Restrictions: No Driving While Taking Narcotics          Follow-up Appointments  Future Appointments   Date Time Provider Department Center   6/15/2022  3:30 PM Lj Wong MD [facility]     Additional Instructions for the Follow-ups that You Need to Schedule     Discharge Follow-up with PCP   As directed       Currently Documented PCP:    Lj Wong MD    PCP Phone Number:    [phone]     Follow Up Details: 5 to 7 days               Test Results Pending at Discharge       Risk for Readmission (LACE) Score: 4 (1/1/2022  6:02 AM)          Danilo Leyva PA-C  01/01/22  13:28 EST

## 2022-01-01 NOTE — OUTREACH NOTE
Prep Survey      Responses   [facility] patient discharged from? [facility]   Is LACE score < 7 ? Yes   Emergency Room discharge w/ pulse ox? No   Eligibility [facility]   Date of Admission 12/31/21   Date of Discharge 01/01/22   Discharge Disposition Home or Self Care   Discharge diagnosis Chest pain   Does the patient have one of the following disease processes/diagnoses(primary or secondary)? Other   Does the patient have Home health ordered? No   Is there a DME ordered? No   Prep survey completed? Yes          Felecia Marie RN

## 2022-01-01 NOTE — PLAN OF CARE
Goal Outcome Evaluation:  Plan of Care Reviewed With: patient        Progress: no change  Outcome Summary: Pt was admitted for chest pain radiating to left shoulder for about 2-3 weeks. VSS. RA. NPO at midnight for possible myoview. Pt is ambulating and hasn't c/o anything. Will continue to monitor.

## 2022-01-01 NOTE — PLAN OF CARE
Goal Outcome Evaluation:  Plan of Care Reviewed With: patient        Progress: improving  Outcome Summary: No c/o of chest pain this morning. Scheduled for myoview.  Will continue to monitor.

## 2022-01-02 LAB — QT INTERVAL: 385 MS

## 2022-01-03 ENCOUNTER — TRANSITIONAL CARE MANAGEMENT TELEPHONE ENCOUNTER (OUTPATIENT)
Dept: CALL CENTER | Facility: HOSPITAL | Age: 62
End: 2022-01-03

## 2022-01-03 LAB — QT INTERVAL: 415 MS

## 2022-01-03 NOTE — CASE MANAGEMENT/SOCIAL WORK
Case Management Discharge Note      Final Note: home, d/c prior to cm assessment           Final Discharge Disposition Code: 01 - home or self-care

## 2022-01-03 NOTE — OUTREACH NOTE
Call Center TCM Note      Responses   [facility] patient discharged from? [facility]   Does the patient have one of the following disease processes/diagnoses(primary or secondary)? Other   TCM attempt successful? No   Unsuccessful attempts Attempt 1          Savana Leach MA    1/3/2022, 15:14 EST

## 2022-01-03 NOTE — OUTREACH NOTE
Call Center TCM Note      Responses   [facility] patient discharged from? [facility]   Does the patient have one of the following disease processes/diagnoses(primary or secondary)? Other   TCM attempt successful? No   Unsuccessful attempts Attempt 2          Savana Leach MA    1/3/2022, 16:38 EST

## 2022-01-04 ENCOUNTER — TRANSITIONAL CARE MANAGEMENT TELEPHONE ENCOUNTER (OUTPATIENT)
Dept: CALL CENTER | Facility: HOSPITAL | Age: 62
End: 2022-01-04

## 2022-01-04 NOTE — OUTREACH NOTE
Call Center TCM Note      Responses   [facility] patient discharged from? [facility]   Does the patient have one of the following disease processes/diagnoses(primary or secondary)? Other   TCM attempt successful? No   Unsuccessful attempts Attempt 3  [verbal release out of date]          Kallie Pineda RN    1/4/2022, 11:01 EST

## 2022-01-22 DIAGNOSIS — E78.2 MIXED HYPERLIPIDEMIA: Chronic | ICD-10-CM

## 2022-01-23 DIAGNOSIS — R80.9 TYPE 2 DIABETES MELLITUS WITH MICROALBUMINURIA, WITHOUT LONG-TERM CURRENT USE OF INSULIN: Chronic | ICD-10-CM

## 2022-01-23 DIAGNOSIS — E11.29 TYPE 2 DIABETES MELLITUS WITH MICROALBUMINURIA, WITHOUT LONG-TERM CURRENT USE OF INSULIN: Chronic | ICD-10-CM

## 2022-01-24 RX ORDER — EZETIMIBE 10 MG/1
TABLET ORAL
Qty: 30 TABLET | Refills: 11 | Status: SHIPPED | OUTPATIENT
Start: 2022-01-24 | End: 2022-01-27

## 2022-01-24 RX ORDER — ATORVASTATIN CALCIUM 20 MG/1
TABLET, FILM COATED ORAL
Qty: 30 TABLET | Refills: 11 | Status: SHIPPED | OUTPATIENT
Start: 2022-01-24 | End: 2023-02-14

## 2022-01-24 RX ORDER — SITAGLIPTIN AND METFORMIN HYDROCHLORIDE 1000; 100 MG/1; MG/1
TABLET, FILM COATED, EXTENDED RELEASE ORAL
Qty: 30 TABLET | Refills: 11 | Status: SHIPPED | OUTPATIENT
Start: 2022-01-24

## 2022-01-26 ENCOUNTER — TRANSCRIBE ORDERS (OUTPATIENT)
Dept: ADMINISTRATIVE | Facility: HOSPITAL | Age: 62
End: 2022-01-26

## 2022-01-26 ENCOUNTER — HOSPITAL ENCOUNTER (OUTPATIENT)
Dept: CARDIOLOGY | Facility: HOSPITAL | Age: 62
Discharge: HOME | End: 2022-01-26

## 2022-01-26 ENCOUNTER — LAB (OUTPATIENT)
Dept: LAB | Facility: HOSPITAL | Age: 62
End: 2022-01-26

## 2022-01-26 ENCOUNTER — HOSPITAL ENCOUNTER (OUTPATIENT)
Dept: GENERAL RADIOLOGY | Facility: HOSPITAL | Age: 62
Discharge: HOME | End: 2022-01-26

## 2022-01-26 DIAGNOSIS — Z01.818 PRE-OP TESTING: ICD-10-CM

## 2022-01-26 DIAGNOSIS — Z01.818 PRE-OP TESTING: Primary | ICD-10-CM

## 2022-01-26 LAB
ALBUMIN SERPL-MCNC: 4.1 G/DL (ref 3.5–5.2)
ALBUMIN/GLOB SERPL: 1.6 G/DL
ALP SERPL-CCNC: 62 U/L (ref 39–117)
ALT SERPL W P-5'-P-CCNC: 41 U/L (ref 1–41)
ANION GAP SERPL CALCULATED.3IONS-SCNC: 6.8 MMOL/L (ref 5–15)
AST SERPL-CCNC: 28 U/L (ref 1–40)
BILIRUB SERPL-MCNC: 0.2 MG/DL (ref 0–1.2)
BUN SERPL-MCNC: 12 MG/DL (ref 8–23)
BUN/CREAT SERPL: 15.2 (ref 7–25)
CALCIUM SPEC-SCNC: 9.9 MG/DL (ref 8.6–10.5)
CHLORIDE SERPL-SCNC: 104 MMOL/L (ref 98–107)
CO2 SERPL-SCNC: 29.2 MMOL/L (ref 22–29)
CREAT SERPL-MCNC: 0.79 MG/DL (ref 0.76–1.27)
DEPRECATED RDW RBC AUTO: 41.5 FL (ref 37–54)
ERYTHROCYTE [DISTWIDTH] IN BLOOD BY AUTOMATED COUNT: 12.5 % (ref 12.3–15.4)
GFR SERPL CREATININE-BSD FRML MDRD: 100 ML/MIN/1.73
GLOBULIN UR ELPH-MCNC: 2.5 GM/DL
GLUCOSE SERPL-MCNC: 99 MG/DL (ref 65–99)
HCT VFR BLD AUTO: 48.7 % (ref 37.5–51)
HGB BLD-MCNC: 16.2 G/DL (ref 13–17.7)
MCH RBC QN AUTO: 30.5 PG (ref 26.6–33)
MCHC RBC AUTO-ENTMCNC: 33.3 G/DL (ref 31.5–35.7)
MCV RBC AUTO: 91.5 FL (ref 79–97)
PLATELET # BLD AUTO: 214 10*3/MM3 (ref 140–450)
PMV BLD AUTO: 11.6 FL (ref 6–12)
POTASSIUM SERPL-SCNC: 5.3 MMOL/L (ref 3.5–5.2)
PROT SERPL-MCNC: 6.6 G/DL (ref 6–8.5)
QT INTERVAL: 400 MS
RBC # BLD AUTO: 5.32 10*6/MM3 (ref 4.14–5.8)
SODIUM SERPL-SCNC: 140 MMOL/L (ref 136–145)
WBC NRBC COR # BLD: 10.49 10*3/MM3 (ref 3.4–10.8)

## 2022-01-26 PROCEDURE — 36415 COLL VENOUS BLD VENIPUNCTURE: CPT

## 2022-01-26 PROCEDURE — 93005 ELECTROCARDIOGRAM TRACING: CPT | Performed by: ORTHOPAEDIC SURGERY

## 2022-01-26 PROCEDURE — 71046 X-RAY EXAM CHEST 2 VIEWS: CPT

## 2022-01-26 PROCEDURE — 93010 ELECTROCARDIOGRAM REPORT: CPT | Performed by: INTERNAL MEDICINE

## 2022-01-26 PROCEDURE — 80053 COMPREHEN METABOLIC PANEL: CPT

## 2022-01-26 PROCEDURE — 85027 COMPLETE CBC AUTOMATED: CPT

## 2022-01-27 RX ORDER — EZETIMIBE 10 MG/1
TABLET ORAL
Qty: 30 TABLET | Refills: 11 | Status: SHIPPED | OUTPATIENT
Start: 2022-01-27 | End: 2023-02-13

## 2022-04-18 DIAGNOSIS — E03.9 PRIMARY HYPOTHYROIDISM: Chronic | ICD-10-CM

## 2022-04-18 RX ORDER — LEVOTHYROXINE SODIUM 0.1 MG/1
TABLET ORAL
Qty: 90 TABLET | Refills: 3 | Status: SHIPPED | OUTPATIENT
Start: 2022-04-18

## 2022-06-14 ENCOUNTER — LAB (OUTPATIENT)
Dept: LAB | Facility: HOSPITAL | Age: 62
End: 2022-06-14

## 2022-06-14 DIAGNOSIS — E78.2 MIXED HYPERLIPIDEMIA: Chronic | ICD-10-CM

## 2022-06-14 DIAGNOSIS — E11.29 TYPE 2 DIABETES MELLITUS WITH MICROALBUMINURIA, WITHOUT LONG-TERM CURRENT USE OF INSULIN: Chronic | ICD-10-CM

## 2022-06-14 DIAGNOSIS — E03.9 PRIMARY HYPOTHYROIDISM: Chronic | ICD-10-CM

## 2022-06-14 DIAGNOSIS — Z00.00 ROUTINE PHYSICAL EXAMINATION: ICD-10-CM

## 2022-06-14 DIAGNOSIS — Z51.81 THERAPEUTIC DRUG MONITORING: ICD-10-CM

## 2022-06-14 DIAGNOSIS — R80.9 TYPE 2 DIABETES MELLITUS WITH MICROALBUMINURIA, WITHOUT LONG-TERM CURRENT USE OF INSULIN: Chronic | ICD-10-CM

## 2022-06-14 DIAGNOSIS — E55.9 VITAMIN D DEFICIENCY: Chronic | ICD-10-CM

## 2022-06-14 LAB
25(OH)D3 SERPL-MCNC: 44.9 NG/ML (ref 30–100)
ALBUMIN SERPL-MCNC: 4.2 G/DL (ref 3.5–5.2)
ALBUMIN UR-MCNC: <1.2 MG/DL
ALBUMIN/GLOB SERPL: 1.9 G/DL
ALP SERPL-CCNC: 60 U/L (ref 39–117)
ALT SERPL W P-5'-P-CCNC: 37 U/L (ref 1–41)
ANION GAP SERPL CALCULATED.3IONS-SCNC: 6.7 MMOL/L (ref 5–15)
AST SERPL-CCNC: 29 U/L (ref 1–40)
BILIRUB SERPL-MCNC: 0.2 MG/DL (ref 0–1.2)
BILIRUB UR QL STRIP: NEGATIVE
BUN SERPL-MCNC: 10 MG/DL (ref 8–23)
BUN/CREAT SERPL: 12.7 (ref 7–25)
CALCIUM SPEC-SCNC: 9.7 MG/DL (ref 8.6–10.5)
CHLORIDE SERPL-SCNC: 103 MMOL/L (ref 98–107)
CK SERPL-CCNC: 102 U/L (ref 20–200)
CLARITY UR: CLEAR
CO2 SERPL-SCNC: 25.3 MMOL/L (ref 22–29)
COLOR UR: YELLOW
CREAT SERPL-MCNC: 0.79 MG/DL (ref 0.76–1.27)
CREAT UR-MCNC: 61.8 MG/DL
DEPRECATED RDW RBC AUTO: 42.8 FL (ref 37–54)
EGFRCR SERPLBLD CKD-EPI 2021: 101.1 ML/MIN/1.73
ERYTHROCYTE [DISTWIDTH] IN BLOOD BY AUTOMATED COUNT: 13 % (ref 12.3–15.4)
GLOBULIN UR ELPH-MCNC: 2.2 GM/DL
GLUCOSE SERPL-MCNC: 118 MG/DL (ref 65–99)
GLUCOSE UR STRIP-MCNC: NEGATIVE MG/DL
HBA1C MFR BLD: 6.5 % (ref 4.8–5.6)
HCT VFR BLD AUTO: 48.7 % (ref 37.5–51)
HGB BLD-MCNC: 16.3 G/DL (ref 13–17.7)
HGB UR QL STRIP.AUTO: NEGATIVE
KETONES UR QL STRIP: NEGATIVE
LEUKOCYTE ESTERASE UR QL STRIP.AUTO: NEGATIVE
MCH RBC QN AUTO: 30.2 PG (ref 26.6–33)
MCHC RBC AUTO-ENTMCNC: 33.5 G/DL (ref 31.5–35.7)
MCV RBC AUTO: 90.4 FL (ref 79–97)
MICROALBUMIN/CREAT UR: NORMAL MG/G{CREAT}
NITRITE UR QL STRIP: NEGATIVE
PH UR STRIP.AUTO: 6 [PH] (ref 5–8)
PLATELET # BLD AUTO: 225 10*3/MM3 (ref 140–450)
PMV BLD AUTO: 11.9 FL (ref 6–12)
POTASSIUM SERPL-SCNC: 5.5 MMOL/L (ref 3.5–5.2)
PROT SERPL-MCNC: 6.4 G/DL (ref 6–8.5)
PROT UR QL STRIP: NEGATIVE
PSA SERPL-MCNC: 2.57 NG/ML (ref 0–4)
RBC # BLD AUTO: 5.39 10*6/MM3 (ref 4.14–5.8)
SODIUM SERPL-SCNC: 135 MMOL/L (ref 136–145)
SP GR UR STRIP: 1.01 (ref 1–1.03)
T3FREE SERPL-MCNC: 2.49 PG/ML (ref 2–4.4)
T4 FREE SERPL-MCNC: 1.12 NG/DL (ref 0.93–1.7)
TSH SERPL DL<=0.05 MIU/L-ACNC: 1.66 UIU/ML (ref 0.27–4.2)
UROBILINOGEN UR QL STRIP: NORMAL
WBC NRBC COR # BLD: 14.87 10*3/MM3 (ref 3.4–10.8)

## 2022-06-14 PROCEDURE — 80061 LIPID PANEL: CPT

## 2022-06-14 PROCEDURE — 84153 ASSAY OF PSA TOTAL: CPT

## 2022-06-14 PROCEDURE — 82306 VITAMIN D 25 HYDROXY: CPT

## 2022-06-14 PROCEDURE — 80050 GENERAL HEALTH PANEL: CPT

## 2022-06-14 PROCEDURE — 82570 ASSAY OF URINE CREATININE: CPT

## 2022-06-14 PROCEDURE — 82043 UR ALBUMIN QUANTITATIVE: CPT

## 2022-06-14 PROCEDURE — 84481 FREE ASSAY (FT-3): CPT

## 2022-06-14 PROCEDURE — 81003 URINALYSIS AUTO W/O SCOPE: CPT

## 2022-06-14 PROCEDURE — 84439 ASSAY OF FREE THYROXINE: CPT

## 2022-06-14 PROCEDURE — 83704 LIPOPROTEIN BLD QUAN PART: CPT

## 2022-06-14 PROCEDURE — 82550 ASSAY OF CK (CPK): CPT

## 2022-06-14 PROCEDURE — 83036 HEMOGLOBIN GLYCOSYLATED A1C: CPT

## 2022-06-14 PROCEDURE — 36415 COLL VENOUS BLD VENIPUNCTURE: CPT

## 2022-06-16 LAB
CHOLEST SERPL-MCNC: 135 MG/DL (ref 100–199)
HDL SERPL-SCNC: 41.1 UMOL/L
HDLC SERPL-MCNC: 51 MG/DL
LDL SERPL QN: 19.8 NM
LDL SERPL-SCNC: 957 NMOL/L
LDL SMALL SERPL-SCNC: 684 NMOL/L
LDLC SERPL CALC-MCNC: 62 MG/DL (ref 0–99)
TRIGL SERPL-MCNC: 127 MG/DL (ref 0–149)

## 2022-06-22 ENCOUNTER — OFFICE VISIT (OUTPATIENT)
Dept: INTERNAL MEDICINE | Facility: CLINIC | Age: 62
End: 2022-06-22

## 2022-06-22 VITALS
RESPIRATION RATE: 18 BRPM | BODY MASS INDEX: 24.82 KG/M2 | HEART RATE: 58 BPM | WEIGHT: 173.4 LBS | SYSTOLIC BLOOD PRESSURE: 122 MMHG | DIASTOLIC BLOOD PRESSURE: 60 MMHG | OXYGEN SATURATION: 97 % | HEIGHT: 70 IN

## 2022-06-22 DIAGNOSIS — E11.9 ENCOUNTER FOR DIABETIC FOOT EXAM: Chronic | ICD-10-CM

## 2022-06-22 DIAGNOSIS — Z87.891 FORMER CIGARETTE SMOKER: ICD-10-CM

## 2022-06-22 DIAGNOSIS — E11.29 TYPE 2 DIABETES MELLITUS WITH MICROALBUMINURIA, WITHOUT LONG-TERM CURRENT USE OF INSULIN: Chronic | ICD-10-CM

## 2022-06-22 DIAGNOSIS — Z12.2 ENCOUNTER FOR SCREENING FOR LUNG CANCER: ICD-10-CM

## 2022-06-22 DIAGNOSIS — R07.89 OTHER CHEST PAIN: ICD-10-CM

## 2022-06-22 DIAGNOSIS — Z51.81 THERAPEUTIC DRUG MONITORING: ICD-10-CM

## 2022-06-22 DIAGNOSIS — E55.9 VITAMIN D DEFICIENCY: Chronic | ICD-10-CM

## 2022-06-22 DIAGNOSIS — Z00.00 ROUTINE PHYSICAL EXAMINATION: Primary | ICD-10-CM

## 2022-06-22 DIAGNOSIS — D72.828 OTHER ELEVATED WHITE BLOOD CELL COUNT: ICD-10-CM

## 2022-06-22 DIAGNOSIS — E78.2 MIXED HYPERLIPIDEMIA: Chronic | ICD-10-CM

## 2022-06-22 DIAGNOSIS — R80.9 MICROALBUMINURIA: Chronic | ICD-10-CM

## 2022-06-22 DIAGNOSIS — Z23 NEED FOR PNEUMOCOCCAL VACCINATION: ICD-10-CM

## 2022-06-22 DIAGNOSIS — R80.9 TYPE 2 DIABETES MELLITUS WITH MICROALBUMINURIA, WITHOUT LONG-TERM CURRENT USE OF INSULIN: Chronic | ICD-10-CM

## 2022-06-22 DIAGNOSIS — E03.9 PRIMARY HYPOTHYROIDISM: Chronic | ICD-10-CM

## 2022-06-22 PROBLEM — R07.9 CHEST PAIN: Status: RESOLVED | Noted: 2021-12-31 | Resolved: 2022-06-22

## 2022-06-22 PROBLEM — M25.512 CHRONIC LEFT SHOULDER PAIN: Chronic | Status: RESOLVED | Noted: 2021-06-14 | Resolved: 2022-06-22

## 2022-06-22 PROBLEM — M25.512 CHRONIC LEFT SHOULDER PAIN: Chronic | Status: ACTIVE | Noted: 2021-06-14

## 2022-06-22 PROBLEM — G89.29 CHRONIC LEFT SHOULDER PAIN: Chronic | Status: RESOLVED | Noted: 2021-06-14 | Resolved: 2022-06-22

## 2022-06-22 PROBLEM — G89.29 CHRONIC PAIN OF LEFT KNEE: Chronic | Status: RESOLVED | Noted: 2021-01-20 | Resolved: 2022-06-22

## 2022-06-22 PROBLEM — M25.562 CHRONIC PAIN OF LEFT KNEE: Chronic | Status: RESOLVED | Noted: 2021-01-20 | Resolved: 2022-06-22

## 2022-06-22 PROBLEM — G89.29 CHRONIC LEFT SHOULDER PAIN: Chronic | Status: ACTIVE | Noted: 2021-06-14

## 2022-06-22 PROCEDURE — 90677 PCV20 VACCINE IM: CPT | Performed by: INTERNAL MEDICINE

## 2022-06-22 PROCEDURE — 90471 IMMUNIZATION ADMIN: CPT | Performed by: INTERNAL MEDICINE

## 2022-06-22 PROCEDURE — 99396 PREV VISIT EST AGE 40-64: CPT | Performed by: INTERNAL MEDICINE

## 2022-06-22 NOTE — PROGRESS NOTES
06/22/2022    Patient Information  [facility]                                                                                          [address]  [place] [address]      1960  [unfilled]  [phone] (work)    Chief Complaint:     Routine physical examination and follow-up blood work.  No new acute complaints.    History of Present Illness:    Patient with type 2 diabetes, microalbuminuria, hypothyroidism, hyperlipidemia, vitamin D deficiency, complaints of chest pain in January of this year, subsequently admitted to the hospital and cardiac work-up negative.  He presents today for his routine annual physical exam and follow-up blood work.  His past medical history reviewed and updated were necessary including health maintenance parameters.  This reveals he is behind on several preventative issues including diabetic foot exam which we will do today, diabetic eye exam which I encouraged him to get, Prevnar 20 vaccine which we will give today, and possibly COVID-vaccine, lung cancer screening.  See below.    Review of Systems   Constitutional: Negative.   HENT: Negative.    Eyes: Negative.    Cardiovascular: Negative.    Respiratory: Negative.    Endocrine: Negative.    Hematologic/Lymphatic: Negative.    Skin: Negative.    Musculoskeletal: Negative.    Gastrointestinal: Negative.    Genitourinary: Negative.    Neurological: Negative.    Psychiatric/Behavioral: Negative.    Allergic/Immunologic: Negative.        Active Problems:    Patient Active Problem List   Diagnosis   • Hiatal hernia   • Hyperlipidemia   • Primary hypothyroidism   • Microalbuminuria   • Type 2 diabetes mellitus with microalbuminuria, without long-term current use of insulin (HCC)   • Vitamin D deficiency   • Therapeutic drug monitoring   • Routine physical examination   • Diabetic foot exam   • Diabetic eye exam (HCC)   • Chronic pain of left knee   • Chronic left shoulder pain   • Former cigarette smoker   •  Other elevated white blood cell count         Past Medical History:   Diagnosis Date   • Chronic left shoulder pain 6/14/2021 June 14, 2021--patient presents with approximately 1 month history of left shoulder pain that is particularly bad when he first gets up in the morning.  On exam he has obvious decreased range of motion with pain.  No deformity.  The pain is present despite the meloxicam and I think he should be evaluated by orthopedics that specializes in shoulders.   • Chronic pain of left knee 1/20/2021   • Hiatal hernia 7/28/2011 07/28/2011--3 cm hiatal hernia, otherwise normal EGD.   • History of coronary angiogram 2010 2010-- patient had a heart catheterization which was normal.   • History of nephrolithiasis Approximately 1985    Approximately 1985--patient passed a kidney stone stone spontaneously.   • History of peptic ulcer Approximately 1979    Approximately 1979--patient was told he had a peptic ulcer and was placed on Mylanta.  It sounds as if he had an EGD.   • Hyperlipidemia 6/22/2012 06/22/2012--treatment for hyperlipidemia begun.   • Hypothyroidism 4/27/2012 05/04/2012--treatment for hypothyroidism begun.  05/01/2012--ultrasound of the thyroid revealed increased hypervascularity, slightly heterogeneous echo.   04/27/2012--initial diagnosis of hypothyroidism.   • Microalbuminuria 5/15/2015    05/15/2015--urine microalbumin slightly elevated at 19.4. Normal range 0.0--17.0.   • Vitamin D deficiency 3/21/2016         Past Surgical History:   Procedure Laterality Date   • COLONOSCOPY  07/28/2011 07/28/2011--normal colonoscopy with an excellent prep.   • ESOPHAGOSCOPY / EGD  07/28/2011 07/28/2011--3 cm hiatal hernia, otherwise normal EGD.   • TONSILLECTOMY      11 years old.         No Known Allergies        Current Outpatient Medications:   •  Ascorbic Acid (VITAMIN C PO), Take  by mouth., Disp: , Rfl:   •  atorvastatin (LIPITOR) 20 MG tablet, TAKE 1 TABLET BY MOUTH DAILY FOR  "HIGH CHOLESTEROL, Disp: 30 tablet, Rfl: 11  •  Cholecalciferol (VITAMIN D3) 5000 UNITS capsule capsule, Take  by mouth., Disp: , Rfl:   •  ezetimibe (ZETIA) 10 MG tablet, TAKE 1 TABLET BY MOUTH DAILY FOR HIGH CHOLESTEROL, Disp: 30 tablet, Rfl: 11  •  levothyroxine (SYNTHROID, LEVOTHROID) 100 MCG tablet, TAKE 1 TABLET BY MOUTH EVERY DAY, Disp: 90 tablet, Rfl: 3  •  meloxicam (MOBIC) 15 MG tablet, Take 1 p.o. daily for arthritis of the knees, Disp: 30 tablet, Rfl: 6  •  multivitamin (THERAGRAN) tablet tablet, Take 1 tablet by mouth daily., Disp: , Rfl:   •  SITagliptin-metFORMIN HCl ER (Janumet XR) 100-1000 MG tablet, TAKE 1 TABLET BY MOUTH DAILY WITH FOOD FOR DIABETES, Disp: 30 tablet, Rfl: 11      Family History   Problem Relation Age of Onset   • Coronary artery disease Mother    • Diabetes Mother    • Hypertension Mother         Essential   • Hyperlipidemia Mother    • Diabetes Father    • Hypertension Father         Essential   • Hyperlipidemia Father    • Coronary artery disease Brother    • Diabetes Brother    • Hypertension Brother         Essential   • Hyperlipidemia Brother          Social History     Socioeconomic History   • Marital status:    Tobacco Use   • Smoking status: Former Smoker     Packs/day: 3.00     Years: 8.00     Pack years: 24.00     Types: Cigars     Start date: 2013     Quit date: 2021     Years since quittin.5   • Smokeless tobacco: Never Used   Vaping Use   • Vaping Use: Never used   Substance and Sexual Activity   • Alcohol use: Yes     Alcohol/week: 2.0 - 3.0 standard drinks     Types: 2 - 3 Shots of liquor per week     Comment: Moderately   • Drug use: No   • Sexual activity: Yes     Partners: Female         Vitals:    22 0829   BP: 122/60   Pulse: 58   Resp: 18   SpO2: 97%   Weight: 78.7 kg (173 lb 6.4 oz)   Height: 177.8 cm (70\")        Body mass index is 24.88 kg/m².      Physical Exam:    General: Alert and oriented x 3.  No acute distress.  Normal " affect.  HEENT: Pupils equal, round, reactive to light; extraocular movements intact; sclerae nonicteric; pharynx, ear canals and TMs normal.  Neck: Without JVD, thyromegaly, bruit, or adenopathy.  Lungs: Clear to auscultation in all fields.  Heart: Regular rate and rhythm without murmur, rub, gallop, or click.  Abdomen: Soft, nontender, without hepatosplenomegaly or hernia.  Bowel sounds normal.  : Deferred.  Rectal: Deferred.  Extremities: Without clubbing, cyanosis, edema, or pulse deficit.  Neurologic: Intact without focal deficit.  Normal station and gait observed during ingress and egress from the examination room.  Skin: Without significant lesion.  Musculoskeletal: Unremarkable.    June 22, 2022--routine diabetic foot exam reveals no evidence of diabetic ulcer or preulcerative callus.  Distal pulses palpable.  Sensation intact.    Lab/other results:    CBC normal except white count elevated at 14.87.  CPK normal.  CMP normal except glucose 118, sodium 135, potassium mildly elevated at 5.5.  Hemoglobin A1c 6.5.  Total cholesterol 135, triglycerides 127, LDL particle #957, small LDL particle number mildly elevated 684, HDL particle number very good at 41.1.  Vitamin D normal at 44.9.  Urinalysis normal.  Thyroid function tests are normal.  PSA normal at 2.57.  Microalbumin is not present.    Assessment/Plan:     Diagnosis Plan   1. Routine physical examination     2. Type 2 diabetes mellitus with microalbuminuria, without long-term current use of insulin ([facility])  Comprehensive Metabolic Panel    Hemoglobin A1c   3. Microalbuminuria     4. Primary hypothyroidism  TSH    T4, Free    T3, Free   5. Hyperlipidemia  CK    Comprehensive Metabolic Panel    NMR LipoProfile   6. Vitamin D deficiency  Vitamin D 25 Hydroxy   7. Diabetic foot exam     8. Other chest pain     9. Therapeutic drug monitoring     10. Need for pneumococcal vaccination  Pneumococcal Conjugate Vaccine 20-Valent (PCV20)   11. Former cigarette  smoker  CT Chest Low Dose Wo   12. Encounter for screening for lung cancer  CT Chest Low Dose Wo   13. Other elevated white blood cell count  CBC & Differential     Patient presents with essentially normal annual physical except for the following issues: He has type 2 diabetes is under excellent control with Janumet.  Hyperlipidemia is also under excellent control with Lipitor and ezetimibe.  His thyroid is therapeutic on the current dose of levothyroxine 100 mcg/day.  Microalbuminuria is not evident but we will continue to monitor.  Vitamin D is in the normal range with supplementation.  Normal diabetic foot exam.  Patient was admitted to the hospital briefly in January for complaints of chest pain which is noncardiac and musculoskeletal in nature.  Stress test was negative.  Patient has little low sodium and elevated potassium and I suspect laboratory error.  Also his white count is elevated and this needs to be assessed as well.    Several preventative health issues discussed including review of vaccinations and recommendations, including dietary issues, exercise and weight loss.  Safe sex practices discussed.  Patient advised to wear seatbelt whenever driving and avoid texting and driving.  Also advised to look both ways before crossing the street.  Patient is up-to-date on his Cologuard advised to avoid tobacco products and minimize alcohol consumption.    Plan is as follows: Prevnar 20 given.  Strongly recommend diabetic eye exam.  Recommend COVID booster.  Low-dose lung cancer screening.  Low carbohydrate diet, maintenance of ideal body weight, exercise.  No changes in current medical regimen.  Patient will follow-up in 6 months with lab prior or follow-up as needed.    Procedures

## 2022-07-06 ENCOUNTER — HOSPITAL ENCOUNTER (OUTPATIENT)
Dept: CT IMAGING | Facility: HOSPITAL | Age: 62
Discharge: HOME | End: 2022-07-06
Admitting: INTERNAL MEDICINE

## 2022-07-06 DIAGNOSIS — Z87.891 FORMER CIGARETTE SMOKER: ICD-10-CM

## 2022-07-06 DIAGNOSIS — Z12.2 ENCOUNTER FOR SCREENING FOR LUNG CANCER: ICD-10-CM

## 2022-07-06 PROCEDURE — 71271 CT THORAX LUNG CANCER SCR C-: CPT

## 2022-12-15 ENCOUNTER — LAB (OUTPATIENT)
Dept: LAB | Facility: HOSPITAL | Age: 62
End: 2022-12-15

## 2022-12-15 DIAGNOSIS — E78.2 MIXED HYPERLIPIDEMIA: Chronic | ICD-10-CM

## 2022-12-15 DIAGNOSIS — R80.9 TYPE 2 DIABETES MELLITUS WITH MICROALBUMINURIA, WITHOUT LONG-TERM CURRENT USE OF INSULIN: Chronic | ICD-10-CM

## 2022-12-15 DIAGNOSIS — E03.9 PRIMARY HYPOTHYROIDISM: Chronic | ICD-10-CM

## 2022-12-15 DIAGNOSIS — E55.9 VITAMIN D DEFICIENCY: Chronic | ICD-10-CM

## 2022-12-15 DIAGNOSIS — D72.828 OTHER ELEVATED WHITE BLOOD CELL COUNT: ICD-10-CM

## 2022-12-15 DIAGNOSIS — E11.29 TYPE 2 DIABETES MELLITUS WITH MICROALBUMINURIA, WITHOUT LONG-TERM CURRENT USE OF INSULIN: Chronic | ICD-10-CM

## 2022-12-15 LAB
25(OH)D3 SERPL-MCNC: 46.7 NG/ML (ref 30–100)
ALBUMIN SERPL-MCNC: 4.7 G/DL (ref 3.5–5.2)
ALBUMIN/GLOB SERPL: 2 G/DL
ALP SERPL-CCNC: 65 U/L (ref 39–117)
ALT SERPL W P-5'-P-CCNC: 28 U/L (ref 1–41)
ANION GAP SERPL CALCULATED.3IONS-SCNC: 9.5 MMOL/L (ref 5–15)
AST SERPL-CCNC: 25 U/L (ref 1–40)
BASOPHILS # BLD AUTO: 0.08 10*3/MM3 (ref 0–0.2)
BASOPHILS NFR BLD AUTO: 0.6 % (ref 0–1.5)
BILIRUB SERPL-MCNC: 0.5 MG/DL (ref 0–1.2)
BUN SERPL-MCNC: 12 MG/DL (ref 8–23)
BUN/CREAT SERPL: 12.9 (ref 7–25)
CALCIUM SPEC-SCNC: 9.5 MG/DL (ref 8.6–10.5)
CHLORIDE SERPL-SCNC: 104 MMOL/L (ref 98–107)
CK SERPL-CCNC: 141 U/L (ref 20–200)
CO2 SERPL-SCNC: 29.5 MMOL/L (ref 22–29)
CREAT SERPL-MCNC: 0.93 MG/DL (ref 0.76–1.27)
DEPRECATED RDW RBC AUTO: 43.6 FL (ref 37–54)
EGFRCR SERPLBLD CKD-EPI 2021: 93.4 ML/MIN/1.73
EOSINOPHIL # BLD AUTO: 0.48 10*3/MM3 (ref 0–0.4)
EOSINOPHIL NFR BLD AUTO: 3.7 % (ref 0.3–6.2)
ERYTHROCYTE [DISTWIDTH] IN BLOOD BY AUTOMATED COUNT: 13.1 % (ref 12.3–15.4)
GLOBULIN UR ELPH-MCNC: 2.3 GM/DL
GLUCOSE SERPL-MCNC: 136 MG/DL (ref 65–99)
HBA1C MFR BLD: 6.1 % (ref 3.5–5.6)
HCT VFR BLD AUTO: 46.9 % (ref 37.5–51)
HGB BLD-MCNC: 15.4 G/DL (ref 13–17.7)
IMM GRANULOCYTES # BLD AUTO: 0.05 10*3/MM3 (ref 0–0.05)
IMM GRANULOCYTES NFR BLD AUTO: 0.4 % (ref 0–0.5)
LYMPHOCYTES # BLD AUTO: 2.22 10*3/MM3 (ref 0.7–3.1)
LYMPHOCYTES NFR BLD AUTO: 17.1 % (ref 19.6–45.3)
MCH RBC QN AUTO: 29.6 PG (ref 26.6–33)
MCHC RBC AUTO-ENTMCNC: 32.8 G/DL (ref 31.5–35.7)
MCV RBC AUTO: 90 FL (ref 79–97)
MONOCYTES # BLD AUTO: 0.87 10*3/MM3 (ref 0.1–0.9)
MONOCYTES NFR BLD AUTO: 6.7 % (ref 5–12)
NEUTROPHILS NFR BLD AUTO: 71.5 % (ref 42.7–76)
NEUTROPHILS NFR BLD AUTO: 9.26 10*3/MM3 (ref 1.7–7)
NRBC BLD AUTO-RTO: 0 /100 WBC (ref 0–0.2)
PLATELET # BLD AUTO: 207 10*3/MM3 (ref 140–450)
PMV BLD AUTO: 12.2 FL (ref 6–12)
POTASSIUM SERPL-SCNC: 5.2 MMOL/L (ref 3.5–5.2)
PROT SERPL-MCNC: 7 G/DL (ref 6–8.5)
RBC # BLD AUTO: 5.21 10*6/MM3 (ref 4.14–5.8)
SODIUM SERPL-SCNC: 143 MMOL/L (ref 136–145)
T3FREE SERPL-MCNC: 3.41 PG/ML (ref 2–4.4)
T4 FREE SERPL-MCNC: 1.39 NG/DL (ref 0.93–1.7)
TSH SERPL DL<=0.05 MIU/L-ACNC: 1.83 UIU/ML (ref 0.27–4.2)
WBC NRBC COR # BLD: 12.96 10*3/MM3 (ref 3.4–10.8)

## 2022-12-15 PROCEDURE — 36415 COLL VENOUS BLD VENIPUNCTURE: CPT

## 2022-12-15 PROCEDURE — 80050 GENERAL HEALTH PANEL: CPT

## 2022-12-15 PROCEDURE — 82306 VITAMIN D 25 HYDROXY: CPT

## 2022-12-15 PROCEDURE — 84439 ASSAY OF FREE THYROXINE: CPT

## 2022-12-15 PROCEDURE — 83036 HEMOGLOBIN GLYCOSYLATED A1C: CPT

## 2022-12-15 PROCEDURE — 82550 ASSAY OF CK (CPK): CPT

## 2022-12-15 PROCEDURE — 80061 LIPID PANEL: CPT

## 2022-12-15 PROCEDURE — 83704 LIPOPROTEIN BLD QUAN PART: CPT

## 2022-12-15 PROCEDURE — 84481 FREE ASSAY (FT-3): CPT

## 2022-12-17 LAB
CHOLEST SERPL-MCNC: 123 MG/DL (ref 100–199)
HDL SERPL-SCNC: 34.3 UMOL/L
HDLC SERPL-MCNC: 55 MG/DL
LDL SERPL QN: 20.4 NM
LDL SERPL-SCNC: 582 NMOL/L
LDL SMALL SERPL-SCNC: 362 NMOL/L
LDLC SERPL CALC-MCNC: 53 MG/DL (ref 0–99)
TRIGL SERPL-MCNC: 71 MG/DL (ref 0–149)

## 2022-12-22 ENCOUNTER — OFFICE VISIT (OUTPATIENT)
Dept: INTERNAL MEDICINE | Facility: CLINIC | Age: 62
End: 2022-12-22

## 2022-12-22 VITALS
BODY MASS INDEX: 24.34 KG/M2 | OXYGEN SATURATION: 96 % | SYSTOLIC BLOOD PRESSURE: 112 MMHG | HEIGHT: 70 IN | WEIGHT: 170 LBS | RESPIRATION RATE: 18 BRPM | DIASTOLIC BLOOD PRESSURE: 60 MMHG | HEART RATE: 69 BPM

## 2022-12-22 DIAGNOSIS — E78.2 MIXED HYPERLIPIDEMIA: Chronic | ICD-10-CM

## 2022-12-22 DIAGNOSIS — Z23 NEED FOR INFLUENZA VACCINATION: ICD-10-CM

## 2022-12-22 DIAGNOSIS — Z87.891 FORMER CIGARETTE SMOKER: ICD-10-CM

## 2022-12-22 DIAGNOSIS — E03.9 PRIMARY HYPOTHYROIDISM: Chronic | ICD-10-CM

## 2022-12-22 DIAGNOSIS — R80.9 MICROALBUMINURIA: Chronic | ICD-10-CM

## 2022-12-22 DIAGNOSIS — Z51.81 THERAPEUTIC DRUG MONITORING: ICD-10-CM

## 2022-12-22 DIAGNOSIS — D72.828 OTHER ELEVATED WHITE BLOOD CELL COUNT: ICD-10-CM

## 2022-12-22 DIAGNOSIS — Z00.00 ROUTINE PHYSICAL EXAMINATION: ICD-10-CM

## 2022-12-22 DIAGNOSIS — E55.9 VITAMIN D DEFICIENCY: Chronic | ICD-10-CM

## 2022-12-22 DIAGNOSIS — E11.29 TYPE 2 DIABETES MELLITUS WITH MICROALBUMINURIA, WITHOUT LONG-TERM CURRENT USE OF INSULIN: Primary | Chronic | ICD-10-CM

## 2022-12-22 DIAGNOSIS — R80.9 TYPE 2 DIABETES MELLITUS WITH MICROALBUMINURIA, WITHOUT LONG-TERM CURRENT USE OF INSULIN: Primary | Chronic | ICD-10-CM

## 2022-12-22 PROCEDURE — 99214 OFFICE O/P EST MOD 30 MIN: CPT | Performed by: INTERNAL MEDICINE

## 2022-12-22 PROCEDURE — 90471 IMMUNIZATION ADMIN: CPT | Performed by: INTERNAL MEDICINE

## 2022-12-22 PROCEDURE — 90686 IIV4 VACC NO PRSV 0.5 ML IM: CPT | Performed by: INTERNAL MEDICINE

## 2022-12-22 NOTE — PROGRESS NOTES
12/22/2022    Patient Information  [facility]                                                                                          [address]  [place] [address]      1960  [unfilled]  [phone] (work)    Chief Complaint:     Follow-up multiple medical problems recent blood work.  No new acute complaints.    History of Present Illness:    Patient with type 2 diabetes, microalbuminuria, hyperlipidemia, hypothyroidism, vitamin D deficiency, former cigarette smoker, history of elevated white count.  He presents today for follow-up with lab prior in order to monitor his chronic medical issues.  His past medical history reviewed and updated were necessary including health maintenance parameters.  This reveals he may need influenza vaccine.  See below.    Review of Systems   Constitutional: Negative.   HENT: Negative.    Eyes: Negative.    Cardiovascular: Negative.    Respiratory: Negative.    Endocrine: Negative.    Hematologic/Lymphatic: Negative.    Skin: Negative.    Musculoskeletal: Negative.    Gastrointestinal: Negative.    Genitourinary: Negative.    Neurological: Negative.    Psychiatric/Behavioral: Negative.    Allergic/Immunologic: Negative.        Active Problems:    Patient Active Problem List   Diagnosis   • Hiatal hernia   • Hyperlipidemia   • Primary hypothyroidism   • Microalbuminuria   • Type 2 diabetes mellitus with microalbuminuria, without long-term current use of insulin (HCC)   • Vitamin D deficiency   • Therapeutic drug monitoring   • Routine physical examination   • Diabetic foot exam   • Diabetic eye exam (HCC)   • Former cigarette smoker   • Other elevated white blood cell count         Past Medical History:   Diagnosis Date   • Former cigarette smoker 6/22/2022   • Hiatal hernia 07/28/2011 07/28/2011--3 cm hiatal hernia, otherwise normal EGD.   • History of coronary angiogram 2010 2010-- patient had a heart catheterization which was normal.   •  History of nephrolithiasis Approximately 1985    Approximately 1985--patient passed a kidney stone stone spontaneously.   • History of peptic ulcer Approximately 1979    Approximately 1979--patient was told he had a peptic ulcer and was placed on Mylanta.  It sounds as if he had an EGD.   • Hyperlipidemia 06/22/2012 06/22/2012--treatment for hyperlipidemia begun.   • Hypothyroidism 04/27/2012 05/04/2012--treatment for hypothyroidism begun.  05/01/2012--ultrasound of the thyroid revealed increased hypervascularity, slightly heterogeneous echo.   04/27/2012--initial diagnosis of hypothyroidism.   • Microalbuminuria 05/15/2015    05/15/2015--urine microalbumin slightly elevated at 19.4. Normal range 0.0--17.0.   • Vitamin D deficiency 03/21/2016         Past Surgical History:   Procedure Laterality Date   • COLONOSCOPY  07/28/2011 07/28/2011--normal colonoscopy with an excellent prep.   • ESOPHAGOSCOPY / EGD  07/28/2011 07/28/2011--3 cm hiatal hernia, otherwise normal EGD.   • ROTATOR CUFF REPAIR Left 02/04/2022 February 4, 2022--left rotator cuff surgery.   • TONSILLECTOMY      11 years old.         No Known Allergies        Current Outpatient Medications:   •  Ascorbic Acid (VITAMIN C PO), Take  by mouth., Disp: , Rfl:   •  atorvastatin (LIPITOR) 20 MG tablet, TAKE 1 TABLET BY MOUTH DAILY FOR HIGH CHOLESTEROL, Disp: 30 tablet, Rfl: 11  •  Cholecalciferol (VITAMIN D3) 5000 UNITS capsule capsule, Take  by mouth., Disp: , Rfl:   •  ezetimibe (ZETIA) 10 MG tablet, TAKE 1 TABLET BY MOUTH DAILY FOR HIGH CHOLESTEROL, Disp: 30 tablet, Rfl: 11  •  levothyroxine (SYNTHROID, LEVOTHROID) 100 MCG tablet, TAKE 1 TABLET BY MOUTH EVERY DAY, Disp: 90 tablet, Rfl: 3  •  multivitamin (THERAGRAN) tablet tablet, Take 1 tablet by mouth daily., Disp: , Rfl:   •  SITagliptin-metFORMIN HCl ER (Janumet XR) 100-1000 MG tablet, TAKE 1 TABLET BY MOUTH DAILY WITH FOOD FOR DIABETES, Disp: 30 tablet, Rfl: 11      Family History  "  Problem Relation Age of Onset   • Coronary artery disease Mother    • Diabetes Mother    • Hypertension Mother         Essential   • Hyperlipidemia Mother    • Diabetes Father    • Hypertension Father         Essential   • Hyperlipidemia Father    • Coronary artery disease Brother    • Diabetes Brother    • Hypertension Brother         Essential   • Hyperlipidemia Brother          Social History     Socioeconomic History   • Marital status:    Tobacco Use   • Smoking status: Former     Packs/day: 3.00     Years: 8.00     Pack years: 24.00     Types: Cigars, Cigarettes     Start date: 2013     Quit date: 2021     Years since quittin.0   • Smokeless tobacco: Never   Vaping Use   • Vaping Use: Never used   Substance and Sexual Activity   • Alcohol use: Yes     Alcohol/week: 2.0 - 3.0 standard drinks     Types: 2 - 3 Shots of liquor per week     Comment: Moderately   • Drug use: No   • Sexual activity: Yes     Partners: Female         Vitals:    22 1454   BP: 112/60   Pulse: 69   Resp: 18   SpO2: 96%   Weight: 77.1 kg (170 lb)   Height: 177.8 cm (70\")        Body mass index is 24.39 kg/m².      Physical Exam:    General: Alert and oriented x 3.  No acute distress.  Normal affect.  HEENT: Pupils equal, round, reactive to light; extraocular movements intact; sclerae nonicteric; pharynx, ear canals and TMs normal.  Neck: Without JVD, thyromegaly, bruit, or adenopathy.  Lungs: Clear to auscultation in all fields.  Heart: Regular rate and rhythm without murmur, rub, gallop, or click.  Abdomen: Soft, nontender, without hepatosplenomegaly or hernia.  Bowel sounds normal.  : Deferred.  Rectal: Deferred.  Extremities: Without clubbing, cyanosis, edema, or pulse deficit.  Neurologic: Intact without focal deficit.  Normal station and gait observed during ingress and egress from the examination room.  Skin: Without significant lesion.  Musculoskeletal: Unremarkable.    Lab/other results:    CPK " normal at 141.  CMP normal except glucose 136.  Hemoglobin A1c 6.1.  Total cholesterol 123, triglycerides 71, LDL particle #582, small LDL particle #362, HDL particle number normal at 34.3.  Thyroid function tests are normal.  CBC normal except white count mildly elevated at 12.96.  Lymphocyte percentage is low at 17.1.  Absolute neutrophils elevated at 9.26.  Eosinophils absolute elevated at 0.48.    Assessment/Plan:     Diagnosis Plan   1. Type 2 diabetes mellitus with microalbuminuria, without long-term current use of insulin ([facility])  Comprehensive Metabolic Panel    Hemoglobin A1c    Microalbumin / Creatinine Urine Ratio - Urine, Clean Catch    Urinalysis With Microscopic If Indicated (No Culture) - Urine, Clean Catch      2. Hyperlipidemia  CK    Comprehensive Metabolic Panel    NMR LipoProfile      3. Primary hypothyroidism  TSH    T4, Free    T3, Free      4. Microalbuminuria  Microalbumin / Creatinine Urine Ratio - Urine, Clean Catch      5. Vitamin D deficiency  Vitamin D,25-Hydroxy      6. Former cigarette smoker        7. Other elevated white blood cell count  CBC & Differential      8. Therapeutic drug monitoring  PSA DIAGNOSTIC      9. Routine physical examination        10. Need for influenza vaccination  FluLaval/Fluzone >6 mos ([id number])        Patient has type 2 diabetes that is under excellent control.  However, he may be a candidate for Ozempic in order to reduce his cardiovascular risk.  We had a discussion regarding this today.  Hyperlipidemia is under excellent control with Zetia and Lipitor.  His thyroid is therapeutic on the current dose of levothyroxine.  Microalbumin that has been mild and stable.  Vitamin D has been in normal range with supplementation.  Patient is a former cigarette smoker and is up-to-date on lung cancer screening.  He has a mild persistent elevation of his white count that needs to be monitored closely.  May need referral to hematology.    Plan is as follows: Recommend  influenza vaccine.  No change in current medical regimen.  However, he is due for his annual physical in June of next year and at that point we may consider Ozempic.  He does not want to make a change in his medications during the holidays due to potential side effects.        Procedures

## 2023-02-13 DIAGNOSIS — E78.2 MIXED HYPERLIPIDEMIA: Chronic | ICD-10-CM

## 2023-02-13 RX ORDER — EZETIMIBE 10 MG/1
TABLET ORAL
Qty: 30 TABLET | Refills: 11 | Status: SHIPPED | OUTPATIENT
Start: 2023-02-13

## 2023-02-14 DIAGNOSIS — E78.2 MIXED HYPERLIPIDEMIA: Chronic | ICD-10-CM

## 2023-02-14 RX ORDER — ATORVASTATIN CALCIUM 20 MG/1
TABLET, FILM COATED ORAL
Qty: 30 TABLET | Refills: 11 | Status: SHIPPED | OUTPATIENT
Start: 2023-02-14

## 2023-03-09 ENCOUNTER — APPOINTMENT (OUTPATIENT)
Dept: CT IMAGING | Facility: HOSPITAL | Age: 63
End: 2023-03-09
Payer: COMMERCIAL

## 2023-03-09 ENCOUNTER — HOSPITAL ENCOUNTER (EMERGENCY)
Facility: HOSPITAL | Age: 63
Discharge: HOME | End: 2023-03-09
Attending: EMERGENCY MEDICINE | Admitting: EMERGENCY MEDICINE
Payer: COMMERCIAL

## 2023-03-09 VITALS
RESPIRATION RATE: 18 BRPM | BODY MASS INDEX: 24.05 KG/M2 | DIASTOLIC BLOOD PRESSURE: 69 MMHG | TEMPERATURE: 98.2 F | HEART RATE: 64 BPM | SYSTOLIC BLOOD PRESSURE: 112 MMHG | HEIGHT: 70 IN | OXYGEN SATURATION: 91 % | WEIGHT: 168 LBS

## 2023-03-09 DIAGNOSIS — K57.92 DIVERTICULITIS: ICD-10-CM

## 2023-03-09 DIAGNOSIS — R74.8 ELEVATED LIPASE: ICD-10-CM

## 2023-03-09 DIAGNOSIS — R10.32 LEFT LOWER QUADRANT ABDOMINAL PAIN: Primary | ICD-10-CM

## 2023-03-09 LAB
ALBUMIN SERPL-MCNC: 4.1 G/DL (ref 3.5–5.2)
ALBUMIN/GLOB SERPL: 1.6 G/DL
ALP SERPL-CCNC: 58 U/L (ref 39–117)
ALT SERPL W P-5'-P-CCNC: 26 U/L (ref 1–41)
ANION GAP SERPL CALCULATED.3IONS-SCNC: 9 MMOL/L (ref 5–15)
AST SERPL-CCNC: 23 U/L (ref 1–40)
BASOPHILS # BLD AUTO: 0.1 10*3/MM3 (ref 0–0.2)
BASOPHILS NFR BLD AUTO: 0.5 % (ref 0–1.5)
BILIRUB SERPL-MCNC: 0.3 MG/DL (ref 0–1.2)
BILIRUB UR QL STRIP: NEGATIVE
BUN SERPL-MCNC: 14 MG/DL (ref 8–23)
BUN/CREAT SERPL: 19.7 (ref 7–25)
CALCIUM SPEC-SCNC: 9.9 MG/DL (ref 8.6–10.5)
CHLORIDE SERPL-SCNC: 102 MMOL/L (ref 98–107)
CLARITY UR: CLEAR
CO2 SERPL-SCNC: 29 MMOL/L (ref 22–29)
COLOR UR: YELLOW
CREAT SERPL-MCNC: 0.71 MG/DL (ref 0.76–1.27)
DEPRECATED RDW RBC AUTO: 48.1 FL (ref 37–54)
EGFRCR SERPLBLD CKD-EPI 2021: 104.4 ML/MIN/1.73
EOSINOPHIL # BLD AUTO: 0.3 10*3/MM3 (ref 0–0.4)
EOSINOPHIL NFR BLD AUTO: 3.3 % (ref 0.3–6.2)
ERYTHROCYTE [DISTWIDTH] IN BLOOD BY AUTOMATED COUNT: 14 % (ref 12.3–15.4)
GLOBULIN UR ELPH-MCNC: 2.5 GM/DL
GLUCOSE SERPL-MCNC: 105 MG/DL (ref 65–99)
GLUCOSE UR STRIP-MCNC: NEGATIVE MG/DL
HCT VFR BLD AUTO: 45.9 % (ref 37.5–51)
HGB BLD-MCNC: 14.9 G/DL (ref 13–17.7)
HGB UR QL STRIP.AUTO: NEGATIVE
HOLD SPECIMEN: NORMAL
KETONES UR QL STRIP: ABNORMAL
LEUKOCYTE ESTERASE UR QL STRIP.AUTO: NEGATIVE
LIPASE SERPL-CCNC: 98 U/L (ref 13–60)
LYMPHOCYTES # BLD AUTO: 2.7 10*3/MM3 (ref 0.7–3.1)
LYMPHOCYTES NFR BLD AUTO: 26.5 % (ref 19.6–45.3)
MCH RBC QN AUTO: 30.2 PG (ref 26.6–33)
MCHC RBC AUTO-ENTMCNC: 32.5 G/DL (ref 31.5–35.7)
MCV RBC AUTO: 93 FL (ref 79–97)
MONOCYTES # BLD AUTO: 0.7 10*3/MM3 (ref 0.1–0.9)
MONOCYTES NFR BLD AUTO: 7 % (ref 5–12)
NEUTROPHILS NFR BLD AUTO: 6.5 10*3/MM3 (ref 1.7–7)
NEUTROPHILS NFR BLD AUTO: 62.7 % (ref 42.7–76)
NITRITE UR QL STRIP: NEGATIVE
NRBC BLD AUTO-RTO: 0.1 /100 WBC (ref 0–0.2)
PH UR STRIP.AUTO: 6 [PH] (ref 5–8)
PLATELET # BLD AUTO: 193 10*3/MM3 (ref 140–450)
PMV BLD AUTO: 9.6 FL (ref 6–12)
POTASSIUM SERPL-SCNC: 4.4 MMOL/L (ref 3.5–5.2)
PROT SERPL-MCNC: 6.6 G/DL (ref 6–8.5)
PROT UR QL STRIP: NEGATIVE
RBC # BLD AUTO: 4.94 10*6/MM3 (ref 4.14–5.8)
SODIUM SERPL-SCNC: 140 MMOL/L (ref 136–145)
SP GR UR STRIP: 1.02 (ref 1–1.03)
UROBILINOGEN UR QL STRIP: ABNORMAL
WBC NRBC COR # BLD: 10.4 10*3/MM3 (ref 3.4–10.8)
WHOLE BLOOD HOLD COAG: NORMAL

## 2023-03-09 PROCEDURE — 85025 COMPLETE CBC W/AUTO DIFF WBC: CPT | Performed by: NURSE PRACTITIONER

## 2023-03-09 PROCEDURE — 81003 URINALYSIS AUTO W/O SCOPE: CPT | Performed by: NURSE PRACTITIONER

## 2023-03-09 PROCEDURE — 74177 CT ABD & PELVIS W/CONTRAST: CPT

## 2023-03-09 PROCEDURE — 25510000001 IOPAMIDOL PER 1 ML: Performed by: EMERGENCY MEDICINE

## 2023-03-09 PROCEDURE — 80053 COMPREHEN METABOLIC PANEL: CPT | Performed by: NURSE PRACTITIONER

## 2023-03-09 PROCEDURE — 83690 ASSAY OF LIPASE: CPT | Performed by: NURSE PRACTITIONER

## 2023-03-09 PROCEDURE — 99283 EMERGENCY DEPT VISIT LOW MDM: CPT

## 2023-03-09 RX ORDER — HYDROCODONE BITARTRATE AND ACETAMINOPHEN 5; 325 MG/1; MG/1
1 TABLET ORAL ONCE AS NEEDED
Status: COMPLETED | OUTPATIENT
Start: 2023-03-09 | End: 2023-03-09

## 2023-03-09 RX ORDER — AMOXICILLIN AND CLAVULANATE POTASSIUM 875; 125 MG/1; MG/1
1 TABLET, FILM COATED ORAL 2 TIMES DAILY
Qty: 20 TABLET | Refills: 0 | Status: SHIPPED | OUTPATIENT
Start: 2023-03-09 | End: 2023-03-20

## 2023-03-09 RX ORDER — SODIUM CHLORIDE 0.9 % (FLUSH) 0.9 %
10 SYRINGE (ML) INJECTION AS NEEDED
Status: DISCONTINUED | OUTPATIENT
Start: 2023-03-09 | End: 2023-03-09 | Stop reason: HOSPADM

## 2023-03-09 RX ORDER — HYDROCODONE BITARTRATE AND ACETAMINOPHEN 5; 325 MG/1; MG/1
1 TABLET ORAL EVERY 6 HOURS PRN
Qty: 12 TABLET | Refills: 0 | Status: SHIPPED | OUTPATIENT
Start: 2023-03-09 | End: 2023-03-20

## 2023-03-09 RX ORDER — AMOXICILLIN AND CLAVULANATE POTASSIUM 875; 125 MG/1; MG/1
1 TABLET, FILM COATED ORAL ONCE
Status: COMPLETED | OUTPATIENT
Start: 2023-03-09 | End: 2023-03-09

## 2023-03-09 RX ADMIN — IOPAMIDOL 100 ML: 755 INJECTION, SOLUTION INTRAVENOUS at 17:33

## 2023-03-09 RX ADMIN — AMOXICILLIN AND CLAVULANATE POTASSIUM 1 TABLET: 875; 125 TABLET, FILM COATED ORAL at 18:50

## 2023-03-09 RX ADMIN — HYDROCODONE BITARTRATE AND ACETAMINOPHEN 1 TABLET: 5; 325 TABLET ORAL at 18:51

## 2023-03-09 NOTE — ED NOTES
Pt reports left sided flank pain off and on for around a month. Pt was seen in urgent care and was told it was a pulled muscle. Pt went back to work today and after his break around 10:15 he began having severe left sided groin pain that brought him to the ground. Pt reports it lasted 30 min and subsided but the pain is still present.

## 2023-03-09 NOTE — ED PROVIDER NOTES
Subjective   History of Present Illness  Patient presents with:  Groin Pain: Left side groin pain, started 2 hours ago,  denies urinary symptoms.      Lj Wong MD   No LMP for male patient.  Patient is a 62-year-old male presents the ED with a complaint of left inguinal pain, left lower quadrant abdominal pain.  Patient reports this began this week.  He reports began after he was lifting something heavy with his son, subsided, then returned again today while he was working.  He denies any urinary symptoms.  No nausea, vomiting diarrhea.  No fever or chills.          Review of Systems   Constitutional: Negative for chills and fever.   Respiratory: Negative for shortness of breath.    Cardiovascular: Negative for chest pain.   Gastrointestinal: Positive for abdominal pain. Negative for diarrhea, nausea and vomiting.   Genitourinary: Positive for flank pain. Negative for dysuria, frequency and hematuria.   Musculoskeletal: Negative for back pain and neck pain.   Skin: Negative for color change and rash.       Past Medical History:   Diagnosis Date   • Former cigarette smoker 6/22/2022   • Hiatal hernia 07/28/2011 07/28/2011--3 cm hiatal hernia, otherwise normal EGD.   • History of coronary angiogram 2010 2010-- patient had a heart catheterization which was normal.   • History of nephrolithiasis Approximately 1985    Approximately 1985--patient passed a kidney stone stone spontaneously.   • History of peptic ulcer Approximately 1979    Approximately 1979--patient was told he had a peptic ulcer and was placed on Mylanta.  It sounds as if he had an EGD.   • Hyperlipidemia 06/22/2012 06/22/2012--treatment for hyperlipidemia begun.   • Hypothyroidism 04/27/2012 05/04/2012--treatment for hypothyroidism begun.  05/01/2012--ultrasound of the thyroid revealed increased hypervascularity, slightly heterogeneous echo.   04/27/2012--initial diagnosis of hypothyroidism.   • Microalbuminuria 05/15/2015     05/15/2015--urine microalbumin slightly elevated at 19.4. Normal range 0.0--17.0.   • Vitamin D deficiency 2016       No Known Allergies    Past Surgical History:   Procedure Laterality Date   • COLONOSCOPY  2011--normal colonoscopy with an excellent prep.   • ESOPHAGOSCOPY / EGD  2011--3 cm hiatal hernia, otherwise normal EGD.   • ROTATOR CUFF REPAIR Left 2022--left rotator cuff surgery.   • TONSILLECTOMY      11 years old.       Family History   Problem Relation Age of Onset   • Coronary artery disease Mother    • Diabetes Mother    • Hypertension Mother         Essential   • Hyperlipidemia Mother    • Diabetes Father    • Hypertension Father         Essential   • Hyperlipidemia Father    • Coronary artery disease Brother    • Diabetes Brother    • Hypertension Brother         Essential   • Hyperlipidemia Brother        Social History     Socioeconomic History   • Marital status:    Tobacco Use   • Smoking status: Former     Packs/day: 3.00     Years: 8.00     Pack years: 24.00     Types: Cigars, Cigarettes     Start date: 2013     Quit date: 2021     Years since quittin.2   • Smokeless tobacco: Never   Vaping Use   • Vaping Use: Never used   Substance and Sexual Activity   • Alcohol use: Yes     Alcohol/week: 2.0 - 3.0 standard drinks     Types: 2 - 3 Shots of liquor per week     Comment: Moderately   • Drug use: No   • Sexual activity: Yes     Partners: Female           Objective   Physical Exam  Vitals and nursing note reviewed.   Constitutional:       Appearance: Normal appearance.   HENT:      Head: Normocephalic and atraumatic.      Nose: Nose normal.      Mouth/Throat:      Mouth: Mucous membranes are moist.      Pharynx: Oropharynx is clear.   Eyes:      Extraocular Movements: Extraocular movements intact.      Conjunctiva/sclera: Conjunctivae normal.      Pupils: Pupils are equal, round, and reactive to light.  "  Cardiovascular:      Rate and Rhythm: Normal rate and regular rhythm.      Heart sounds: Normal heart sounds. No murmur heard.    No friction rub. No gallop.   Pulmonary:      Effort: Pulmonary effort is normal.      Breath sounds: Normal breath sounds.   Abdominal:      General: Bowel sounds are normal.      Palpations: Abdomen is soft.      Tenderness: There is abdominal tenderness in the left lower quadrant. There is no guarding or rebound.      Hernia: No hernia is present.      Comments: Mild left lower quadrant tenderness to palpation.  No peritoneal signs   Musculoskeletal:         General: Normal range of motion.      Cervical back: Normal range of motion and neck supple.   Skin:     General: Skin is warm and dry.      Capillary Refill: Capillary refill takes less than 2 seconds.   Neurological:      Mental Status: He is alert and oriented to person, place, and time.   Psychiatric:         Mood and Affect: Mood normal.         Behavior: Behavior normal.         Procedures           ED Course  ED Course as of 03/09/23 2109   Thu Mar 09, 2023   2106 BUN: 14 [LB]      ED Course User Index  [LB] Yarelis Najera, JONATHAN      /69   Pulse 64   Temp 98.2 °F (36.8 °C) (Oral)   Resp 18   Ht 177.8 cm (70\")   Wt 76.2 kg (168 lb)   SpO2 91%   BMI 24.11 kg/m²   Labs Reviewed   COMPREHENSIVE METABOLIC PANEL - Abnormal; Notable for the following components:       Result Value    Glucose 105 (*)     Creatinine 0.71 (*)     All other components within normal limits    Narrative:     GFR Normal >60  Chronic Kidney Disease <60  Kidney Failure <15     LIPASE - Abnormal; Notable for the following components:    Lipase 98 (*)     All other components within normal limits   URINALYSIS W/ CULTURE IF INDICATED - Abnormal; Notable for the following components:    Ketones, UA Trace (*)     All other components within normal limits    Narrative:     In absence of clinical symptoms, the presence of pyuria, bacteria, " and/or nitrites on the urinalysis result does not correlate with infection.  Urine microscopic not indicated.   CBC WITH AUTO DIFFERENTIAL - Normal   CBC AND DIFFERENTIAL    Narrative:     The following orders were created for panel order CBC & Differential.  Procedure                               Abnormality         Status                     ---------                               -----------         ------                     CBC Auto Differential[[id number]]        Normal              Final result                 Please view results for these tests on the individual orders.   EXTRA TUBES    Narrative:     The following orders were created for panel order Extra Tubes.  Procedure                               Abnormality         Status                     ---------                               -----------         ------                     Gold Top - SST[[id number]]                                   Final result               Light Blue Top[[id number]]                                   Final result                 Please view results for these tests on the individual orders.   GOLD TOP - SST   LIGHT BLUE TOP     Medications   iopamidol (ISOVUE-370) 76 % injection 100 mL (100 mL Intravenous Given 3/9/23 1733)   amoxicillin-clavulanate (AUGMENTIN) 875-125 MG per tablet 1 tablet (1 tablet Oral Given 3/9/23 1850)   HYDROcodone-acetaminophen (NORCO) 5-325 MG per tablet 1 tablet (1 tablet Oral Given 3/9/23 1851)     CT Abdomen Pelvis With Contrast   Final Result   1.Findings may represent very mild acute diverticulitis involving distal left colon. Please correlate with signs and symptoms. Follow-up recommended.   2.Gastric wall thickening may represent gastritis or other etiologies. Distal esophageal wall thickening may represent esophagitis or other etiologies. Small hiatal hernia. Follow-up recommended.                     Electronically Signed: Corky Stone     3/9/2023 5:56 PM EST     Workstation ID: [id number]                   Patient INSPECT report queried and reviewed.  I discussed with the patient the risk and benefits associated with opiate/narcotic pain medication today.  Based on patient's exam findings and assessment, I do feel it appropriate to prescribe a short course of opiate/ narcotic pain medication from the emergency department.  The patient was advised of the risks associated with such medication, including risk of dependency.  Patient was advised of medication administration instructions, appropriate use, potential side effects and adverse reactions.  Acknowledged and verbalized understanding, and agrees to treatment.                           Medical Decision Making  Diverticulitis: acute illness or injury  Elevated lipase: acute illness or injury  Left lower quadrant abdominal pain: acute illness or injury  Amount and/or Complexity of Data Reviewed  Labs: ordered. Decision-making details documented in ED Course.  Radiology: ordered.      Risk  Prescription drug management.        Patient is a 62-year-old male presented the ED with a complaint of left flank pain, left lower abdominal pain, groin pain.  Differential diagnoses: Hernia, obstruction, ureterolithiasis, diverticulitis work-up in the ED revealed left lower quadrant tenderness on exam, CMP reviewed.  Urine negative.   elevated lipase at 98.  CBC is normal.  CT abdomen pelvis obtained, which reveals findings concerning for diverticulitis, which would correlate with the patient's left lower quadrant abdominal pain.  Patient would like to go home.  He will place on Augmentin and short course of pain medication.  He agrees the current treatment plan  Disposition : I spoke with the patient at the bedside regarding their plan of care, discharge instruction,  prescriptions, as well as reasons to return to the emergency department.  We discussed test results at the bedside, including incidental abnormal labs, radiological findings, understands need and  importance  for follow-up with primary care or specialist if indicated.  Patient verbalizes understanding and agrees to the treatment plan at this time.   Pt is aware that discharge does not mean that nothing is wrong but it indicates no emergency is present and they must continue care with follow-up as given below or physician of their choice.    Note Disclaimer: At [facility], we believe that sharing information builds trust and better relationships. You are receiving this note because you recently visited [facility]. It is possible you will see health information before a provider has talked with you about it. This kind of information can be easy to misunderstand. To help you fully understand what it means for your health, we urge you to discuss this note with your provider.Note dictated utilizing Dragon Dictation. Appropriate PPE worn during patient interactions.    Final diagnoses:   Left lower quadrant abdominal pain   Diverticulitis   Elevated lipase       ED Disposition  ED Disposition     ED Disposition   Discharge    Condition   Stable    Comment   --             Lj Wong MD  [address] [address]  [phone]    Schedule an appointment as soon as possible for a visit       [facility] EMERGENCY DEPARTMENT  [address] [address] [phone]    As needed, If symptoms worsen    [facility]  [address] [address] [phone]  Schedule an appointment as soon as possible for a visit            Medication List      New Prescriptions    amoxicillin-clavulanate 875-125 MG per tablet  Commonly known as: AUGMENTIN  Take 1 tablet by mouth 2 (Two) Times a Day for 10 days.     HYDROcodone-acetaminophen 5-325 MG per tablet  Commonly known as: NORCO  Take 1 tablet by mouth Every 6 (Six) Hours As Needed for Moderate Pain.           Where to Get Your Medications      These medications  were sent to [id number] - [place], [place] - [address] AT SEC OF [facility] [address] - [phone] PH - [phone] FX  [address], [facility] [place] [address]    Phone: [phone]   · amoxicillin-clavulanate 875-125 MG per tablet  · HYDROcodone-acetaminophen 5-325 MG per tablet          Yarelis Najera, JONATHAN  03/09/23 4353

## 2023-03-09 NOTE — DISCHARGE INSTRUCTIONS
Please follow-up with your primary care provider for reevaluation, call for an appointment  Return to the ED for new or worsening symptoms  Take stool softener while taking pain medication

## 2023-03-13 ENCOUNTER — HOSPITAL ENCOUNTER (EMERGENCY)
Facility: HOSPITAL | Age: 63
Discharge: HOME | End: 2023-03-13
Admitting: EMERGENCY MEDICINE
Payer: COMMERCIAL

## 2023-03-13 ENCOUNTER — APPOINTMENT (OUTPATIENT)
Dept: CT IMAGING | Facility: HOSPITAL | Age: 63
End: 2023-03-13
Payer: COMMERCIAL

## 2023-03-13 VITALS
TEMPERATURE: 98 F | SYSTOLIC BLOOD PRESSURE: 124 MMHG | DIASTOLIC BLOOD PRESSURE: 81 MMHG | WEIGHT: 171.3 LBS | BODY MASS INDEX: 24.52 KG/M2 | HEIGHT: 70 IN | RESPIRATION RATE: 16 BRPM | OXYGEN SATURATION: 99 % | HEART RATE: 61 BPM

## 2023-03-13 DIAGNOSIS — R10.32 LEFT LOWER QUADRANT ABDOMINAL PAIN: Primary | ICD-10-CM

## 2023-03-13 LAB
ALBUMIN SERPL-MCNC: 4.3 G/DL (ref 3.5–5.2)
ALBUMIN/GLOB SERPL: 1.8 G/DL
ALP SERPL-CCNC: 55 U/L (ref 39–117)
ALT SERPL W P-5'-P-CCNC: 21 U/L (ref 1–41)
ANION GAP SERPL CALCULATED.3IONS-SCNC: 12 MMOL/L (ref 5–15)
AST SERPL-CCNC: 23 U/L (ref 1–40)
BASOPHILS # BLD AUTO: 0.1 10*3/MM3 (ref 0–0.2)
BASOPHILS NFR BLD AUTO: 0.7 % (ref 0–1.5)
BILIRUB SERPL-MCNC: 0.3 MG/DL (ref 0–1.2)
BUN SERPL-MCNC: 15 MG/DL (ref 8–23)
BUN/CREAT SERPL: 21.1 (ref 7–25)
CALCIUM SPEC-SCNC: 9.5 MG/DL (ref 8.6–10.5)
CHLORIDE SERPL-SCNC: 102 MMOL/L (ref 98–107)
CO2 SERPL-SCNC: 24 MMOL/L (ref 22–29)
CREAT SERPL-MCNC: 0.71 MG/DL (ref 0.76–1.27)
D-LACTATE SERPL-SCNC: 0.4 MMOL/L (ref 0.3–2)
D-LACTATE SERPL-SCNC: <0.3 MMOL/L (ref 0.3–2)
DEPRECATED RDW RBC AUTO: 42 FL (ref 37–54)
EGFRCR SERPLBLD CKD-EPI 2021: 104.4 ML/MIN/1.73
EOSINOPHIL # BLD AUTO: 0.2 10*3/MM3 (ref 0–0.4)
EOSINOPHIL NFR BLD AUTO: 1.7 % (ref 0.3–6.2)
ERYTHROCYTE [DISTWIDTH] IN BLOOD BY AUTOMATED COUNT: 13.5 % (ref 12.3–15.4)
GLOBULIN UR ELPH-MCNC: 2.4 GM/DL
GLUCOSE SERPL-MCNC: 95 MG/DL (ref 65–99)
HCT VFR BLD AUTO: 45.9 % (ref 37.5–51)
HGB BLD-MCNC: 15.2 G/DL (ref 13–17.7)
HOLD SPECIMEN: NORMAL
LIPASE SERPL-CCNC: 36 U/L (ref 13–60)
LYMPHOCYTES # BLD AUTO: 1.7 10*3/MM3 (ref 0.7–3.1)
LYMPHOCYTES NFR BLD AUTO: 17.6 % (ref 19.6–45.3)
MCH RBC QN AUTO: 30.3 PG (ref 26.6–33)
MCHC RBC AUTO-ENTMCNC: 33.1 G/DL (ref 31.5–35.7)
MCV RBC AUTO: 91.4 FL (ref 79–97)
MONOCYTES # BLD AUTO: 0.7 10*3/MM3 (ref 0.1–0.9)
MONOCYTES NFR BLD AUTO: 6.7 % (ref 5–12)
NEUTROPHILS NFR BLD AUTO: 7.2 10*3/MM3 (ref 1.7–7)
NEUTROPHILS NFR BLD AUTO: 73.3 % (ref 42.7–76)
NRBC BLD AUTO-RTO: 0 /100 WBC (ref 0–0.2)
PLATELET # BLD AUTO: 192 10*3/MM3 (ref 140–450)
PMV BLD AUTO: 9.2 FL (ref 6–12)
POTASSIUM SERPL-SCNC: 4.4 MMOL/L (ref 3.5–5.2)
PROT SERPL-MCNC: 6.7 G/DL (ref 6–8.5)
RBC # BLD AUTO: 5.02 10*6/MM3 (ref 4.14–5.8)
SODIUM SERPL-SCNC: 138 MMOL/L (ref 136–145)
WBC NRBC COR # BLD: 9.8 10*3/MM3 (ref 3.4–10.8)
WHOLE BLOOD HOLD COAG: NORMAL

## 2023-03-13 PROCEDURE — 74177 CT ABD & PELVIS W/CONTRAST: CPT

## 2023-03-13 PROCEDURE — 80053 COMPREHEN METABOLIC PANEL: CPT | Performed by: PHYSICIAN ASSISTANT

## 2023-03-13 PROCEDURE — 83605 ASSAY OF LACTIC ACID: CPT

## 2023-03-13 PROCEDURE — 96361 HYDRATE IV INFUSION ADD-ON: CPT

## 2023-03-13 PROCEDURE — 96375 TX/PRO/DX INJ NEW DRUG ADDON: CPT

## 2023-03-13 PROCEDURE — 83690 ASSAY OF LIPASE: CPT | Performed by: PHYSICIAN ASSISTANT

## 2023-03-13 PROCEDURE — 25510000001 IOPAMIDOL PER 1 ML: Performed by: PHYSICIAN ASSISTANT

## 2023-03-13 PROCEDURE — 25010000002 MORPHINE PER 10 MG: Performed by: PHYSICIAN ASSISTANT

## 2023-03-13 PROCEDURE — 99283 EMERGENCY DEPT VISIT LOW MDM: CPT

## 2023-03-13 PROCEDURE — 96374 THER/PROPH/DIAG INJ IV PUSH: CPT

## 2023-03-13 PROCEDURE — 85025 COMPLETE CBC W/AUTO DIFF WBC: CPT | Performed by: PHYSICIAN ASSISTANT

## 2023-03-13 PROCEDURE — 25010000002 ONDANSETRON PER 1 MG: Performed by: PHYSICIAN ASSISTANT

## 2023-03-13 RX ORDER — ONDANSETRON 2 MG/ML
4 INJECTION INTRAMUSCULAR; INTRAVENOUS ONCE
Status: COMPLETED | OUTPATIENT
Start: 2023-03-13 | End: 2023-03-13

## 2023-03-13 RX ORDER — SODIUM CHLORIDE 0.9 % (FLUSH) 0.9 %
10 SYRINGE (ML) INJECTION AS NEEDED
Status: DISCONTINUED | OUTPATIENT
Start: 2023-03-13 | End: 2023-03-14 | Stop reason: HOSPADM

## 2023-03-13 RX ADMIN — MORPHINE SULFATE 4 MG: 4 INJECTION INTRAVENOUS at 13:36

## 2023-03-13 RX ADMIN — SODIUM CHLORIDE 1000 ML: 9 INJECTION, SOLUTION INTRAVENOUS at 13:36

## 2023-03-13 RX ADMIN — IOPAMIDOL 100 ML: 755 INJECTION, SOLUTION INTRAVENOUS at 14:51

## 2023-03-13 RX ADMIN — ONDANSETRON 4 MG: 2 INJECTION INTRAMUSCULAR; INTRAVENOUS at 13:35

## 2023-03-13 NOTE — DISCHARGE INSTRUCTIONS
Continue pain and nausea medicine as previously directed.  Follow-up with gastroenterology as previously scheduled.    Follow-up with your primary care provider in 3-5 days.  If you do not have a primary care provider call [phone] for help in finding one, or you may follow up with [facility] at [phone].    Return to ED for any new or worsening symptoms

## 2023-03-13 NOTE — ED PROVIDER NOTES
Subjective   History of Present Illness  Patient is a 62-year-old male with PMH significant for recent diagnosis of diverticulitis who presents to the ED with lower left quadrant abdominal pain x2 hours.  Patient states he was seen in the ED on 3/9 for lower left quadrant pain and was diagnosed with diverticulitis and discharged home on Augmentin.  Patient states he has been taking antibiotics as prescribed and abdominal pain was improving until a few hours ago.  Patient states onset of pain was sudden and described it as stabbing.  Patient currently rates the pain 5/10 and is experiencing nausea.  Patient took hydrocodone for pain with mild relief.  Patient denies fever, chills, vomiting, diarrhea, constipation, urinary symptoms.  Patient denies any chest pain or shortness of breath.    History provided by:  Patient      Review of Systems   Constitutional: Negative.    HENT: Negative.    Respiratory: Negative.    Cardiovascular: Negative.    Gastrointestinal: Positive for abdominal pain and nausea. Negative for constipation, diarrhea and vomiting.   Genitourinary: Negative for dysuria, flank pain, frequency, hematuria and urgency.   Musculoskeletal: Negative for arthralgias, back pain and myalgias.   Skin: Negative for color change, pallor, rash and wound.   Neurological: Negative.        Past Medical History:   Diagnosis Date   • Former cigarette smoker 6/22/2022   • Hiatal hernia 07/28/2011 07/28/2011--3 cm hiatal hernia, otherwise normal EGD.   • History of coronary angiogram 2010 2010-- patient had a heart catheterization which was normal.   • History of nephrolithiasis Approximately 1985    Approximately 1985--patient passed a kidney stone stone spontaneously.   • History of peptic ulcer Approximately 1979    Approximately 1979--patient was told he had a peptic ulcer and was placed on Mylanta.  It sounds as if he had an EGD.   • Hyperlipidemia 06/22/2012 06/22/2012--treatment for hyperlipidemia begun.    • Hypothyroidism 2012--treatment for hypothyroidism begun.  2012--ultrasound of the thyroid revealed increased hypervascularity, slightly heterogeneous echo.   2012--initial diagnosis of hypothyroidism.   • Microalbuminuria 05/15/2015    05/15/2015--urine microalbumin slightly elevated at 19.4. Normal range 0.0--17.0.   • Vitamin D deficiency 2016       No Known Allergies    Past Surgical History:   Procedure Laterality Date   • COLONOSCOPY  2011--normal colonoscopy with an excellent prep.   • ESOPHAGOSCOPY / EGD  2011--3 cm hiatal hernia, otherwise normal EGD.   • ROTATOR CUFF REPAIR Left 2022--left rotator cuff surgery.   • TONSILLECTOMY      11 years old.       Family History   Problem Relation Age of Onset   • Coronary artery disease Mother    • Diabetes Mother    • Hypertension Mother         Essential   • Hyperlipidemia Mother    • Diabetes Father    • Hypertension Father         Essential   • Hyperlipidemia Father    • Coronary artery disease Brother    • Diabetes Brother    • Hypertension Brother         Essential   • Hyperlipidemia Brother        Social History     Socioeconomic History   • Marital status:    Tobacco Use   • Smoking status: Former     Packs/day: 3.00     Years: 8.00     Pack years: 24.00     Types: Cigars, Cigarettes     Start date: 2013     Quit date: 2021     Years since quittin.2   • Smokeless tobacco: Never   Vaping Use   • Vaping Use: Never used   Substance and Sexual Activity   • Alcohol use: Yes     Alcohol/week: 2.0 - 3.0 standard drinks     Types: 2 - 3 Shots of liquor per week     Comment: Moderately   • Drug use: No   • Sexual activity: Yes     Partners: Female           Objective   Physical Exam  Vitals and nursing note reviewed.   Constitutional:       General: He is not in acute distress.     Appearance: Normal appearance. He is well-developed. He  "is not ill-appearing, toxic-appearing or diaphoretic.   HENT:      Head: Normocephalic and atraumatic.      Mouth/Throat:      Mouth: Mucous membranes are moist.      Pharynx: Oropharynx is clear.   Eyes:      General: No scleral icterus.     Extraocular Movements: Extraocular movements intact.      Pupils: Pupils are equal, round, and reactive to light.   Cardiovascular:      Rate and Rhythm: Normal rate and regular rhythm.      Pulses: Normal pulses.      Heart sounds: No murmur heard.    No friction rub. No gallop.   Pulmonary:      Effort: Pulmonary effort is normal. No tachypnea, accessory muscle usage or respiratory distress.      Breath sounds: Normal breath sounds. No stridor. No decreased breath sounds, wheezing, rhonchi or rales.   Chest:      Chest wall: No mass, deformity, tenderness or crepitus.   Abdominal:      General: Bowel sounds are normal. There is no distension. There are no signs of injury.      Palpations: Abdomen is soft.      Tenderness: There is abdominal tenderness in the left lower quadrant. There is no right CVA tenderness, left CVA tenderness, guarding or rebound.   Skin:     General: Skin is warm.      Capillary Refill: Capillary refill takes less than 2 seconds.      Findings: No rash.   Neurological:      General: No focal deficit present.      Mental Status: He is alert and oriented to person, place, and time.      GCS: GCS eye subscore is 4. GCS verbal subscore is 5. GCS motor subscore is 6.   Psychiatric:         Mood and Affect: Mood normal.         Behavior: Behavior normal.         Procedures           ED Course    Blood pressure 124/81, pulse 61, temperature 98 °F (36.7 °C), temperature source Temporal, resp. rate 16, height 177.8 cm (70\"), weight 77.7 kg (171 lb 4.8 oz), SpO2 99 %.    Medications   sodium chloride 0.9 % flush 10 mL (has no administration in time range)   sodium chloride 0.9 % bolus 1,000 mL (0 mL Intravenous Stopped 3/13/23 3945)   ondansetron (ZOFRAN) " injection 4 mg (4 mg Intravenous Given 3/13/23 1335)   morphine injection 4 mg (4 mg Intravenous Given 3/13/23 1336)   iopamidol (ISOVUE-370) 76 % injection 100 mL (100 mL Intravenous Given 3/13/23 1451)     Labs Reviewed   COMPREHENSIVE METABOLIC PANEL - Abnormal; Notable for the following components:       Result Value    Creatinine 0.71 (*)     All other components within normal limits    Narrative:     GFR Normal >60  Chronic Kidney Disease <60  Kidney Failure <15     CBC WITH AUTO DIFFERENTIAL - Abnormal; Notable for the following components:    Lymphocyte % 17.6 (*)     Neutrophils, Absolute 7.20 (*)     All other components within normal limits   POC LACTATE - Abnormal; Notable for the following components:    Lactate <0.3 (*)     All other components within normal limits   LIPASE - Normal   POC LACTATE - Normal   POC LACTATE   CBC AND DIFFERENTIAL    Narrative:     The following orders were created for panel order CBC & Differential.  Procedure                               Abnormality         Status                     ---------                               -----------         ------                     CBC Auto Differential[[id number]]        Abnormal            Final result                 Please view results for these tests on the individual orders.   EXTRA TUBES    Narrative:     The following orders were created for panel order Extra Tubes.  Procedure                               Abnormality         Status                     ---------                               -----------         ------                     Gold Top - SST[[id number]]                                   Final result               Light Blue Top[[id number]]                                   Final result                 Please view results for these tests on the individual orders.   GOLD TOP - SST   LIGHT BLUE TOP     CT Abdomen Pelvis With Contrast    Result Date: 3/13/2023  Impression: 1. No acute findings in the abdomen and pelvis.  The previously described mild acute diverticulitis of the distal descending colon has resolved. 2. There is diverticulosis within the sigmoid colon and descending colon. 3. Fat density umbilical and left inguinal hernias. 4. Mild dependent atelectasis in the lung bases. Electronically Signed: Neo Gardner  3/13/2023 3:03 PM EDT  Workstation ID: [id number]                                           Medical Decision Making  Chart Review: ED visit reviewed from 3/9/2023 patient was found to have mild diverticulitis on CT abdomen and pelvis was sent home on Augmentin and Norco.  Urgent care visit reviewed from 3/7/2023 had also presented there with complaints of left lower quadrant abdominal pain he reported at that time it started about a month ago    Comorbidity: As per past medical history  Differentials: Worsening diverticulitis, intra-abdominal abscess, perforation     ;this list is not all inclusive and does not constitute the entirety of considered causes  Labs: as above   Radiology: My interpretation      Disposition/Treatment:  Appropriate PPE was worn during exam and throughout all encounters with the patient.  When the ED IV was placed and labs were obtained patient placed on proper monitors he was afebrile and appeared nontoxic was given morphine Zofran and fluids for pain and nausea.  Patient was recently diagnosed with diverticulitis however patient reports worsening severe pain today.  Repeat labs and imaging were obtained to rule out any sort of intra-abdominal abscess or bowel perforation type complication from his diverticulitis.    Repeat labs today show a normal white count and patient was hemodynamically stable.  Metabolic panel fairly unremarkable.  Lipase 36 POC lactic 0.4 CT abdomen and pelvis shows resolved diverticulitis no signs of other acute intra-abdominal infection or obstruction as above.  Upon reassessment patient is resting quietly.  Lab results and findings were discussed with the patient  and family at bedside he voiced understanding of discharge along with signs and symptoms to return he was advised to continue antibiotics and pain medicine as previously directed.  Patient states he does have follow-up with gastroenterology at the end of the month he was advised to keep this appointment for further evaluation and follow-up with PCP if his abdominal pain returns.  All questions were answered at bedside.     This document is intended for medical expert use only. Reading of this document by patients and/or patient's family without participating medical staff guidance may result in misinterpretation and unintended morbidity.  Any interpretation of such data is the responsibility of the patient and/or family member responsible for the patient in concert with their primary or specialist providers, not to be left for sources of online searches such as [employer], [employer] or similar queries. Relying on these approaches to knowledge may result in misinterpretation, misguided goals of care and even death should patients or family members try recommendations outside of the realm of professional medical care in a supervised inpatient environment.       Left lower quadrant abdominal pain: acute illness or injury  Amount and/or Complexity of Data Reviewed  External Data Reviewed: labs and notes.  Labs: ordered. Decision-making details documented in ED Course.  Radiology: ordered. Decision-making details documented in ED Course.      Risk  Prescription drug management.          Final diagnoses:   Left lower quadrant abdominal pain       ED Disposition  ED Disposition     ED Disposition   Discharge    Condition   Stable    Comment   --             Lj Wong MD  [address] [id number] [phone]    Schedule an appointment as soon as possible for a visit in 3 days      [facility] EMERGENCY DEPARTMENT  [facility] [facility] [address] [phone]  Go to   If  symptoms worsen         Medication List      No changes were made to your prescriptions during this visit.          Pete Anne PA  03/13/23 4423

## 2023-03-16 ENCOUNTER — OFFICE VISIT (OUTPATIENT)
Dept: INTERNAL MEDICINE | Facility: CLINIC | Age: 63
End: 2023-03-16
Payer: COMMERCIAL

## 2023-03-16 VITALS
HEIGHT: 70 IN | OXYGEN SATURATION: 97 % | WEIGHT: 168.6 LBS | HEART RATE: 60 BPM | DIASTOLIC BLOOD PRESSURE: 50 MMHG | SYSTOLIC BLOOD PRESSURE: 108 MMHG | BODY MASS INDEX: 24.14 KG/M2

## 2023-03-16 DIAGNOSIS — R10.32 LEFT LOWER QUADRANT ABDOMINAL PAIN: Primary | ICD-10-CM

## 2023-03-16 DIAGNOSIS — K40.90 NON-RECURRENT UNILATERAL INGUINAL HERNIA WITHOUT OBSTRUCTION OR GANGRENE: ICD-10-CM

## 2023-03-16 PROCEDURE — 99214 OFFICE O/P EST MOD 30 MIN: CPT | Performed by: NURSE PRACTITIONER

## 2023-03-16 NOTE — PROGRESS NOTES
"Chief Complaint  [facility] ER followup to abdominal pain    Subjective        Prudencio Mckinley presents to [facility]  History of Present Illness    Patient is a pleasant 62-year-old male who typically sees Dr. Wong here in the office.  Patient is new to me and presents the clinic today with complaints of left lower quadrant abdominal pain.  Patient has been to the emergency room x2.  CT of the abdomen and pelvis was completed on March 13, 2023 with no acute findings in the abdomen and pelvis.  Previously described mild acute diverticulitis of the distal descending colon was resolved.  There is diverticulosis within the sigmoid colon and ascending colon.  Fat density umbilical and left inguinal hernias noted.  Patient was sent home with antinausea medication and to follow-up with gastroenterology.    Patient appears to have a left lower quadrant hernia that is protruding.  Patient states with prolonged standing and walking that it does burn and is painful.  No redness noted around the area but there is some swelling. Denies any fever, chills, or SOB.       Objective   Vital Signs:  /50 (BP Location: Left arm, Patient Position: Sitting, Cuff Size: Adult)   Pulse 60   Ht 177.8 cm (70\")   Wt 76.5 kg (168 lb 9.6 oz)   SpO2 97%   BMI 24.19 kg/m²   Estimated body mass index is 24.19 kg/m² as calculated from the following:    Height as of this encounter: 177.8 cm (70\").    Weight as of this encounter: 76.5 kg (168 lb 9.6 oz).       BMI is within normal parameters. No other follow-up for BMI required.      Physical Exam  Vitals and nursing note reviewed.   HENT:      Head: Normocephalic.      Nose: Nose normal.      Mouth/Throat:      Mouth: Mucous membranes are moist.   Eyes:      Pupils: Pupils are equal, round, and reactive to light.   Cardiovascular:      Rate and Rhythm: Normal rate and regular rhythm.      Pulses: Normal pulses.      Heart sounds: Normal heart sounds.      " Comments: No peripheral edema noted.   Pulmonary:      Effort: Pulmonary effort is normal.      Breath sounds: Normal breath sounds.      Comments: Denies SOB  Musculoskeletal:         General: Swelling present.      Comments: Left lower quadrant hernia noted with swelling.  Burning pain at times over the last 9 days.   Skin:     General: Skin is warm.      Capillary Refill: Capillary refill takes less than 2 seconds.             Comments: Swelling with hernia protruding.    Neurological:      Mental Status: He is alert and oriented to person, place, and time.   Psychiatric:         Behavior: Behavior normal.        Result Review :    Common labs    Common Labs 12/15/22 12/15/22 12/15/22 12/15/22 3/9/23 3/9/23 3/13/23 3/13/23    0632 0632 0632 0632 1534 1534 1332 1332   Glucose   136 (A)   105 (A)  95   BUN   12   14  15   Creatinine   0.93   0.71 (A)  0.71 (A)   Sodium   143   140  138   Potassium   5.2   4.4  4.4   Chloride   104   102  102   Calcium   9.5   9.9  9.5   Albumin   4.70   4.1  4.3   Total Bilirubin   0.5   0.3  0.3   Alkaline Phosphatase   65   58  55   AST (SGOT)   25   23  23   ALT (SGPT)   28   26  21   WBC  12.96 (A)   10.40  9.80    Hemoglobin  15.4   14.9  15.2    Hematocrit  46.9   45.9  45.9    Platelets  207   193  192    Total Cholesterol    123       Triglycerides    71       Hemoglobin A1C 6.1 (A)          (A) Abnormal value                         Assessment and Plan   Diagnoses and all orders for this visit:    1. Left lower quadrant abdominal pain (Primary)  -     Ambulatory Referral to General Surgery    2. Non-recurrent unilateral inguinal hernia without obstruction or gangrene  -     Ambulatory Referral to General Surgery      I have placed an ambulatory referral to general surgery at this time for urgent matters.  Patient is currently taking Augmentin for a round of diverticulitis that was diagnosed on 3-9-2023.  Patient will continue this medication.  Patient is aware if he  develops any worsening symptoms such as worsening burning/abdominal pain with any redness, swelling, fever or chills he will go to the emergency room.  Patient agrees with this treatment plan at this time.       Follow Up   Return if symptoms worsen or fail to improve.  Patient was given instructions and counseling regarding his condition or for health maintenance advice. Please see specific information pulled into the AVS if appropriate.

## 2023-03-16 NOTE — PATIENT INSTRUCTIONS
I have placed an ambulatory referral to general surgery at this time for urgent matters.  Patient is currently taking Augmentin for a round of diverticulitis that was diagnosed on 3-9-2023.  Patient will continue this medication.  Patient is aware if he develops any worsening symptoms such as worsening burning/abdominal pain with any redness, swelling, fever or chills he will go to the emergency room.

## 2023-03-20 ENCOUNTER — OFFICE VISIT (OUTPATIENT)
Dept: SURGERY | Facility: CLINIC | Age: 63
End: 2023-03-20
Payer: COMMERCIAL

## 2023-03-20 VITALS
RESPIRATION RATE: 16 BRPM | HEIGHT: 70 IN | WEIGHT: 168 LBS | DIASTOLIC BLOOD PRESSURE: 58 MMHG | SYSTOLIC BLOOD PRESSURE: 112 MMHG | BODY MASS INDEX: 24.05 KG/M2 | HEART RATE: 60 BPM

## 2023-03-20 DIAGNOSIS — K40.90 LEFT INGUINAL HERNIA: ICD-10-CM

## 2023-03-20 DIAGNOSIS — K42.9 UMBILICAL HERNIA WITHOUT OBSTRUCTION AND WITHOUT GANGRENE: Primary | ICD-10-CM

## 2023-03-20 PROCEDURE — 99204 OFFICE O/P NEW MOD 45 MIN: CPT | Performed by: SURGERY

## 2023-03-20 NOTE — PROGRESS NOTES
Prudencio SOUSA Mckinley 62 y.o. male presents @ the req of JONATHAN Fox for eval of LIH.  Pt reports  He is having a significant amount of pain in LEFT Ing area.  Pt reports he went to ER X 2 and was dx'd with diverticulitis.  Pt has completed all antibiotics but states the pain remains.  He is unable to work and has diff sleeping. Pt states he notes a change in BM with decrease in freq and amount. Pt feels like he needs to have a BM but he is afraid to strain.  Pt has been using stool softener.   Chief Complaint   Patient presents with   • Hernia     LIH             HPI   This very pleasant 62-year-old male was moving a refrigerator on March 6.  He developed severe left lower quadrant abdominal pain.  He went and had a CT scan that showed diverticulitis.  He was treated with antibiotics but his pain never really went away.  He had a repeat CT scan that showed resolution of the diverticulitis but showed an umbilical hernia as well as a left inguinal hernia.  Prudencio tolerates a regular diet without nausea or vomiting.  He moves his bowels well and eats healthy.  He has no chest pain or shortness of breath.  He has no fevers or chills.  He smokes occasionally.      Review of Systems   All other systems reviewed and are negative.            Current Outpatient Medications:   •  Ascorbic Acid (VITAMIN C PO), Take 1 tablet by mouth Daily., Disp: , Rfl:   •  atorvastatin (LIPITOR) 20 MG tablet, TAKE 1 TABLET BY MOUTH DAILY FOR HIGH CHOLESTEROL, Disp: 30 tablet, Rfl: 11  •  Cholecalciferol (VITAMIN D3) 5000 UNITS capsule capsule, Take 1 capsule by mouth Daily., Disp: , Rfl:   •  ezetimibe (ZETIA) 10 MG tablet, TAKE 1 TABLET BY MOUTH DAILY FOR HIGH CHOLESTEROL, Disp: 30 tablet, Rfl: 11  •  levothyroxine (SYNTHROID, LEVOTHROID) 100 MCG tablet, TAKE 1 TABLET BY MOUTH EVERY DAY, Disp: 90 tablet, Rfl: 3  •  magnesium 30 MG tablet, Take 1 tablet by mouth Daily., Disp: , Rfl:   •  multivitamin (THERAGRAN) tablet tablet, Take 1 tablet by  mouth Daily., Disp: , Rfl:   •  SITagliptin-metFORMIN HCl ER (Janumet XR) 100-1000 MG tablet, TAKE 1 TABLET BY MOUTH DAILY WITH FOOD FOR DIABETES, Disp: 30 tablet, Rfl: 11  •  Zinc Sulfate (ZINC 15 PO), Take 1 tablet by mouth Daily., Disp: , Rfl:         No Known Allergies        Past Medical History:   Diagnosis Date   • Diabetes mellitus (HCC) 06/15/2011   • Diverticulitis of colon 03/09/2023   • Former cigarette smoker 06/22/2022   • Hiatal hernia 07/28/2011 07/28/2011--3 cm hiatal hernia, otherwise normal EGD.   • History of coronary angiogram 2010 2010-- patient had a heart catheterization which was normal.   • History of nephrolithiasis Approximately 1985    Approximately 1985--patient passed a kidney stone stone spontaneously.   • History of peptic ulcer Approximately 1979    Approximately 1979--patient was told he had a peptic ulcer and was placed on Mylanta.  It sounds as if he had an EGD.   • Hyperlipidemia 06/22/2012 06/22/2012--treatment for hyperlipidemia begun.   • Hypothyroidism 04/27/2012 05/04/2012--treatment for hypothyroidism begun.  05/01/2012--ultrasound of the thyroid revealed increased hypervascularity, slightly heterogeneous echo.   04/27/2012--initial diagnosis of hypothyroidism.   • Microalbuminuria 05/15/2015    05/15/2015--urine microalbumin slightly elevated at 19.4. Normal range 0.0--17.0.   • Vitamin D deficiency 03/21/2016           Past Surgical History:   Procedure Laterality Date   • COLONOSCOPY  07/28/2011 07/28/2011--normal colonoscopy with an excellent prep.   • ESOPHAGOSCOPY / EGD  07/28/2011 07/28/2011--3 cm hiatal hernia, otherwise normal EGD.   • ROTATOR CUFF REPAIR Left 02/04/2022 February 4, 2022--left rotator cuff surgery.   • TONSILLECTOMY      11 years old.           Social History     Tobacco Use   • Smoking status: Former     Types: Cigars   • Smokeless tobacco: Never   Vaping Use   • Vaping Use: Never used   Substance Use Topics   • Alcohol use:  "Not Currently     Comment: Moderately   • Drug use: No           Immunization History   Administered Date(s) Administered   • COVID-19 (MODERNA) 1st, 2nd, 3rd Dose Only 12/06/2021, 01/09/2022   • FluLaval/Fluzone >6mos 02/22/2020, 12/22/2022   • Influenza, Unspecified 12/22/2022   • Pneumococcal Conjugate 20-Valent (PCV20) 06/22/2022   • Pneumococcal Polysaccharide (PPSV23) 05/09/2018   • Tdap 05/08/2015, 05/09/2018           Physical Exam  Vitals and nursing note reviewed.   Constitutional:       Appearance: Normal appearance.   HENT:      Head: Normocephalic and atraumatic.   Cardiovascular:      Rate and Rhythm: Normal rate and regular rhythm.   Pulmonary:      Effort: Pulmonary effort is normal.      Breath sounds: Normal breath sounds.   Abdominal:      General: Bowel sounds are normal.      Palpations: Abdomen is soft.      Comments: Small umbilical hernia noted   Genitourinary:     Comments: There is an obvious left inguinal hernia on Valsalva and there is a small impulse on the right side on Valsalva  Musculoskeletal:         General: No swelling or tenderness.   Skin:     General: Skin is warm and dry.   Neurological:      General: No focal deficit present.      Mental Status: He is alert and oriented to person, place, and time.   Psychiatric:         Mood and Affect: Mood normal.         Behavior: Behavior normal.         Debilities/Disabilities Identified: None    Emotional Behavior: Appropriate      /58   Pulse 60   Resp 16   Ht 177.8 cm (70\")   Wt 76.2 kg (168 lb)   BMI 24.11 kg/m²         Diagnoses and all orders for this visit:    1. Umbilical hernia without obstruction and without gangrene (Primary)    2. Left inguinal hernia            We discussed performing a laparoscopic left inguinal hernia repair possible bilateral and umbilical hernia repair.  Benefits and risks not limited to but including:  Bleeding, infection, hernia recurrence, testicular atrophy, inguinal pain, numbness to the " leg or scrotal area, anesthesia complications.  Prudencio appeared to understand and is willing to proceed.    Thank you for allowing me to participate in the care of this interesting patient.

## 2023-03-24 ENCOUNTER — PRE-ADMISSION TESTING (OUTPATIENT)
Dept: PREADMISSION TESTING | Facility: HOSPITAL | Age: 63
End: 2023-03-24
Payer: COMMERCIAL

## 2023-03-24 ENCOUNTER — PATIENT ROUNDING (BHMG ONLY) (OUTPATIENT)
Dept: SURGERY | Facility: CLINIC | Age: 63
End: 2023-03-24
Payer: COMMERCIAL

## 2023-03-24 VITALS
OXYGEN SATURATION: 99 % | RESPIRATION RATE: 16 BRPM | DIASTOLIC BLOOD PRESSURE: 80 MMHG | WEIGHT: 166.01 LBS | HEART RATE: 70 BPM | HEIGHT: 70 IN | SYSTOLIC BLOOD PRESSURE: 115 MMHG | BODY MASS INDEX: 23.77 KG/M2

## 2023-03-24 LAB — QT INTERVAL: 371 MS

## 2023-03-24 PROCEDURE — 93010 ELECTROCARDIOGRAM REPORT: CPT | Performed by: INTERNAL MEDICINE

## 2023-03-24 PROCEDURE — 93005 ELECTROCARDIOGRAM TRACING: CPT

## 2023-03-24 RX ORDER — MELATONIN
2000 DAILY
COMMUNITY

## 2023-03-24 RX ORDER — ASCORBIC ACID 500 MG
500 TABLET ORAL DAILY
COMMUNITY

## 2023-03-24 RX ORDER — BISACODYL 5 MG/1
5 TABLET, DELAYED RELEASE ORAL DAILY PRN
COMMUNITY

## 2023-03-24 NOTE — PROGRESS NOTES
March 24, 2023    Hello, may I speak with Prudencio MERRY Mckinley?    My name is Tracie       I am  with [facility]  [address] [place] [address] [phone].    Before we get started may I verify your date of birth? 1960    I am calling to officially welcome you to our practice and ask about your recent visit. Is this a good time to talk? yes    Tell me about your visit with us. What things went well?  Everything was just fine       We're always looking for ways to make our patients' experiences even better. Do you have recommendations on ways we may improve?  no    Overall were you satisfied with your first visit to our practice? yes       I appreciate you taking the time to speak with me today. Is there anything else I can do for you? no      Thank you, and have a great day.

## 2023-03-24 NOTE — DISCHARGE INSTRUCTIONS
PRE-ADMISSION TESTING INSTRUCTIONS FOR ADULTS    Take these medications the morning of surgery with a small sip of water:   atorvastatin, ezetimibe, levothyroxine      Do not take any insulin or diabetes medications the morning of surgery.      No aspirin, advil, aleve, ibuprofen, naproxen, diet pills, decongestants, or herbal/vitamins for a week prior to surgery.       Tylenol/Acetaminophen is okay to take if needed.    General Instructions:    DO NOT EAT SOLID FOOD OR DRINK LIQUIDS AFTER MIDNIGHT THE NIGHT BEFORE SURGERY. No gum, mints, or hard candy after midnight the night before surgery.      Patients who avoid smoking, chewing tobacco and alcohol for 4 weeks prior to surgery have a reduced risk of post-operative complications.  If at all possible, quit smoking as many days before surgery as you can.    Do not smoke, use chewing tobacco or drink alcohol the day of surgery    Bring your C-PAP/ BI-PAP machine if you use one.  Wear clean comfortable clothes.  Do not wear contact lenses, lotion, deodorant, or make-up.  Bring a case for your glasses if applicable. You may brush your teeth the morning of surgery.  You may wear dentures/partials, do not put adhesive/glue on them.  Leave all other jewelry and valuables at home.      Preventing a Surgical Site Infection:    Shower the night before and on the morning of surgery using the chlorhexidine soap you were given.  Use a clean washcloth with the soap.  Place clean sheets on your bed after showering the night before surgery. Do not use the CHG soap on your hair, face, or private areas. Wash your body gently for five (5) minutes. Do not scrub your skin.  Dry with a clean towel and dress in clean clothing.  Do not shave the surgical area for 10 days-2 weeks prior to surgery  because the razor can irritate skin and make it easier to develop an infection.  Make sure you, your family, and all healthcare providers clean their hands with soap and water or an alcohol  based hand  before caring for you or your wound.      Day of surgery:    Your surgeon’s office will advise you of your arrival time for the day of surgery.    Upon arrival, a Pre-op nurse and Anesthesia provider will review your health history, obtain vital signs, and answer questions you may have. The anesthesia provider will also discuss the type of anesthesia that will be needed for your procedure, which may include general anesthesia. The only belongings needed at this time will be your home medications and if applicable your C-PAP/BI-PAP machine.  If you are staying overnight your family can leave the rest of your belongings in the car and bring them to your room later.  A Pre-op nurse will start an IV and you may receive medication in preparation for surgery, including something to help you relax.  Your family will be able to see you in the Pre-op area.  While you are in surgery your family should notify the waiting room  if they leave the waiting room area and provide a contact phone number.    IF you have any questions, you can call the Pre-Admission Department at [phone] or your surgeon's office.  Notify your surgeon if  you become sick, have a fever, productive cough, or cannot be here the day of surgery    Please be aware that surgery does come with discomfort.  We want to make every effort to control your discomfort so please discuss any uncontrolled symptoms with your nurse.   Your doctor will most likely have prescribed pain medications.      If you are going home after surgery, you will receive individualized written care instructions before being discharged.  A responsible adult (over the age of 18) must drive you to and from the hospital on the day of your surgery and stay with you for 24 hours after anesthesia.    If you are staying overnight following surgery, you will be transported to your hospital room following the recovery period.  [facility] has all  private rooms.    You may receive a survey regarding the care you received. Your feedback is very important and will be used to collect the necessary data to help us to continue to provide excellent care.     Deductibles and co-payments are collected on the day of service. Please be prepared to pay the required co-pay, deductible or deposit on the day of service as defined by your plan.

## 2023-03-29 ENCOUNTER — ANESTHESIA EVENT (OUTPATIENT)
Dept: PERIOP | Facility: HOSPITAL | Age: 63
End: 2023-03-29
Payer: COMMERCIAL

## 2023-03-30 ENCOUNTER — HOSPITAL ENCOUNTER (OUTPATIENT)
Facility: HOSPITAL | Age: 63
Setting detail: HOSPITAL OUTPATIENT SURGERY
Discharge: HOME | End: 2023-03-30
Attending: SURGERY | Admitting: SURGERY
Payer: COMMERCIAL

## 2023-03-30 ENCOUNTER — ANESTHESIA (OUTPATIENT)
Dept: PERIOP | Facility: HOSPITAL | Age: 63
End: 2023-03-30
Payer: COMMERCIAL

## 2023-03-30 ENCOUNTER — HOSPITAL ENCOUNTER (OUTPATIENT)
Dept: ULTRASOUND IMAGING | Facility: HOSPITAL | Age: 63
Discharge: HOME | End: 2023-03-30

## 2023-03-30 VITALS
WEIGHT: 164.2 LBS | OXYGEN SATURATION: 97 % | RESPIRATION RATE: 12 BRPM | HEART RATE: 63 BPM | DIASTOLIC BLOOD PRESSURE: 59 MMHG | TEMPERATURE: 97.7 F | BODY MASS INDEX: 23.56 KG/M2 | SYSTOLIC BLOOD PRESSURE: 92 MMHG

## 2023-03-30 DIAGNOSIS — K40.90 LEFT INGUINAL HERNIA: ICD-10-CM

## 2023-03-30 DIAGNOSIS — K42.9 UMBILICAL HERNIA WITHOUT OBSTRUCTION AND WITHOUT GANGRENE: ICD-10-CM

## 2023-03-30 LAB — GLUCOSE BLDC GLUCOMTR-MCNC: 89 MG/DL (ref 70–130)

## 2023-03-30 PROCEDURE — 25010000002 NEOSTIGMINE 10 MG/10ML SOLUTION: Performed by: ANESTHESIOLOGY

## 2023-03-30 PROCEDURE — 82962 GLUCOSE BLOOD TEST: CPT

## 2023-03-30 PROCEDURE — 49650 LAP ING HERNIA REPAIR INIT: CPT | Performed by: SPECIALIST/TECHNOLOGIST, OTHER

## 2023-03-30 PROCEDURE — 49591 RPR AA HRN 1ST < 3 CM RDC: CPT | Performed by: SURGERY

## 2023-03-30 PROCEDURE — 49650 LAP ING HERNIA REPAIR INIT: CPT | Performed by: SURGERY

## 2023-03-30 PROCEDURE — 25010000002 FENTANYL CITRATE (PF) 50 MCG/ML SOLUTION: Performed by: ANESTHESIOLOGY

## 2023-03-30 PROCEDURE — 0 CEFAZOLIN SODIUM-DEXTROSE 2-3 GM-%(50ML) RECONSTITUTED SOLUTION: Performed by: SURGERY

## 2023-03-30 PROCEDURE — 25010000002 EPINEPHRINE (ANAPHYLAXIS) 1 MG/ML SOLUTION: Performed by: ANESTHESIOLOGY

## 2023-03-30 PROCEDURE — 25010000002 MIDAZOLAM PER 1MG: Performed by: NURSE ANESTHETIST, CERTIFIED REGISTERED

## 2023-03-30 PROCEDURE — 25010000002 ONDANSETRON PER 1 MG: Performed by: ANESTHESIOLOGY

## 2023-03-30 PROCEDURE — C1781 MESH (IMPLANTABLE): HCPCS | Performed by: SURGERY

## 2023-03-30 PROCEDURE — 25010000002 ONDANSETRON PER 1 MG: Performed by: NURSE ANESTHETIST, CERTIFIED REGISTERED

## 2023-03-30 PROCEDURE — 25010000002 DEXAMETHASONE PER 1 MG: Performed by: NURSE ANESTHETIST, CERTIFIED REGISTERED

## 2023-03-30 PROCEDURE — 49591 RPR AA HRN 1ST < 3 CM RDC: CPT | Performed by: SPECIALIST/TECHNOLOGIST, OTHER

## 2023-03-30 PROCEDURE — 25010000002 PROPOFOL 10 MG/ML EMULSION: Performed by: ANESTHESIOLOGY

## 2023-03-30 PROCEDURE — 25010000002 KETOROLAC TROMETHAMINE PER 15 MG: Performed by: ANESTHESIOLOGY

## 2023-03-30 PROCEDURE — 25010000002 HYDROMORPHONE 1 MG/ML SOLUTION: Performed by: ANESTHESIOLOGY

## 2023-03-30 DEVICE — VENTRALEX ST HERNIA PATCH
Type: IMPLANTABLE DEVICE | Site: ABDOMEN | Status: FUNCTIONAL
Brand: VENTRALEX ST HERNIA PATCH

## 2023-03-30 DEVICE — HEMOST ABS SURGICEL PWDR 3GM: Type: IMPLANTABLE DEVICE | Site: ABDOMEN | Status: FUNCTIONAL

## 2023-03-30 DEVICE — BARD 3DMAX MESH LEFT LARGE
Type: IMPLANTABLE DEVICE | Site: ABDOMEN | Status: FUNCTIONAL
Brand: BARD 3DMAX MESH

## 2023-03-30 DEVICE — CAPSURE PERMANENT FIXATION SYSTEM 30 PERMANENT FASTENERS
Type: IMPLANTABLE DEVICE | Site: ABDOMEN | Status: FUNCTIONAL
Brand: CAPSURE PERMANENT FIXATION SYSTEM

## 2023-03-30 RX ORDER — ONDANSETRON 2 MG/ML
4 INJECTION INTRAMUSCULAR; INTRAVENOUS ONCE AS NEEDED
Status: COMPLETED | OUTPATIENT
Start: 2023-03-30 | End: 2023-03-30

## 2023-03-30 RX ORDER — SODIUM CHLORIDE, SODIUM LACTATE, POTASSIUM CHLORIDE, CALCIUM CHLORIDE 600; 310; 30; 20 MG/100ML; MG/100ML; MG/100ML; MG/100ML
9 INJECTION, SOLUTION INTRAVENOUS CONTINUOUS
Status: DISCONTINUED | OUTPATIENT
Start: 2023-03-30 | End: 2023-03-30 | Stop reason: HOSPADM

## 2023-03-30 RX ORDER — DEXAMETHASONE SODIUM PHOSPHATE 4 MG/ML
8 INJECTION, SOLUTION INTRA-ARTICULAR; INTRALESIONAL; INTRAMUSCULAR; INTRAVENOUS; SOFT TISSUE ONCE AS NEEDED
Status: COMPLETED | OUTPATIENT
Start: 2023-03-30 | End: 2023-03-30

## 2023-03-30 RX ORDER — SODIUM CHLORIDE 0.9 % (FLUSH) 0.9 %
10 SYRINGE (ML) INJECTION AS NEEDED
Status: DISCONTINUED | OUTPATIENT
Start: 2023-03-30 | End: 2023-03-30 | Stop reason: HOSPADM

## 2023-03-30 RX ORDER — KETOROLAC TROMETHAMINE 30 MG/ML
INJECTION, SOLUTION INTRAMUSCULAR; INTRAVENOUS AS NEEDED
Status: DISCONTINUED | OUTPATIENT
Start: 2023-03-30 | End: 2023-03-30 | Stop reason: SURG

## 2023-03-30 RX ORDER — SODIUM CHLORIDE 9 MG/ML
40 INJECTION, SOLUTION INTRAVENOUS AS NEEDED
Status: DISCONTINUED | OUTPATIENT
Start: 2023-03-30 | End: 2023-03-30 | Stop reason: HOSPADM

## 2023-03-30 RX ORDER — LIDOCAINE HYDROCHLORIDE 10 MG/ML
0.5 INJECTION, SOLUTION EPIDURAL; INFILTRATION; INTRACAUDAL; PERINEURAL ONCE AS NEEDED
Status: DISCONTINUED | OUTPATIENT
Start: 2023-03-30 | End: 2023-03-30 | Stop reason: HOSPADM

## 2023-03-30 RX ORDER — MAGNESIUM HYDROXIDE 1200 MG/15ML
LIQUID ORAL AS NEEDED
Status: DISCONTINUED | OUTPATIENT
Start: 2023-03-30 | End: 2023-03-30 | Stop reason: HOSPADM

## 2023-03-30 RX ORDER — EPINEPHRINE 1 MG/ML
INJECTION, SOLUTION INTRAMUSCULAR; SUBCUTANEOUS AS NEEDED
Status: DISCONTINUED | OUTPATIENT
Start: 2023-03-30 | End: 2023-03-30 | Stop reason: SURG

## 2023-03-30 RX ORDER — HYDROCODONE BITARTRATE AND ACETAMINOPHEN 5; 325 MG/1; MG/1
1 TABLET ORAL EVERY 4 HOURS PRN
Qty: 30 TABLET | Refills: 0 | Status: SHIPPED | OUTPATIENT
Start: 2023-03-30

## 2023-03-30 RX ORDER — SODIUM CHLORIDE, SODIUM LACTATE, POTASSIUM CHLORIDE, CALCIUM CHLORIDE 600; 310; 30; 20 MG/100ML; MG/100ML; MG/100ML; MG/100ML
100 INJECTION, SOLUTION INTRAVENOUS CONTINUOUS
Status: DISCONTINUED | OUTPATIENT
Start: 2023-03-30 | End: 2023-03-30 | Stop reason: HOSPADM

## 2023-03-30 RX ORDER — FENTANYL CITRATE 50 UG/ML
INJECTION, SOLUTION INTRAMUSCULAR; INTRAVENOUS AS NEEDED
Status: DISCONTINUED | OUTPATIENT
Start: 2023-03-30 | End: 2023-03-30 | Stop reason: SURG

## 2023-03-30 RX ORDER — BUPIVACAINE HYDROCHLORIDE 2.5 MG/ML
INJECTION, SOLUTION EPIDURAL; INFILTRATION; INTRACAUDAL
Status: COMPLETED | OUTPATIENT
Start: 2023-03-30 | End: 2023-03-30

## 2023-03-30 RX ORDER — CEFAZOLIN SODIUM 2 G/50ML
2 SOLUTION INTRAVENOUS ONCE
Status: COMPLETED | OUTPATIENT
Start: 2023-03-30 | End: 2023-03-30

## 2023-03-30 RX ORDER — DEXMEDETOMIDINE HYDROCHLORIDE 100 UG/ML
INJECTION, SOLUTION INTRAVENOUS AS NEEDED
Status: DISCONTINUED | OUTPATIENT
Start: 2023-03-30 | End: 2023-03-30 | Stop reason: SURG

## 2023-03-30 RX ORDER — SODIUM CHLORIDE 0.9 % (FLUSH) 0.9 %
10 SYRINGE (ML) INJECTION EVERY 12 HOURS SCHEDULED
Status: DISCONTINUED | OUTPATIENT
Start: 2023-03-30 | End: 2023-03-30 | Stop reason: HOSPADM

## 2023-03-30 RX ORDER — LIDOCAINE HYDROCHLORIDE AND EPINEPHRINE 10; 10 MG/ML; UG/ML
INJECTION, SOLUTION INFILTRATION; PERINEURAL AS NEEDED
Status: DISCONTINUED | OUTPATIENT
Start: 2023-03-30 | End: 2023-03-30 | Stop reason: HOSPADM

## 2023-03-30 RX ORDER — NEOSTIGMINE METHYLSULFATE 1 MG/ML
INJECTION, SOLUTION INTRAVENOUS AS NEEDED
Status: DISCONTINUED | OUTPATIENT
Start: 2023-03-30 | End: 2023-03-30 | Stop reason: SURG

## 2023-03-30 RX ORDER — FAMOTIDINE 10 MG/ML
20 INJECTION, SOLUTION INTRAVENOUS
Status: COMPLETED | OUTPATIENT
Start: 2023-03-30 | End: 2023-03-30

## 2023-03-30 RX ORDER — PROPOFOL 10 MG/ML
VIAL (ML) INTRAVENOUS AS NEEDED
Status: DISCONTINUED | OUTPATIENT
Start: 2023-03-30 | End: 2023-03-30 | Stop reason: SURG

## 2023-03-30 RX ORDER — ROCURONIUM BROMIDE 10 MG/ML
INJECTION, SOLUTION INTRAVENOUS AS NEEDED
Status: DISCONTINUED | OUTPATIENT
Start: 2023-03-30 | End: 2023-03-30 | Stop reason: SURG

## 2023-03-30 RX ORDER — OXYCODONE HYDROCHLORIDE AND ACETAMINOPHEN 5; 325 MG/1; MG/1
1 TABLET ORAL ONCE AS NEEDED
Status: COMPLETED | OUTPATIENT
Start: 2023-03-30 | End: 2023-03-30

## 2023-03-30 RX ORDER — BUPIVACAINE HYDROCHLORIDE 5 MG/ML
INJECTION, SOLUTION EPIDURAL; INTRACAUDAL AS NEEDED
Status: DISCONTINUED | OUTPATIENT
Start: 2023-03-30 | End: 2023-03-30 | Stop reason: HOSPADM

## 2023-03-30 RX ORDER — MIDAZOLAM HYDROCHLORIDE 2 MG/2ML
1 INJECTION, SOLUTION INTRAMUSCULAR; INTRAVENOUS
Status: DISCONTINUED | OUTPATIENT
Start: 2023-03-30 | End: 2023-03-30 | Stop reason: HOSPADM

## 2023-03-30 RX ORDER — KETAMINE HYDROCHLORIDE 10 MG/ML
INJECTION INTRAMUSCULAR; INTRAVENOUS AS NEEDED
Status: DISCONTINUED | OUTPATIENT
Start: 2023-03-30 | End: 2023-03-30 | Stop reason: SURG

## 2023-03-30 RX ORDER — GLYCOPYRROLATE 0.2 MG/ML
INJECTION INTRAMUSCULAR; INTRAVENOUS AS NEEDED
Status: DISCONTINUED | OUTPATIENT
Start: 2023-03-30 | End: 2023-03-30 | Stop reason: SURG

## 2023-03-30 RX ADMIN — MIDAZOLAM HYDROCHLORIDE 1 MG: 1 INJECTION, SOLUTION INTRAMUSCULAR; INTRAVENOUS at 11:36

## 2023-03-30 RX ADMIN — NEOSTIGMINE METHYLSULFATE 3 MG: 0.5 INJECTION INTRAVENOUS at 12:51

## 2023-03-30 RX ADMIN — CEFAZOLIN SODIUM 2 G: 2 SOLUTION INTRAVENOUS at 11:50

## 2023-03-30 RX ADMIN — EPINEPHRINE 200 MCG: 1 INJECTION INTRAMUSCULAR; INTRAVENOUS; SUBCUTANEOUS at 11:56

## 2023-03-30 RX ADMIN — ROCURONIUM BROMIDE 10 MG: 10 INJECTION, SOLUTION INTRAVENOUS at 12:21

## 2023-03-30 RX ADMIN — ROCURONIUM BROMIDE 20 MG: 10 INJECTION, SOLUTION INTRAVENOUS at 11:46

## 2023-03-30 RX ADMIN — OXYCODONE HYDROCHLORIDE AND ACETAMINOPHEN 1 TABLET: 5; 325 TABLET ORAL at 14:06

## 2023-03-30 RX ADMIN — ROCURONIUM BROMIDE 5 MG: 10 INJECTION, SOLUTION INTRAVENOUS at 11:44

## 2023-03-30 RX ADMIN — DEXMEDETOMIDINE 40 MCG: 100 INJECTION, SOLUTION, CONCENTRATE INTRAVENOUS at 11:56

## 2023-03-30 RX ADMIN — FENTANYL CITRATE 25 MCG: 50 INJECTION, SOLUTION INTRAMUSCULAR; INTRAVENOUS at 11:46

## 2023-03-30 RX ADMIN — FENTANYL CITRATE 25 MCG: 50 INJECTION, SOLUTION INTRAMUSCULAR; INTRAVENOUS at 12:33

## 2023-03-30 RX ADMIN — FAMOTIDINE 20 MG: 10 INJECTION, SOLUTION INTRAVENOUS at 10:48

## 2023-03-30 RX ADMIN — SODIUM CHLORIDE, POTASSIUM CHLORIDE, SODIUM LACTATE AND CALCIUM CHLORIDE 9 ML/HR: 600; 310; 30; 20 INJECTION, SOLUTION INTRAVENOUS at 10:30

## 2023-03-30 RX ADMIN — ONDANSETRON 4 MG: 2 INJECTION INTRAMUSCULAR; INTRAVENOUS at 13:36

## 2023-03-30 RX ADMIN — KETAMINE HYDROCHLORIDE 20 MG: 10 INJECTION INTRAMUSCULAR; INTRAVENOUS at 11:48

## 2023-03-30 RX ADMIN — DEXAMETHASONE SODIUM PHOSPHATE 8 MG: 4 INJECTION, SOLUTION INTRAMUSCULAR; INTRAVENOUS at 10:49

## 2023-03-30 RX ADMIN — BUPIVACAINE HYDROCHLORIDE 60 ML: 2.5 INJECTION, SOLUTION EPIDURAL; INFILTRATION; INTRACAUDAL; PERINEURAL at 11:55

## 2023-03-30 RX ADMIN — HYDROMORPHONE HYDROCHLORIDE 1 MG: 1 INJECTION, SOLUTION INTRAMUSCULAR; INTRAVENOUS; SUBCUTANEOUS at 13:35

## 2023-03-30 RX ADMIN — ONDANSETRON 4 MG: 2 INJECTION INTRAMUSCULAR; INTRAVENOUS at 10:48

## 2023-03-30 RX ADMIN — GLYCOPYRROLATE 0.2 MG: 0.2 INJECTION INTRAMUSCULAR; INTRAVENOUS at 11:53

## 2023-03-30 RX ADMIN — GLYCOPYRROLATE 0.4 MG: 0.2 INJECTION INTRAMUSCULAR; INTRAVENOUS at 12:51

## 2023-03-30 RX ADMIN — KETOROLAC TROMETHAMINE 30 MG: 30 INJECTION, SOLUTION INTRAMUSCULAR; INTRAVENOUS at 12:48

## 2023-03-30 RX ADMIN — PROPOFOL 200 MG: 10 INJECTION, EMULSION INTRAVENOUS at 11:46

## 2023-03-30 NOTE — ANESTHESIA PROCEDURE NOTES
Airway  Urgency: elective    Date/Time: 3/30/2023 11:48 AM  Airway not difficult    General Information and Staff    Patient location during procedure: OR  Anesthesiologist: Ying Campoverde MD    Indications and Patient Condition  Indications for airway management: airway protection    Preoxygenated: yes  MILS maintained throughout  Mask difficulty assessment: 1 - vent by mask    Final Airway Details  Final airway type: endotracheal airway      Successful airway: ETT  Cuffed: yes   Successful intubation technique: direct laryngoscopy  Facilitating devices/methods: cricoid pressure and anterior pressure/BURP  Endotracheal tube insertion site: oral  Blade: Leyva  Blade size: 3  ETT size (mm): 7.5  Cormack-Lehane Classification: grade IIa - partial view of glottis  Placement verified by: chest auscultation and capnometry   Cuff volume (mL): 7  Measured from: lips  ETT/EBT  to lips (cm): 22  Number of attempts at approach: 1  Assessment: lips, teeth, and gum same as pre-op and atraumatic intubation

## 2023-03-30 NOTE — ANESTHESIA POSTPROCEDURE EVALUATION
Patient: Prudencio Mckinley    Procedure Summary     Date: 03/30/23 Room / Location:  [facility] OR 1 /  [facility]    Anesthesia Start: 1142 Anesthesia Stop: 1311    Procedures:       laparoscopic left inguinal hernia repair possible bilateral and umbilical hernia repair (Left: Abdomen)      UMBILICAL HERNIA REPAIR (Abdomen) Diagnosis:       Umbilical hernia without obstruction and without gangrene      Left inguinal hernia      (Umbilical hernia without obstruction and without gangrene [K42.9])      (Left inguinal hernia [K40.90])    Surgeons: Kelsy Bejarano DO Provider: Ying Campoverde MD    Anesthesia Type: general with block ASA Status: 2          Anesthesia Type: general with block    Vitals  Vitals Value Taken Time   BP 90/54 03/30/23 1355   Temp     Pulse 65 03/30/23 1357   Resp 11 03/30/23 1355   SpO2 94 % 03/30/23 1357   Vitals shown include unvalidated device data.        Post Anesthesia Care and Evaluation    Patient location during evaluation: PHASE II  Patient participation: complete - patient participated  Level of consciousness: awake and alert  Pain score: 0  Pain management: adequate    Airway patency: patent  Anesthetic complications: No anesthetic complications  PONV Status: none  Cardiovascular status: acceptable  Respiratory status: acceptable  Hydration status: acceptable

## 2023-03-30 NOTE — ANESTHESIA PREPROCEDURE EVALUATION
Anesthesia Evaluation     Patient summary reviewed and Nursing notes reviewed   no history of anesthetic complications:  NPO Solid Status: > 8 hours  NPO Liquid Status: > 8 hours           Airway   Mallampati: I  TM distance: >3 FB  Neck ROM: full  No difficulty expected  Dental - normal exam     Pulmonary - negative pulmonary ROS and normal exam    breath sounds clear to auscultation  Cardiovascular - normal exam  Exercise tolerance: good (4-7 METS)    ECG reviewed  Rhythm: regular  Rate: normal    (+) hyperlipidemia,     ROS comment: EKG 3/24/23:  - ABNORMAL ECG -  Sinus rhythm  Incomplete RBBB and LAFB  No change from prior tracing  Electronically Signed By: Erika Spann ([facility]) 24-Mar-2023 12:12:57    STRESS TEST 1/1/22:  Interpretation Summary:  Left ventricular ejection fraction is normal. (Calculated EF = 60%).  Myocardial perfusion imaging indicates a normal myocardial perfusion study with no evidence of ischemia.  Impressions are consistent with a low risk study.  There is no prior study available for comparison.  Low risk for ischemic heart disease.  Findings consistent with a normal ECG stress test.    Neuro/Psych- negative ROS  GI/Hepatic/Renal/Endo    (+)  hiatal hernia,  diabetes mellitus type 2, thyroid problem hypothyroidism    Musculoskeletal (-) negative ROS    Abdominal  - normal exam   Substance History - negative use     OB/GYN          Other - negative ROS           Phys Exam Other: SB 51              Anesthesia Plan    ASA 2     general with block     intravenous induction     Anesthetic plan, risks, benefits, and alternatives have been provided, discussed and informed consent has been obtained with: patient.    Use of blood products discussed with patient  Consented to blood products.       CODE STATUS:

## 2023-03-30 NOTE — OP NOTE
GENERAL SURGERY :  Darci Mckinley  1960    Procedure Date: 03/30/23    Pre-op Diagnosis: Umbilical hernia and left inguinal hernia    Post-op Diagnosis: Umbilical hernia and left inguinal hernia    Procedure: Laparoscopic repair of left inguinal hernia and umbilical hernia    Surgeon: Darci    Assistant: Rupali Fenton CSA was responsible for performing the following activities: Retraction, Closing, Placing Dressing and Held/Positioned Camera and their skilled assistance was necessary for the success of this case.      Estimated Blood Loss:  10 ml    Complications: None    Specimen: None    Findings: Prudencio had a 2 cm sized umbilical hernia and a left inguinal hernia.  There was no hernia noted on the right side.    Clinical Note: Prudencio presented to me with the aforementioned complaints.  I discussed with him the benefits risks of a laparoscopic inguinal hernia and umbilical hernia repair.  Benefits and risks not limited to including: Bleeding, infection, hernia recurrence, hernia port site, injury to internal structures, swelling of the testicles and scrotum, pain, anesthesia complications.  He appeared to understand and signed informed sent.    Procedure: Prudencio was taken to the operating room and placed under general anesthetic.  He then had blocks performed by anesthesia under ultrasound guidance.  He was then prepped and draped in the sterile fashion.  After appropriate timeout was performed local was injected above the iliacus and incision was made and dissection was carried down until reached the umbilical hernia defect.  Using Bovie cautery the hernia sac was  from the umbilical stalk and the umbilical stalk was  from the fascia.  A 12 mm on trocar was introduced into that defect and the abdomen is inflated.  A camera was inserted and there were no injuries noted.  There was a left inguinal hernia noted but no right inguinal hernia noted.  Local was injected in the right  lower quadrant and a 5 mm port was placed into that incision.  Local was injected left lower quadrant and an incision was made a varies needle was placed into that opening and was inserted to help separate the peritoneum from the abdominal wall.  The varies needle was removed and a 5 mm port was placed into that opening.  Then using laparoscopic scissors the peritoneum and posterior fascia were opened we started this medially and extended laterally.  Then using blunt dissection the peritoneum was  from the abdominal wall until we reached the pubic tubercles medially.  We then opened the space laterally.  Then using blunt dissection the hernia sac was taken down from the spermatic cord.  Once the hernia sac was adequately reduced we introduced a large 3D max mesh for the left side.  We open this over the defect.  We placed several tacks on Rodolfo's ligament medially.  There was excellent coverage of the hernia defect.  We were able to cover several centimeters below the hernia.  We then placed 1 tack laterally.  The mesh laid flat and we then covered it with the peritoneum.  Once there was excellent coverage of the mesh from the peritoneum we took our attention to the umbilical hernia defect.  Under vision with a 5 mm camera we placed a large Ventralex ST mesh into the Painter port and remove the Painter port.  The mesh laid flat against the abdominal wall.  The 5 mm ports were removed.  The hernia defect was then closed using 0 Nurolon simple sutures.  Copious irrigation was carried out.  The wings of the mesh were sutured to the abdominal wall using 3-0 Vicryl suture.  The umbilical stalk was returned to the fascia using 3-0 Vicryl suture.  The incision was then closed in a layered fashion.  The 5 mm port sites incisions were closed.  Exofin was placed on each incision and sterile dressings were applied.  Prudencio then had his anesthesia reversed and he was extubated and brought to the recovery room in stable  postoperative condition having tolerated his procedure well        Kelsy Bejarano, DO  12:53 EDT

## 2023-03-30 NOTE — ANESTHESIA PROCEDURE NOTES
Peripheral Block    Pre-sedation assessment completed: 3/30/2023 11:54 AM    Patient reassessed immediately prior to procedure    Patient location during procedure: OR  Start time: 3/30/2023 11:55 AM  Stop time: 3/30/2023 11:58 AM  Reason for block: at surgeon's request and post-op pain management  Performed by  Anesthesiologist: Ying Campoverde MD  Preanesthetic Checklist  Completed: patient identified, IV checked, site marked, risks and benefits discussed, surgical consent, monitors and equipment checked, pre-op evaluation and timeout performed  Prep:  Pt Position: supine  Sterile barriers:alcohol skin prep  Prep: ChloraPrep  Patient monitoring: blood pressure monitoring, continuous pulse oximetry and EKG  Procedure    Sedation: no  Performed under: general  Guidance:ultrasound guided  Images:still images obtained, printed/placed on chart    Laterality:Bilateral  Block Type:rectus sheath  Injection Technique:single-shot  Needle Type:echogenic  Needle Gauge:21 G      Medications Used: bupivacaine PF (MARCAINE) injection 0.25% - Injection   60 mL - 3/30/2023 11:55:00 AM      Medications  Comment:Precedex 20 mcg and 100 mcg epi added to each block.    Post Assessment  Injection Assessment: negative aspiration for heme, no paresthesia on injection and incremental injection  Patient Tolerance:comfortable throughout block  Complications:no

## 2023-04-10 ENCOUNTER — OFFICE VISIT (OUTPATIENT)
Dept: SURGERY | Facility: CLINIC | Age: 63
End: 2023-04-10
Payer: COMMERCIAL

## 2023-04-10 DIAGNOSIS — Z87.19 STATUS POST INGUINAL HERNIA REPAIR: Primary | ICD-10-CM

## 2023-04-10 DIAGNOSIS — Z98.890 STATUS POST INGUINAL HERNIA REPAIR: Primary | ICD-10-CM

## 2023-04-10 NOTE — PROGRESS NOTES
Prudencio SOUSA Mckinley 62 y.o. male presents for PO FU LIH repair.  Pt feels well.  + food/fld intake, + bowel/bladder func w/o diff.        HPI   Above noted and agree.      Review of Systems        Past Medical History:   Diagnosis Date   • Diabetes mellitus 06/15/2011   • Diverticulitis of colon 03/09/2023   • Former cigarette smoker 06/22/2022   • Hiatal hernia 07/28/2011 07/28/2011--3 cm hiatal hernia, otherwise normal EGD.   • History of coronary angiogram 2010 2010-- patient had a heart catheterization which was normal.   • History of nephrolithiasis Approximately 1985    Approximately 1985--patient passed a kidney stone stone spontaneously.   • History of peptic ulcer Approximately 1979    Approximately 1979--patient was told he had a peptic ulcer and was placed on Mylanta.  It sounds as if he had an EGD.   • Hyperlipidemia 06/22/2012 06/22/2012--treatment for hyperlipidemia begun.   • Hypothyroidism 04/27/2012 05/04/2012--treatment for hypothyroidism begun.  05/01/2012--ultrasound of the thyroid revealed increased hypervascularity, slightly heterogeneous echo.   04/27/2012--initial diagnosis of hypothyroidism.   • Microalbuminuria 05/15/2015    05/15/2015--urine microalbumin slightly elevated at 19.4. Normal range 0.0--17.0.   • Vitamin D deficiency 03/21/2016           Past Surgical History:   Procedure Laterality Date   • CARDIAC CATHETERIZATION  2010   • COLONOSCOPY  07/28/2011 07/28/2011--normal colonoscopy with an excellent prep.   • ESOPHAGOSCOPY / EGD  07/28/2011 07/28/2011--3 cm hiatal hernia, otherwise normal EGD.   • INGUINAL HERNIA REPAIR Left 3/30/2023    Procedure: laparoscopic left inguinal hernia repair possible bilateral and umbilical hernia repair;  Surgeon: Kelsy Bejarano DO;  Location: [facility] OR;  Service: General;  Laterality: Left;   • ROTATOR CUFF REPAIR Left 02/04/2022 February 4, 2022--left rotator cuff surgery.   • TONSILLECTOMY      11 years old.   • UMBILICAL  HERNIA REPAIR N/A 3/30/2023    Procedure: UMBILICAL HERNIA REPAIR;  Surgeon: Kelsy Bejarano DO;  Location: [facility];  Service: General;  Laterality: N/A;           Physical Exam    General:  Awake and alert with no acute distress  Eyes:  No icterus  Abdomen:  Soft, non-tender  Incision:  Clean, dry, intact    There were no vitals taken for this visit.        Diagnoses and all orders for this visit:    1. Status post inguinal hernia repair (Primary)    Prudencio may resume activity.    Thank you for allowing me to participate in the care of this interesting patient.

## 2023-04-10 NOTE — LETTER
April 10, 2023     Lj Wong MD  [address] [facility]  [address]  [place] [id number]    Patient: Prudencio Mckinley   YOB: 1960   Date of Visit: 4/10/2023       Dear Dr. Marvin MD:    Thank you for referring Prudencio Mckinley to me for evaluation. Below are the relevant portions of my assessment and plan of care.    If you have questions, please do not hesitate to call me. I look forward to following Prudencio along with you.         Sincerely,        Kelsy Bejarano DO        CC: No Recipients    Kelsy Bejarano DO  04/10/23 0922  Sign when Signing Visit  Prudencio Mckinley 62 y.o. male presents for PO FU LIH repair.  Pt feels well.  + food/fld intake, + bowel/bladder func w/o diff.        HPI   Above noted and agree.      Review of Systems        Past Medical History:   Diagnosis Date   • Diabetes mellitus 06/15/2011   • Diverticulitis of colon 03/09/2023   • Former cigarette smoker 06/22/2022   • Hiatal hernia 07/28/2011 07/28/2011--3 cm hiatal hernia, otherwise normal EGD.   • History of coronary angiogram 2010 2010-- patient had a heart catheterization which was normal.   • History of nephrolithiasis Approximately 1985    Approximately 1985--patient passed a kidney stone stone spontaneously.   • History of peptic ulcer Approximately 1979    Approximately 1979--patient was told he had a peptic ulcer and was placed on Mylanta.  It sounds as if he had an EGD.   • Hyperlipidemia 06/22/2012 06/22/2012--treatment for hyperlipidemia begun.   • Hypothyroidism 04/27/2012 05/04/2012--treatment for hypothyroidism begun.  05/01/2012--ultrasound of the thyroid revealed increased hypervascularity, slightly heterogeneous echo.   04/27/2012--initial diagnosis of hypothyroidism.   • Microalbuminuria 05/15/2015    05/15/2015--urine microalbumin slightly elevated at 19.4. Normal range 0.0--17.0.   • Vitamin D deficiency 03/21/2016           Past Surgical History:   Procedure Laterality Date   • CARDIAC  CATHETERIZATION  2010   • COLONOSCOPY  07/28/2011 07/28/2011--normal colonoscopy with an excellent prep.   • ESOPHAGOSCOPY / EGD  07/28/2011 07/28/2011--3 cm hiatal hernia, otherwise normal EGD.   • INGUINAL HERNIA REPAIR Left 3/30/2023    Procedure: laparoscopic left inguinal hernia repair possible bilateral and umbilical hernia repair;  Surgeon: Kelsy Bejarano DO;  Location:  [facility] OR;  Service: General;  Laterality: Left;   • ROTATOR CUFF REPAIR Left 02/04/2022 February 4, 2022--left rotator cuff surgery.   • TONSILLECTOMY      11 years old.   • UMBILICAL HERNIA REPAIR N/A 3/30/2023    Procedure: UMBILICAL HERNIA REPAIR;  Surgeon: Kelsy Bejarano DO;  Location:  [facility] OR;  Service: General;  Laterality: N/A;           Physical Exam    General:  Awake and alert with no acute distress  Eyes:  No icterus  Abdomen:  Soft, non-tender  Incision:  Clean, dry, intact    There were no vitals taken for this visit.        Diagnoses and all orders for this visit:    1. Status post inguinal hernia repair (Primary)    Prudencio may resume activity.    Thank you for allowing me to participate in the care of this interesting patient.

## 2023-04-28 DIAGNOSIS — E03.9 PRIMARY HYPOTHYROIDISM: Chronic | ICD-10-CM

## 2023-05-01 RX ORDER — LEVOTHYROXINE SODIUM 0.1 MG/1
TABLET ORAL
Qty: 90 TABLET | Refills: 3 | Status: SHIPPED | OUTPATIENT
Start: 2023-05-01

## 2023-05-16 DIAGNOSIS — E11.29 TYPE 2 DIABETES MELLITUS WITH MICROALBUMINURIA, WITHOUT LONG-TERM CURRENT USE OF INSULIN: Chronic | ICD-10-CM

## 2023-05-16 DIAGNOSIS — R80.9 TYPE 2 DIABETES MELLITUS WITH MICROALBUMINURIA, WITHOUT LONG-TERM CURRENT USE OF INSULIN: Chronic | ICD-10-CM

## 2023-05-18 DIAGNOSIS — E11.29 TYPE 2 DIABETES MELLITUS WITH MICROALBUMINURIA, WITHOUT LONG-TERM CURRENT USE OF INSULIN: Chronic | ICD-10-CM

## 2023-05-18 DIAGNOSIS — R80.9 TYPE 2 DIABETES MELLITUS WITH MICROALBUMINURIA, WITHOUT LONG-TERM CURRENT USE OF INSULIN: Chronic | ICD-10-CM

## 2023-05-18 RX ORDER — SITAGLIPTIN AND METFORMIN HYDROCHLORIDE 1000; 100 MG/1; MG/1
TABLET, FILM COATED, EXTENDED RELEASE ORAL
Qty: 30 TABLET | Refills: 11 | OUTPATIENT
Start: 2023-05-18

## 2023-05-19 RX ORDER — SITAGLIPTIN AND METFORMIN HYDROCHLORIDE 1000; 100 MG/1; MG/1
1 TABLET, FILM COATED, EXTENDED RELEASE ORAL DAILY
Qty: 30 TABLET | Refills: 2 | Status: SHIPPED | OUTPATIENT
Start: 2023-05-19

## 2023-06-15 ENCOUNTER — LAB (OUTPATIENT)
Dept: LAB | Facility: HOSPITAL | Age: 63
End: 2023-06-15
Payer: COMMERCIAL

## 2023-06-15 PROCEDURE — 83036 HEMOGLOBIN GLYCOSYLATED A1C: CPT | Performed by: INTERNAL MEDICINE

## 2023-06-15 PROCEDURE — 82043 UR ALBUMIN QUANTITATIVE: CPT | Performed by: INTERNAL MEDICINE

## 2023-06-15 PROCEDURE — 80050 GENERAL HEALTH PANEL: CPT | Performed by: INTERNAL MEDICINE

## 2023-06-15 PROCEDURE — 82570 ASSAY OF URINE CREATININE: CPT | Performed by: INTERNAL MEDICINE

## 2023-06-15 PROCEDURE — 84153 ASSAY OF PSA TOTAL: CPT | Performed by: INTERNAL MEDICINE

## 2023-06-15 PROCEDURE — 84481 FREE ASSAY (FT-3): CPT | Performed by: INTERNAL MEDICINE

## 2023-06-15 PROCEDURE — 84439 ASSAY OF FREE THYROXINE: CPT | Performed by: INTERNAL MEDICINE

## 2023-06-15 PROCEDURE — 80061 LIPID PANEL: CPT | Performed by: INTERNAL MEDICINE

## 2023-06-15 PROCEDURE — 82550 ASSAY OF CK (CPK): CPT | Performed by: INTERNAL MEDICINE

## 2023-06-15 PROCEDURE — 82306 VITAMIN D 25 HYDROXY: CPT | Performed by: INTERNAL MEDICINE

## 2023-06-15 PROCEDURE — 81003 URINALYSIS AUTO W/O SCOPE: CPT | Performed by: INTERNAL MEDICINE

## 2023-06-15 PROCEDURE — 83704 LIPOPROTEIN BLD QUAN PART: CPT | Performed by: INTERNAL MEDICINE

## 2023-08-16 DIAGNOSIS — E11.29 TYPE 2 DIABETES MELLITUS WITH MICROALBUMINURIA, WITHOUT LONG-TERM CURRENT USE OF INSULIN: Chronic | ICD-10-CM

## 2023-08-16 DIAGNOSIS — R80.9 TYPE 2 DIABETES MELLITUS WITH MICROALBUMINURIA, WITHOUT LONG-TERM CURRENT USE OF INSULIN: Chronic | ICD-10-CM

## 2023-08-17 RX ORDER — SITAGLIPTIN AND METFORMIN HYDROCHLORIDE 1000; 100 MG/1; MG/1
TABLET, FILM COATED, EXTENDED RELEASE ORAL
Qty: 30 TABLET | Refills: 2 | Status: SHIPPED | OUTPATIENT
Start: 2023-08-17

## 2023-09-29 ENCOUNTER — OFFICE VISIT (OUTPATIENT)
Dept: INTERNAL MEDICINE | Facility: CLINIC | Age: 63
End: 2023-09-29
Payer: COMMERCIAL

## 2023-09-29 VITALS
BODY MASS INDEX: 25.31 KG/M2 | SYSTOLIC BLOOD PRESSURE: 120 MMHG | HEART RATE: 69 BPM | OXYGEN SATURATION: 96 % | HEIGHT: 70 IN | TEMPERATURE: 98.2 F | DIASTOLIC BLOOD PRESSURE: 62 MMHG | WEIGHT: 176.8 LBS

## 2023-09-29 DIAGNOSIS — Z87.891 FORMER CIGARETTE SMOKER: Chronic | ICD-10-CM

## 2023-09-29 DIAGNOSIS — Z12.11 COLON CANCER SCREENING: ICD-10-CM

## 2023-09-29 DIAGNOSIS — E03.9 PRIMARY HYPOTHYROIDISM: Chronic | ICD-10-CM

## 2023-09-29 DIAGNOSIS — R80.9 MICROALBUMINURIA: Chronic | ICD-10-CM

## 2023-09-29 DIAGNOSIS — E78.2 MIXED HYPERLIPIDEMIA: Chronic | ICD-10-CM

## 2023-09-29 DIAGNOSIS — E11.9 ENCOUNTER FOR DIABETIC FOOT EXAM: Chronic | ICD-10-CM

## 2023-09-29 DIAGNOSIS — D72.828 OTHER ELEVATED WHITE BLOOD CELL COUNT: ICD-10-CM

## 2023-09-29 DIAGNOSIS — R80.9 TYPE 2 DIABETES MELLITUS WITH MICROALBUMINURIA, WITHOUT LONG-TERM CURRENT USE OF INSULIN: Chronic | ICD-10-CM

## 2023-09-29 DIAGNOSIS — E55.9 VITAMIN D DEFICIENCY: Chronic | ICD-10-CM

## 2023-09-29 DIAGNOSIS — Z00.00 ROUTINE PHYSICAL EXAMINATION: Primary | ICD-10-CM

## 2023-09-29 DIAGNOSIS — E11.29 TYPE 2 DIABETES MELLITUS WITH MICROALBUMINURIA, WITHOUT LONG-TERM CURRENT USE OF INSULIN: Chronic | ICD-10-CM

## 2023-09-29 PROBLEM — Z87.19 HISTORY OF DIVERTICULITIS OF COLON: Status: RESOLVED | Noted: 2023-09-29 | Resolved: 2023-09-29

## 2023-09-29 PROBLEM — Z87.19 HISTORY OF DIVERTICULITIS OF COLON: Status: ACTIVE | Noted: 2023-09-29

## 2023-09-29 RX ORDER — ATORVASTATIN CALCIUM 20 MG/1
TABLET, FILM COATED ORAL
Qty: 90 TABLET | Refills: 3 | Status: SHIPPED | OUTPATIENT
Start: 2023-09-29

## 2023-09-29 RX ORDER — EZETIMIBE 10 MG/1
TABLET ORAL
Qty: 30 TABLET | Refills: 11
Start: 2023-09-29 | End: 2023-09-29 | Stop reason: SDUPTHER

## 2023-09-29 RX ORDER — ATORVASTATIN CALCIUM 20 MG/1
TABLET, FILM COATED ORAL
Qty: 30 TABLET | Refills: 11
Start: 2023-09-29 | End: 2023-09-29 | Stop reason: SDUPTHER

## 2023-09-29 RX ORDER — LEVOTHYROXINE SODIUM 0.1 MG/1
TABLET ORAL
Qty: 90 TABLET | Refills: 3
Start: 2023-09-29

## 2023-09-29 RX ORDER — SITAGLIPTIN AND METFORMIN HYDROCHLORIDE 1000; 100 MG/1; MG/1
TABLET, FILM COATED, EXTENDED RELEASE ORAL
Qty: 90 TABLET | Refills: 3 | Status: SHIPPED | OUTPATIENT
Start: 2023-09-29

## 2023-09-29 RX ORDER — EZETIMIBE 10 MG/1
TABLET ORAL
Qty: 90 TABLET | Refills: 3 | Status: SHIPPED | OUTPATIENT
Start: 2023-09-29

## 2023-09-29 NOTE — PROGRESS NOTES
09/29/2023    Patient Information  Prudencio Mckinley                                                                                          [address]  [place] [address]      1960  [unfilled]  [phone] (work)    Chief Complaint:         History of Present Illness:      ROS    Active Problems:    Patient Active Problem List   Diagnosis    Hiatal hernia    Hyperlipidemia    Primary hypothyroidism    Microalbuminuria    Type 2 diabetes mellitus with microalbuminuria, without long-term current use of insulin    Vitamin D deficiency    Therapeutic drug monitoring    Routine physical examination    Diabetic foot exam    Diabetic eye exam    Former cigarette smoker    Other elevated white blood cell count         Past Medical History:   Diagnosis Date    Former cigarette smoker 06/22/2022    Hiatal hernia 07/28/2011 07/28/2011--3 cm hiatal hernia, otherwise normal EGD.    History of diverticulitis of colon 09/29/2023 March 2023--presented with left lower quadrant pain.  CT scan consistent with diverticulitis.  Rated successfully with antibiotics.    History of nephrolithiasis Approximately 1985    Approximately 1985--patient passed a kidney stone stone spontaneously.    History of peptic ulcer Approximately 1979    Approximately 1979--patient was told he had a peptic ulcer and was placed on Mylanta.  It sounds as if he had an EGD.    Hyperlipidemia 06/22/2012 06/22/2012--treatment for hyperlipidemia begun.    Hypothyroidism 04/27/2012 05/04/2012--treatment for hypothyroidism begun.  05/01/2012--ultrasound of the thyroid revealed increased hypervascularity, slightly heterogeneous echo.   04/27/2012--initial diagnosis of hypothyroidism.    Microalbuminuria 05/15/2015    05/15/2015--urine microalbumin slightly elevated at 19.4. Normal range 0.0--17.0.    Primary hypothyroidism 04/27/2012 05/04/2012--treatment for hypothyroidism begun.     05/01/2012--ultrasound of the thyroid  revealed increased hypervascularity, slightly heterogeneous echo.      04/27/2012--initial diagnosis of hypothyroidism.    Type 2 diabetes mellitus with microalbuminuria, without long-term current use of insulin 04/01/2011    05/10/2013--initial diagnosis of mild diabetes. Hemoglobin A1c 6.6.     04/01/2011--mild impaired fasting glucose.           Vitamin D deficiency 03/21/2016         Past Surgical History:   Procedure Laterality Date    CARDIAC CATHETERIZATION  2010    COLONOSCOPY  07/28/2011 07/28/2011--normal colonoscopy with an excellent prep.    ESOPHAGOSCOPY / EGD  07/28/2011 07/28/2011--3 cm hiatal hernia, otherwise normal EGD.    INGUINAL HERNIA REPAIR Left 3/30/2023    Procedure: laparoscopic left inguinal hernia repair possible bilateral and umbilical hernia repair;  Surgeon: Kelsy Bejarano DO;  Location: [facility] OR;  Service: General;  Laterality: Left;    ROTATOR CUFF REPAIR Left 02/04/2022 February 4, 2022--left rotator cuff surgery.    TONSILLECTOMY      11 years old.    UMBILICAL HERNIA REPAIR N/A 3/30/2023    Procedure: UMBILICAL HERNIA REPAIR;  Surgeon: Kelsy Bejarano DO;  Location: [facility] OR;  Service: General;  Laterality: N/A;         No Known Allergies        Current Outpatient Medications:     atorvastatin (LIPITOR) 20 MG tablet, Take 1 p.o. daily for high cholesterol as directed, Disp: 90 tablet, Rfl: 3    ezetimibe (ZETIA) 10 MG tablet, Take 1 p.o. daily for high cholesterol as directed, Disp: 90 tablet, Rfl: 3    levothyroxine (SYNTHROID, LEVOTHROID) 100 MCG tablet, Take 1 p.o. every morning for low thyroid, Disp: 90 tablet, Rfl: 3    SITagliptin-metFORMIN HCl ER (Janumet XR) 100-1000 MG tablet, Take 1 p.o. daily for diabetes.  Take with food., Disp: 90 tablet, Rfl: 3    MAGNESIUM PO, Take 150 mg by mouth Daily., Disp: , Rfl:     multivitamin (THERAGRAN) tablet tablet, Take 1 tablet by mouth Daily., Disp: , Rfl:     Psyllium 43 % powder, Take 2 teaspoon(s) by mouth  "Daily., Disp: , Rfl:     vitamin C (ASCORBIC ACID) 500 MG tablet, Take 1 tablet by mouth Daily., Disp: , Rfl:     Zinc 50 MG capsule, Take 1 capsule by mouth Daily., Disp: , Rfl:       Family History   Problem Relation Age of Onset    Coronary artery disease Mother     Diabetes Mother     Hypertension Mother         Essential    Hyperlipidemia Mother     Diabetes Father     Hypertension Father         Essential    Hyperlipidemia Father     Coronary artery disease Brother     Diabetes Brother     Hypertension Brother         Essential    Hyperlipidemia Brother     Malig Hyperthermia Neg Hx          Social History     Socioeconomic History    Marital status:    Tobacco Use    Smoking status: Former     Types: Cigars     Quit date:      Years since quittin.7    Smokeless tobacco: Never   Vaping Use    Vaping Use: Never used   Substance and Sexual Activity    Alcohol use: Not Currently     Comment: occasional    Drug use: No    Sexual activity: Yes     Partners: Female         Vitals:    23 1451   BP: 120/62   Pulse: 69   Temp: 98.2 °F (36.8 °C)   SpO2: 96%   Weight: 80.2 kg (176 lb 12.8 oz)   Height: 177.8 cm (70\")        Body mass index is 25.37 kg/m².      Physical Exam:    General: Alert and oriented x 3.  No acute distress.  Normal affect.  HEENT: Pupils equal, round, reactive to light; extraocular movements intact; sclerae nonicteric; pharynx, ear canals and TMs normal.  Neck: Without JVD, thyromegaly, bruit, or adenopathy.  Lungs: Clear to auscultation in all fields.  Heart: Regular rate and rhythm without murmur, rub, gallop, or click.  Abdomen: Soft, nontender, without hepatosplenomegaly or hernia.  Bowel sounds normal.  : Deferred.  Rectal: Deferred.  Extremities: Without clubbing, cyanosis, edema, or pulse deficit.  Neurologic: Intact without focal deficit.  Normal station and gait observed during ingress and egress from the examination room.  Skin: Without significant lesion.  " Musculoskeletal: Unremarkable.    September 29, 2023--routine diabetic foot exam reveals no evidence of diabetic foot ulcer or preulcerative callus.  Distal pulses palpable and sensation intact.  Patient has a plantar wart in the arch of the right foot.  See below.    Lab/other results:    CBC normal except white count 13.93.  CPK normal.  CMP normal except potassium just slightly elevated at 5.4.  Hemoglobin A1c 6.1.  Microalbumin/creatinine ratio less than 1.2.  Total cholesterol 119, triglycerides 102, LDL particle #603, HDL particle #38.3.  Thyroid function tests are normal.  PSA is normal at 1.31.  Urinalysis normal, vitamin D normal at 39.7.    Assessment/Plan:     Diagnosis Plan   1. Routine physical examination        2. Type 2 diabetes mellitus with microalbuminuria, without long-term current use of insulin  SITagliptin-metFORMIN HCl ER (Janumet XR) 100-1000 MG tablet    Comprehensive Metabolic Panel    Hemoglobin A1c      3. Microalbuminuria        4. Primary hypothyroidism  levothyroxine (SYNTHROID, LEVOTHROID) 100 MCG tablet    Comprehensive Metabolic Panel    TSH    T4, Free    T3, Free      5. Hyperlipidemia  ezetimibe (ZETIA) 10 MG tablet    atorvastatin (LIPITOR) 20 MG tablet    CK    Comprehensive Metabolic Panel    NMR LipoProfile      6. Vitamin D deficiency  Vitamin D,25-Hydroxy      7. Other elevated white blood cell count  CBC & Differential      8. Former cigarette smoker        9. Diabetic foot exam        10. Colon cancer screening  Cologuard - Stool, Per Rectum        Patient presents for his routine annual/preventative visit and seems to be doing fairly well.  He has type 2 diabetes that is under excellent control.  Microalbuminuria is under good control and is not evident at the present time.  His thyroid is therapeutic on the current dose of levothyroxine.  His cholesterol is under excellent control with the generic Lipitor and Zetia.  Vitamin D in normal range.  He has a mild elevation  of his white count which seems to fluctuate and we will continue to monitor this.    Several preventative health issues discussed including review of vaccinations and recommendations, including dietary issues, exercise and weight loss.  Safe sex practices discussed.  Patient advised to wear seatbelt whenever driving and avoid texting and driving.  Also advised to look both ways before crossing the street.  Patient needs Cologuard..  Advised to avoid tobacco products and minimize alcohol consumption.    Plan is as follows: Would not make any changes to his current medical regimen.  Patient continue to work on diet and attainment of ideal body weight.  Low-carb diet.  Cologuard ordered.  Patient will get influenza vaccine towards the end of next month October.  COVID booster discussed.  Follow-up 6 months with lab prior or follow-up as needed.        Procedures

## 2023-12-12 ENCOUNTER — OFFICE VISIT (OUTPATIENT)
Dept: INTERNAL MEDICINE | Facility: CLINIC | Age: 63
End: 2023-12-12
Payer: COMMERCIAL

## 2023-12-12 ENCOUNTER — HOSPITAL ENCOUNTER (OUTPATIENT)
Dept: GENERAL RADIOLOGY | Facility: HOSPITAL | Age: 63
Discharge: HOME | End: 2023-12-12
Admitting: INTERNAL MEDICINE
Payer: COMMERCIAL

## 2023-12-12 VITALS
DIASTOLIC BLOOD PRESSURE: 64 MMHG | OXYGEN SATURATION: 98 % | BODY MASS INDEX: 25.48 KG/M2 | RESPIRATION RATE: 12 BRPM | HEIGHT: 70 IN | SYSTOLIC BLOOD PRESSURE: 100 MMHG | HEART RATE: 78 BPM | WEIGHT: 178 LBS

## 2023-12-12 DIAGNOSIS — M25.562 ACUTE PAIN OF LEFT KNEE: ICD-10-CM

## 2023-12-12 DIAGNOSIS — M25.562 ACUTE PAIN OF LEFT KNEE: Primary | ICD-10-CM

## 2023-12-12 DIAGNOSIS — M17.12 PRIMARY OSTEOARTHRITIS OF LEFT KNEE: ICD-10-CM

## 2023-12-12 PROCEDURE — 73560 X-RAY EXAM OF KNEE 1 OR 2: CPT

## 2023-12-12 PROCEDURE — 99214 OFFICE O/P EST MOD 30 MIN: CPT | Performed by: INTERNAL MEDICINE

## 2023-12-12 RX ORDER — MELOXICAM 15 MG/1
15 TABLET ORAL DAILY
Qty: 30 TABLET | Refills: 0 | Status: SHIPPED | OUTPATIENT
Start: 2023-12-12

## 2023-12-12 NOTE — PROGRESS NOTES
12/12/2023    Patient Information  Prudencio Mckinley                                                                                          [address]  [place] [address]      1960  [unfilled]  [phone] (work)    Chief Complaint:     Complaining of left knee pain.    History of Present Illness:    Patient with type 2 diabetes, hypothyroidism, microalbuminuria, hyperlipidemia and mild osteoarthritis of the left knee presents today with complaints of left knee pain as described below:    Patient with a history of previous chronic left knee pain and mild arthritis of the knee presents with least a 1 week history of left knee pain without known trauma.  Is gotten progressively worse and seems to be aggravated because he has been doing a lot of walking here of late.  He had to miss work due to this.  He has to climb quite a number of steps while at work and finds this very difficult.  On exam there is no redness, swelling, or obvious effusion.  There is some tenderness over the anserine bursa area but no surrounding warmth or erythema.  Range of motion seems fairly good and the ligaments seem stable.  Plan is as follows: X-ray left knee.  Meloxicam 15 mg 1 p.o. daily, #30.  If symptoms persist then we can refer him back to the orthopedist or if the x-ray shows something significant.    Review of Systems   Constitutional: Negative.   HENT: Negative.     Eyes: Negative.    Cardiovascular: Negative.    Respiratory: Negative.     Endocrine: Negative.    Hematologic/Lymphatic: Negative.    Skin: Negative.    Musculoskeletal:  Positive for arthritis and joint pain. Negative for joint swelling.        Left knee pain   Gastrointestinal: Negative.    Genitourinary: Negative.    Neurological: Negative.    Psychiatric/Behavioral: Negative.     Allergic/Immunologic: Negative.        Active Problems:    Patient Active Problem List   Diagnosis    Hiatal hernia    Hyperlipidemia    Primary hypothyroidism     Microalbuminuria    Type 2 diabetes mellitus with microalbuminuria, without long-term current use of insulin    Vitamin D deficiency    Therapeutic drug monitoring    Routine physical examination    Diabetic foot exam    Diabetic eye exam    Former cigarette smoker    Other elevated white blood cell count    Acute pain of left knee    Primary osteoarthritis of left knee         Past Medical History:   Diagnosis Date    Former cigarette smoker 06/22/2022    Hiatal hernia 07/28/2011 07/28/2011--3 cm hiatal hernia, otherwise normal EGD.    History of diverticulitis of colon 09/29/2023 March 2023--presented with left lower quadrant pain.  CT scan consistent with diverticulitis.  Rated successfully with antibiotics.    History of nephrolithiasis Approximately 1985    Approximately 1985--patient passed a kidney stone stone spontaneously.    History of peptic ulcer Approximately 1979    Approximately 1979--patient was told he had a peptic ulcer and was placed on Mylanta.  It sounds as if he had an EGD.    Hyperlipidemia 06/22/2012 06/22/2012--treatment for hyperlipidemia begun.    Hypothyroidism 04/27/2012 05/04/2012--treatment for hypothyroidism begun.  05/01/2012--ultrasound of the thyroid revealed increased hypervascularity, slightly heterogeneous echo.   04/27/2012--initial diagnosis of hypothyroidism.    Microalbuminuria 05/15/2015    05/15/2015--urine microalbumin slightly elevated at 19.4. Normal range 0.0--17.0.    Primary hypothyroidism 04/27/2012 05/04/2012--treatment for hypothyroidism begun.     05/01/2012--ultrasound of the thyroid revealed increased hypervascularity, slightly heterogeneous echo.      04/27/2012--initial diagnosis of hypothyroidism.    Type 2 diabetes mellitus with microalbuminuria, without long-term current use of insulin 04/01/2011    05/10/2013--initial diagnosis of mild diabetes. Hemoglobin A1c 6.6.     04/01/2011--mild impaired fasting glucose.           Vitamin D  deficiency 03/21/2016         Past Surgical History:   Procedure Laterality Date    CARDIAC CATHETERIZATION  2010    COLONOSCOPY  07/28/2011 07/28/2011--normal colonoscopy with an excellent prep.    ESOPHAGOSCOPY / EGD  07/28/2011 07/28/2011--3 cm hiatal hernia, otherwise normal EGD.    INGUINAL HERNIA REPAIR Left 3/30/2023    Procedure: laparoscopic left inguinal hernia repair possible bilateral and umbilical hernia repair;  Surgeon: Kelsy Bejarano DO;  Location:  [facility] OR;  Service: General;  Laterality: Left;    ROTATOR CUFF REPAIR Left 02/04/2022 February 4, 2022--left rotator cuff surgery.    TONSILLECTOMY      11 years old.    UMBILICAL HERNIA REPAIR N/A 3/30/2023    Procedure: UMBILICAL HERNIA REPAIR;  Surgeon: Kelsy Bejarano DO;  Location:  [facility] OR;  Service: General;  Laterality: N/A;         No Known Allergies        Current Outpatient Medications:     atorvastatin (LIPITOR) 20 MG tablet, Take 1 p.o. daily for high cholesterol as directed, Disp: 90 tablet, Rfl: 3    ezetimibe (ZETIA) 10 MG tablet, Take 1 p.o. daily for high cholesterol as directed, Disp: 90 tablet, Rfl: 3    levothyroxine (SYNTHROID, LEVOTHROID) 100 MCG tablet, Take 1 p.o. every morning for low thyroid, Disp: 90 tablet, Rfl: 3    MAGNESIUM PO, Take 150 mg by mouth Daily., Disp: , Rfl:     multivitamin (THERAGRAN) tablet tablet, Take 1 tablet by mouth Daily., Disp: , Rfl:     Psyllium 43 % powder, Take 2 teaspoon(s) by mouth Daily., Disp: , Rfl:     SITagliptin-metFORMIN HCl ER (Janumet XR) 100-1000 MG tablet, Take 1 p.o. daily for diabetes.  Take with food., Disp: 90 tablet, Rfl: 3    vitamin C (ASCORBIC ACID) 500 MG tablet, Take 1 tablet by mouth Daily., Disp: , Rfl:     Zinc 50 MG capsule, Take 1 capsule by mouth Daily., Disp: , Rfl:     meloxicam (MOBIC) 15 MG tablet, Take 1 tablet by mouth Daily., Disp: 30 tablet, Rfl: 0      Family History   Problem Relation Age of Onset    Coronary artery disease Mother      "Diabetes Mother     Hypertension Mother         Essential    Hyperlipidemia Mother     Diabetes Father     Hypertension Father         Essential    Hyperlipidemia Father     Coronary artery disease Brother     Diabetes Brother     Hypertension Brother         Essential    Hyperlipidemia Brother     Malig Hyperthermia Neg Hx          Social History     Socioeconomic History    Marital status:    Tobacco Use    Smoking status: Former     Types: Cigars     Quit date:      Years since quittin.9    Smokeless tobacco: Never   Vaping Use    Vaping Use: Never used   Substance and Sexual Activity    Alcohol use: Not Currently     Comment: occasional    Drug use: No    Sexual activity: Yes     Partners: Female         Vitals:    23 1306   BP: 100/64   BP Location: Left arm   Patient Position: Sitting   Cuff Size: Adult   Pulse: 78   Resp: 12   SpO2: 98%   Weight: 80.7 kg (178 lb)   Height: 177.8 cm (70\")        Body mass index is 25.54 kg/m².      Physical Exam:    Limited examination involving the left knee reveals good range of motion and ligaments seem stable.  There is no erythema or warmth or overlying rash.  There is some tenderness to palpation over the anserine bursal area on the right but nothing significant.    Lab/other results:      Assessment/Plan:     Diagnosis Plan   1. Acute pain of left knee  meloxicam (MOBIC) 15 MG tablet    XR knee 1 or 2 vw left      2. Primary osteoarthritis of left knee  meloxicam (MOBIC) 15 MG tablet    XR knee 1 or 2 vw left        Patient with a history of previous chronic left knee pain and mild arthritis of the knee presents with least a 1 week history of left knee pain without known trauma.  Is gotten progressively worse and seems to be aggravated because he has been doing a lot of walking here of late.  He had to miss work due to this.  He has to climb quite a number of steps while at work and finds this very difficult.  On exam there is no redness, swelling, " or obvious effusion.  There is some tenderness over the anserine bursa area but no surrounding warmth or erythema.  Range of motion seems fairly good and the ligaments seem stable.  Plan is as follows: X-ray left knee.  Meloxicam 15 mg 1 p.o. daily, #30.  If symptoms persist then we can refer him back to the orthopedist or if the x-ray shows something significant.      Procedures

## 2024-03-22 ENCOUNTER — LAB (OUTPATIENT)
Dept: LAB | Facility: HOSPITAL | Age: 64
End: 2024-03-22
Payer: COMMERCIAL

## 2024-03-22 DIAGNOSIS — E78.2 MIXED HYPERLIPIDEMIA: Chronic | ICD-10-CM

## 2024-03-22 DIAGNOSIS — R80.9 TYPE 2 DIABETES MELLITUS WITH MICROALBUMINURIA, WITHOUT LONG-TERM CURRENT USE OF INSULIN: Chronic | ICD-10-CM

## 2024-03-22 DIAGNOSIS — D72.828 OTHER ELEVATED WHITE BLOOD CELL COUNT: ICD-10-CM

## 2024-03-22 DIAGNOSIS — E03.9 PRIMARY HYPOTHYROIDISM: Chronic | ICD-10-CM

## 2024-03-22 DIAGNOSIS — E55.9 VITAMIN D DEFICIENCY: Chronic | ICD-10-CM

## 2024-03-22 DIAGNOSIS — E11.29 TYPE 2 DIABETES MELLITUS WITH MICROALBUMINURIA, WITHOUT LONG-TERM CURRENT USE OF INSULIN: Chronic | ICD-10-CM

## 2024-03-22 LAB
25(OH)D3 SERPL-MCNC: 43.3 NG/ML (ref 30–100)
ALBUMIN SERPL-MCNC: 4.4 G/DL (ref 3.5–5.2)
ALBUMIN/GLOB SERPL: 2 G/DL
ALP SERPL-CCNC: 63 U/L (ref 39–117)
ALT SERPL W P-5'-P-CCNC: 26 U/L (ref 1–41)
ANION GAP SERPL CALCULATED.3IONS-SCNC: 9 MMOL/L (ref 5–15)
AST SERPL-CCNC: 28 U/L (ref 1–40)
BASOPHILS # BLD AUTO: 0.09 10*3/MM3 (ref 0–0.2)
BASOPHILS NFR BLD AUTO: 0.5 % (ref 0–1.5)
BILIRUB SERPL-MCNC: 0.4 MG/DL (ref 0–1.2)
BUN SERPL-MCNC: 12 MG/DL (ref 8–23)
BUN/CREAT SERPL: 14.3 (ref 7–25)
CALCIUM SPEC-SCNC: 9.3 MG/DL (ref 8.6–10.5)
CHLORIDE SERPL-SCNC: 101 MMOL/L (ref 98–107)
CK SERPL-CCNC: 211 U/L (ref 20–200)
CO2 SERPL-SCNC: 27 MMOL/L (ref 22–29)
CREAT SERPL-MCNC: 0.84 MG/DL (ref 0.76–1.27)
DEPRECATED RDW RBC AUTO: 47.7 FL (ref 37–54)
EGFRCR SERPLBLD CKD-EPI 2021: 98.6 ML/MIN/1.73
EOSINOPHIL # BLD AUTO: 0.32 10*3/MM3 (ref 0–0.4)
EOSINOPHIL NFR BLD AUTO: 1.9 % (ref 0.3–6.2)
ERYTHROCYTE [DISTWIDTH] IN BLOOD BY AUTOMATED COUNT: 13.8 % (ref 12.3–15.4)
GLOBULIN UR ELPH-MCNC: 2.2 GM/DL
GLUCOSE SERPL-MCNC: 96 MG/DL (ref 65–99)
HBA1C MFR BLD: 6.5 % (ref 4.8–5.6)
HCT VFR BLD AUTO: 48.4 % (ref 37.5–51)
HGB BLD-MCNC: 15.6 G/DL (ref 13–17.7)
IMM GRANULOCYTES # BLD AUTO: 0.07 10*3/MM3 (ref 0–0.05)
IMM GRANULOCYTES NFR BLD AUTO: 0.4 % (ref 0–0.5)
LYMPHOCYTES # BLD AUTO: 2.1 10*3/MM3 (ref 0.7–3.1)
LYMPHOCYTES NFR BLD AUTO: 12.5 % (ref 19.6–45.3)
MCH RBC QN AUTO: 30.2 PG (ref 26.6–33)
MCHC RBC AUTO-ENTMCNC: 32.2 G/DL (ref 31.5–35.7)
MCV RBC AUTO: 93.8 FL (ref 79–97)
MONOCYTES # BLD AUTO: 0.95 10*3/MM3 (ref 0.1–0.9)
MONOCYTES NFR BLD AUTO: 5.7 % (ref 5–12)
NEUTROPHILS NFR BLD AUTO: 13.24 10*3/MM3 (ref 1.7–7)
NEUTROPHILS NFR BLD AUTO: 79 % (ref 42.7–76)
NRBC BLD AUTO-RTO: 0 /100 WBC (ref 0–0.2)
PLATELET # BLD AUTO: 222 10*3/MM3 (ref 140–450)
PMV BLD AUTO: 11.4 FL (ref 6–12)
POTASSIUM SERPL-SCNC: 4.7 MMOL/L (ref 3.5–5.2)
PROT SERPL-MCNC: 6.6 G/DL (ref 6–8.5)
RBC # BLD AUTO: 5.16 10*6/MM3 (ref 4.14–5.8)
SODIUM SERPL-SCNC: 137 MMOL/L (ref 136–145)
T3FREE SERPL-MCNC: 2.87 PG/ML (ref 2–4.4)
T4 FREE SERPL-MCNC: 1.31 NG/DL (ref 0.93–1.7)
TSH SERPL DL<=0.05 MIU/L-ACNC: 0.82 UIU/ML (ref 0.27–4.2)
WBC NRBC COR # BLD AUTO: 16.77 10*3/MM3 (ref 3.4–10.8)

## 2024-03-22 PROCEDURE — 82306 VITAMIN D 25 HYDROXY: CPT

## 2024-03-22 PROCEDURE — 83036 HEMOGLOBIN GLYCOSYLATED A1C: CPT

## 2024-03-22 PROCEDURE — 84481 FREE ASSAY (FT-3): CPT

## 2024-03-22 PROCEDURE — 82550 ASSAY OF CK (CPK): CPT

## 2024-03-22 PROCEDURE — 80050 GENERAL HEALTH PANEL: CPT

## 2024-03-22 PROCEDURE — 84439 ASSAY OF FREE THYROXINE: CPT

## 2024-03-22 PROCEDURE — 36415 COLL VENOUS BLD VENIPUNCTURE: CPT

## 2024-03-22 PROCEDURE — 80061 LIPID PANEL: CPT

## 2024-03-22 PROCEDURE — 83704 LIPOPROTEIN BLD QUAN PART: CPT

## 2024-03-23 LAB
CHOLEST SERPL-MCNC: 116 MG/DL (ref 100–199)
HDL SERPL-SCNC: 37.7 UMOL/L
HDLC SERPL-MCNC: 53 MG/DL
LDL SERPL QN: 19.8 NM
LDL SERPL-SCNC: 685 NMOL/L
LDL SMALL SERPL-SCNC: 473 NMOL/L
LDLC SERPL CALC-MCNC: 48 MG/DL (ref 0–99)
TRIGL SERPL-MCNC: 76 MG/DL (ref 0–149)

## 2024-03-29 ENCOUNTER — OFFICE VISIT (OUTPATIENT)
Dept: INTERNAL MEDICINE | Facility: CLINIC | Age: 64
End: 2024-03-29
Payer: COMMERCIAL

## 2024-03-29 VITALS
WEIGHT: 178 LBS | DIASTOLIC BLOOD PRESSURE: 70 MMHG | BODY MASS INDEX: 25.48 KG/M2 | HEIGHT: 70 IN | HEART RATE: 74 BPM | SYSTOLIC BLOOD PRESSURE: 110 MMHG | OXYGEN SATURATION: 97 % | TEMPERATURE: 96.5 F | RESPIRATION RATE: 16 BRPM

## 2024-03-29 DIAGNOSIS — E03.9 PRIMARY HYPOTHYROIDISM: Chronic | ICD-10-CM

## 2024-03-29 DIAGNOSIS — R80.9 TYPE 2 DIABETES MELLITUS WITH MICROALBUMINURIA, WITHOUT LONG-TERM CURRENT USE OF INSULIN: Primary | Chronic | ICD-10-CM

## 2024-03-29 DIAGNOSIS — Z00.00 ROUTINE PHYSICAL EXAMINATION: ICD-10-CM

## 2024-03-29 DIAGNOSIS — Z87.891 FORMER CIGARETTE SMOKER: Chronic | ICD-10-CM

## 2024-03-29 DIAGNOSIS — M17.12 PRIMARY OSTEOARTHRITIS OF LEFT KNEE: Chronic | ICD-10-CM

## 2024-03-29 DIAGNOSIS — E78.2 MIXED HYPERLIPIDEMIA: Chronic | ICD-10-CM

## 2024-03-29 DIAGNOSIS — D72.9 NEUTROPHILIC LEUKOCYTOSIS: ICD-10-CM

## 2024-03-29 DIAGNOSIS — E55.9 VITAMIN D DEFICIENCY: Chronic | ICD-10-CM

## 2024-03-29 DIAGNOSIS — Z51.81 THERAPEUTIC DRUG MONITORING: ICD-10-CM

## 2024-03-29 DIAGNOSIS — E11.29 TYPE 2 DIABETES MELLITUS WITH MICROALBUMINURIA, WITHOUT LONG-TERM CURRENT USE OF INSULIN: Primary | Chronic | ICD-10-CM

## 2024-03-29 DIAGNOSIS — R80.9 MICROALBUMINURIA: Chronic | ICD-10-CM

## 2024-03-29 DIAGNOSIS — D48.5 NEOPLASM OF UNCERTAIN BEHAVIOR OF SKIN: ICD-10-CM

## 2024-03-29 PROBLEM — M25.562 ACUTE PAIN OF LEFT KNEE: Status: RESOLVED | Noted: 2023-12-12 | Resolved: 2024-03-29

## 2024-03-29 PROBLEM — D72.828 NEUTROPHILIC LEUKOCYTOSIS: Chronic | Status: ACTIVE | Noted: 2022-06-22

## 2024-03-29 NOTE — PROGRESS NOTES
03/29/2024    Patient Information  Prudencio Mckinley                                                                                          [address]  [place] [address]      1960  [unfilled]  [phone] (work)    Chief Complaint:     Follow-up medical problems and recent lab work.    History of Present Illness:    Patient with several chronic medical problems as noted below in assessment and plan presents today for follow-up with lab prior in order to monitor his chronic medical issues.  His past medical history reviewed and updated were necessary including health maintenance parameters.  This reveals he is up-to-date or else accounted for with the exception of diabetic eye exam which I encouraged him to get.  He has no new acute complaints.    Review of Systems   Constitutional: Negative.   HENT: Negative.     Eyes: Negative.    Cardiovascular: Negative.    Respiratory: Negative.     Endocrine: Negative.    Hematologic/Lymphatic: Negative.    Skin: Negative.    Musculoskeletal:  Positive for arthritis and joint pain.        Chronic left knee pain   Gastrointestinal: Negative.    Genitourinary: Negative.    Neurological: Negative.    Psychiatric/Behavioral: Negative.     Allergic/Immunologic: Negative.        Active Problems:    Patient Active Problem List   Diagnosis    Hiatal hernia    Hyperlipidemia    Primary hypothyroidism    Microalbuminuria    Type 2 diabetes mellitus with microalbuminuria, without long-term current use of insulin    Vitamin D deficiency    Therapeutic drug monitoring    Routine physical examination    Diabetic foot exam    Diabetic eye exam    Former cigarette smoker    Neutrophilic leukocytosis    Primary osteoarthritis of left knee    Neoplasm of uncertain behavior of skin         Past Medical History:   Diagnosis Date    Former cigarette smoker 06/22/2022    Hiatal hernia 07/28/2011 07/28/2011--3 cm hiatal hernia, otherwise normal EGD.    History of  diverticulitis of colon 09/29/2023 March 2023--presented with left lower quadrant pain.  CT scan consistent with diverticulitis.  Rated successfully with antibiotics.    History of nephrolithiasis Approximately 1985    Approximately 1985--patient passed a kidney stone stone spontaneously.    History of peptic ulcer Approximately 1979    Approximately 1979--patient was told he had a peptic ulcer and was placed on Mylanta.  It sounds as if he had an EGD.    Hyperlipidemia 06/22/2012 06/22/2012--treatment for hyperlipidemia begun.    Hypothyroidism 04/27/2012 05/04/2012--treatment for hypothyroidism begun.  05/01/2012--ultrasound of the thyroid revealed increased hypervascularity, slightly heterogeneous echo.   04/27/2012--initial diagnosis of hypothyroidism.    Microalbuminuria 05/15/2015    05/15/2015--urine microalbumin slightly elevated at 19.4. Normal range 0.0--17.0.    Neutrophilic leukocytosis 06/22/2022    Primary hypothyroidism 04/27/2012 05/04/2012--treatment for hypothyroidism begun.     05/01/2012--ultrasound of the thyroid revealed increased hypervascularity, slightly heterogeneous echo.      04/27/2012--initial diagnosis of hypothyroidism.    Primary osteoarthritis of left knee 12/12/2023    Primary osteoarthritis of left knee 12/12/2023 December 12, 2023--x-ray of the left knee:           Narrative & Impression      XR KNEE 1 OR 2 VW LEFT-     HISTORY: 63-year-old male with acute left knee pain.     FINDINGS: There are moderately advanced osteoarthritic changes at the  medial tibiofemoral compartment. There is no joint effusion or acute  abnormality.     This report was finalized on 12/13/2023 10:36 AM by Dr. Jana Garsia M.D  on     Type 2 diabetes mellitus with microalbuminuria, without long-term current use of insulin 04/01/2011    05/10/2013--initial diagnosis of mild diabetes. Hemoglobin A1c 6.6.     04/01/2011--mild impaired fasting glucose.           Vitamin D deficiency 03/21/2016          Past Surgical History:   Procedure Laterality Date    CARDIAC CATHETERIZATION  2010    COLONOSCOPY  07/28/2011 07/28/2011--normal colonoscopy with an excellent prep.    ESOPHAGOSCOPY / EGD  07/28/2011 07/28/2011--3 cm hiatal hernia, otherwise normal EGD.    INGUINAL HERNIA REPAIR Left 3/30/2023    Procedure: laparoscopic left inguinal hernia repair possible bilateral and umbilical hernia repair;  Surgeon: Kelsy Bejarano DO;  Location:  [facility];  Service: General;  Laterality: Left;    ROTATOR CUFF REPAIR Left 02/04/2022 February 4, 2022--left rotator cuff surgery.    TONSILLECTOMY      11 years old.    UMBILICAL HERNIA REPAIR N/A 3/30/2023    Procedure: UMBILICAL HERNIA REPAIR;  Surgeon: Kelsy Bejarano DO;  Location:  [facility];  Service: General;  Laterality: N/A;         No Known Allergies        Current Outpatient Medications:     atorvastatin (LIPITOR) 20 MG tablet, Take 1 p.o. daily for high cholesterol as directed, Disp: 90 tablet, Rfl: 3    ezetimibe (ZETIA) 10 MG tablet, Take 1 p.o. daily for high cholesterol as directed, Disp: 90 tablet, Rfl: 3    levothyroxine (SYNTHROID, LEVOTHROID) 100 MCG tablet, Take 1 p.o. every morning for low thyroid, Disp: 90 tablet, Rfl: 3    MAGNESIUM PO, Take 150 mg by mouth Daily., Disp: , Rfl:     meloxicam (MOBIC) 15 MG tablet, Take 1 tablet by mouth Daily., Disp: 30 tablet, Rfl: 0    multivitamin (THERAGRAN) tablet tablet, Take 1 tablet by mouth Daily., Disp: , Rfl:     Psyllium 43 % powder, Take 2 teaspoon(s) by mouth Daily., Disp: , Rfl:     SITagliptin-metFORMIN HCl ER (Janumet XR) 100-1000 MG tablet, Take 1 p.o. daily for diabetes.  Take with food., Disp: 90 tablet, Rfl: 3    vitamin C (ASCORBIC ACID) 500 MG tablet, Take 1 tablet by mouth Daily., Disp: , Rfl:     Zinc 50 MG capsule, Take 1 capsule by mouth Daily., Disp: , Rfl:       Family History   Problem Relation Age of Onset    Coronary artery disease Mother     Diabetes Mother      "Hypertension Mother         Essential    Hyperlipidemia Mother     Diabetes Father     Hypertension Father         Essential    Hyperlipidemia Father     Coronary artery disease Brother     Diabetes Brother     Hypertension Brother         Essential    Hyperlipidemia Brother     Malig Hyperthermia Neg Hx          Social History     Socioeconomic History    Marital status:    Tobacco Use    Smoking status: Former     Types: Cigars     Quit date: 2021     Years since quitting: 3.2    Smokeless tobacco: Never   Vaping Use    Vaping status: Never Used   Substance and Sexual Activity    Alcohol use: Not Currently     Comment: occasional    Drug use: No    Sexual activity: Yes     Partners: Female         Vitals:    03/29/24 1320   BP: 110/70   Pulse: 74   Resp: 16   Temp: 96.5 °F (35.8 °C)   SpO2: 97%   Weight: 80.7 kg (178 lb)   Height: 177.8 cm (70\")        Body mass index is 25.54 kg/m².      Physical Exam:    General: Alert and oriented x 3.  No acute distress.  Normal affect.  HEENT: Pupils equal, round, reactive to light; extraocular movements intact; sclerae nonicteric; pharynx, ear canals and TMs normal.  Neck: Without JVD, thyromegaly, bruit, or adenopathy.  Lungs: Clear to auscultation in all fields.  Heart: Regular rate and rhythm without murmur, rub, gallop, or click.  Abdomen: Soft, nontender, without hepatosplenomegaly or hernia.  Bowel sounds normal.  : Deferred.  Rectal: Deferred.  Extremities: Without clubbing, cyanosis, edema, or pulse deficit.  Neurologic: Intact without focal deficit.  Normal station and gait observed during ingress and egress from the examination room.  Skin: Without significant lesion.  Musculoskeletal: Unremarkable.    Lab/other results:    CPK mildly elevated 211.  CMP is normal.  Hemoglobin A1c 6.5.  Total cholesterol 116, triglycerides 76, LDL particle #685, HDL particle #37.7.  Thyroid function tests are normal.  Vitamin D normal at 43.3.  White count is elevated at " 16.77.  Neutrophils elevated 79% and lymphocytes are low at 12.5%.  Immature granulocytes 0.07.    Assessment/Plan:     Diagnosis Plan   1. Type 2 diabetes mellitus with microalbuminuria, without long-term current use of insulin  Comprehensive Metabolic Panel    Hemoglobin A1c    Microalbumin / Creatinine Urine Ratio - Urine, Clean Catch    Urinalysis With Microscopic If Indicated (No Culture) - Urine, Clean Catch      2. Microalbuminuria  Microalbumin / Creatinine Urine Ratio - Urine, Clean Catch      3. Hyperlipidemia  CK    Comprehensive Metabolic Panel    NMR LipoProfile      4. Primary hypothyroidism  T4, Free    T3, Free    TSH      5. Vitamin D deficiency  Vitamin D,25-Hydroxy      6. Neutrophilic leukocytosis  Ambulatory Referral to Hematology    CBC & Differential    SKY + PE    SKY + PE      7. Former cigarette smoker        8. Primary osteoarthritis of left knee        9. Therapeutic drug monitoring        10. Routine physical examination  CBC & Differential    CK    Comprehensive Metabolic Panel    Hemoglobin A1c    NMR LipoProfile    Microalbumin / Creatinine Urine Ratio - Urine, Clean Catch    Vitamin D,25-Hydroxy    Urinalysis With Microscopic If Indicated (No Culture) - Urine, Clean Catch    T4, Free    T3, Free    TSH    PSA DIAGNOSTIC    SKY + PE    SKY + PE      11. Neoplasm of uncertain behavior of skin  Ambulatory Referral to Dermatology          Patient has type 2 diabetes is under excellent control with Janumet 100/1001 p.o. daily.  His cholesterol is under excellent control with 20 mg of generic Lipitor along with generic Zetia 10 mg/day.  His thyroid is therapeutic on levothyroxine 100 mcg/day.  Vitamin D is in normal range with supplementation.  Patient has a persistently elevated white count which is consistently rising over the past year and he needs to be evaluated by the hematology service.  He is a former cigarette smoker and is up-to-date on lung cancer screening.  Patient also had a  negative Cologuard just in the past year or so.  He has primary osteoarthritis left knee and meloxicam that I gave him while back seems to be helping somewhat.  If situation progresses we can refer him to orthopedist.    Plan is as follows: Referral to hematology to evaluate leukocytosis.  No change in current medical regimen.  Encouraged patient to get diabetic eye exam.  Will have him follow-up on or after September 29, 2024 with lab prior for his annual physical.  Otherwise he will follow-up as needed.  Will also go ahead and check immunofixation electrophoresis today.    Addendum: Patient reports that he has a skin lesion that comes and goes on his right upper arm laterally above the elbow.  The area is crusty and will scab up and sometimes he picks the scab off and then he gets a little better but comes right back.  This has been going on now for approximately 1 year.  On exam today there is a lot of secondary changes and there is a small scab in the central area and there is slight surrounding erythema but no sign of infection.  The area surrounding this is a little flaky as well.  I explained to patient we need to make sure that this is not a skin cancer and therefore I am going to refer him to dermatologist.      Procedures

## 2024-04-01 LAB
ALBUMIN SERPL ELPH-MCNC: 3.8 G/DL (ref 2.9–4.4)
ALBUMIN/GLOB SERPL: 1.6 {RATIO} (ref 0.7–1.7)
ALPHA1 GLOB SERPL ELPH-MCNC: 0.2 G/DL (ref 0–0.4)
ALPHA2 GLOB SERPL ELPH-MCNC: 0.6 G/DL (ref 0.4–1)
B-GLOBULIN SERPL ELPH-MCNC: 1 G/DL (ref 0.7–1.3)
GAMMA GLOB SERPL ELPH-MCNC: 0.6 G/DL (ref 0.4–1.8)
GLOBULIN SER-MCNC: 2.4 G/DL (ref 2.2–3.9)
IGA SERPL-MCNC: 172 MG/DL (ref 61–437)
IGG SERPL-MCNC: 605 MG/DL (ref 603–1613)
IGM SERPL-MCNC: 38 MG/DL (ref 20–172)
INTERPRETATION SERPL IEP-IMP: NORMAL
LABORATORY COMMENT REPORT: NORMAL
M PROTEIN SERPL ELPH-MCNC: NORMAL G/DL
PROT SERPL-MCNC: 6.2 G/DL (ref 6–8.5)

## 2024-04-04 ENCOUNTER — TELEPHONE (OUTPATIENT)
Dept: INTERNAL MEDICINE | Facility: CLINIC | Age: 64
End: 2024-04-04
Payer: COMMERCIAL

## 2024-05-01 DIAGNOSIS — E03.9 PRIMARY HYPOTHYROIDISM: Chronic | ICD-10-CM

## 2024-05-01 RX ORDER — LEVOTHYROXINE SODIUM 0.1 MG/1
TABLET ORAL
Qty: 90 TABLET | Refills: 3 | Status: SHIPPED | OUTPATIENT
Start: 2024-05-01

## 2024-06-12 NOTE — TELEPHONE ENCOUNTER
Last 6/2/20 next 1/5/21
Detail Level: Detailed
Size Of Lesion In Cm (Optional): 0
Detail Level: Zone
Body Location Override (Optional - Billing Will Still Be Based On Selected Body Map Location If Applicable): trunk
Detail Level: Generalized

## 2024-07-09 DIAGNOSIS — M17.12 PRIMARY OSTEOARTHRITIS OF LEFT KNEE: ICD-10-CM

## 2024-07-09 DIAGNOSIS — M25.562 ACUTE PAIN OF LEFT KNEE: ICD-10-CM

## 2024-07-09 RX ORDER — MELOXICAM 15 MG/1
15 TABLET ORAL DAILY
Qty: 30 TABLET | Refills: 0 | Status: SHIPPED | OUTPATIENT
Start: 2024-07-09

## 2024-07-29 NOTE — PROGRESS NOTES
Appointment    REASON FOR CONSULTATION: Neutrophilic leukocytosis                              REQUESTING PHYSICIAN: Lj Wong MD  RECORDS OBTAINED:  Records of the patients history including those from the electronic medical record were reviewed and summarized in detail.    HISTORY OF PRESENT ILLNESS:  The patient is a 64 y.o. year old male referred to our clinic for evaluation management of neutrophilic leukocytosis. upon lab review he has had intermittent leukocytosis since June 2021 with WBC ranging between 12-16; absolute neutrophil count ranging between 9.2-13.2.  He denies any B symptoms.  He smokes 2 to 3 cigars/day.  He has intentionally lost a few pounds over the last few years with dietary changes.  He is quite active.  No personal or family history of cancer except for skin cancers.         Past Medical History:   Diagnosis Date    Former cigarette smoker 06/22/2022    Hiatal hernia 07/28/2011 07/28/2011--3 cm hiatal hernia, otherwise normal EGD.    History of diverticulitis of colon 09/29/2023 March 2023--presented with left lower quadrant pain.  CT scan consistent with diverticulitis.  Rated successfully with antibiotics.    History of nephrolithiasis Approximately 1985    Approximately 1985--patient passed a kidney stone stone spontaneously.    History of peptic ulcer Approximately 1979    Approximately 1979--patient was told he had a peptic ulcer and was placed on Mylanta.  It sounds as if he had an EGD.    Hyperlipidemia 06/22/2012 06/22/2012--treatment for hyperlipidemia begun.    Hypothyroidism 04/27/2012 05/04/2012--treatment for hypothyroidism begun.  05/01/2012--ultrasound of the thyroid revealed increased hypervascularity, slightly heterogeneous echo.   04/27/2012--initial diagnosis of hypothyroidism.    Microalbuminuria 05/15/2015    05/15/2015--urine microalbumin slightly elevated at 19.4. Normal range 0.0--17.0.    Neutrophilic leukocytosis 06/22/2022    Primary  hypothyroidism 04/27/2012 05/04/2012--treatment for hypothyroidism begun.     05/01/2012--ultrasound of the thyroid revealed increased hypervascularity, slightly heterogeneous echo.      04/27/2012--initial diagnosis of hypothyroidism.    Primary osteoarthritis of left knee 12/12/2023    Primary osteoarthritis of left knee 12/12/2023 December 12, 2023--x-ray of the left knee:           Narrative & Impression      XR KNEE 1 OR 2 VW LEFT-     HISTORY: 63-year-old male with acute left knee pain.     FINDINGS: There are moderately advanced osteoarthritic changes at the  medial tibiofemoral compartment. There is no joint effusion or acute  abnormality.     This report was finalized on 12/13/2023 10:36 AM by Dr. Jana Garsia M.D  on     Type 2 diabetes mellitus with microalbuminuria, without long-term current use of insulin 04/01/2011    05/10/2013--initial diagnosis of mild diabetes. Hemoglobin A1c 6.6.     04/01/2011--mild impaired fasting glucose.           Vitamin D deficiency 03/21/2016     Past Surgical History:   Procedure Laterality Date    CARDIAC CATHETERIZATION  2010    COLONOSCOPY  07/28/2011 07/28/2011--normal colonoscopy with an excellent prep.    ESOPHAGOSCOPY / EGD  07/28/2011 07/28/2011--3 cm hiatal hernia, otherwise normal EGD.    INGUINAL HERNIA REPAIR Left 3/30/2023    Procedure: laparoscopic left inguinal hernia repair possible bilateral and umbilical hernia repair;  Surgeon: Kelsy Bejarano DO;  Location:  [facility];  Service: General;  Laterality: Left;    ROTATOR CUFF REPAIR Left 02/04/2022 February 4, 2022--left rotator cuff surgery.    TONSILLECTOMY      11 years old.    UMBILICAL HERNIA REPAIR N/A 3/30/2023    Procedure: UMBILICAL HERNIA REPAIR;  Surgeon: Kelsy Bejarano DO;  Location:  [facility];  Service: General;  Laterality: N/A;       MEDICATIONS    Current Outpatient Medications:     atorvastatin (LIPITOR) 20 MG tablet, Take 1 p.o. daily for high cholesterol as  directed, Disp: 90 tablet, Rfl: 3    ezetimibe (ZETIA) 10 MG tablet, Take 1 p.o. daily for high cholesterol as directed, Disp: 90 tablet, Rfl: 3    levothyroxine (SYNTHROID, LEVOTHROID) 100 MCG tablet, TAKE 1 TABLET BY MOUTH EVERY DAY, Disp: 90 tablet, Rfl: 3    MAGNESIUM PO, Take 150 mg by mouth Daily., Disp: , Rfl:     meloxicam (MOBIC) 15 MG tablet, TAKE 1 TABLET BY MOUTH DAILY, Disp: 30 tablet, Rfl: 0    multivitamin (THERAGRAN) tablet tablet, Take 1 tablet by mouth Daily., Disp: , Rfl:     Psyllium 43 % powder, Take 2 teaspoon(s) by mouth Daily., Disp: , Rfl:     SITagliptin-metFORMIN HCl ER (Janumet XR) 100-1000 MG tablet, Take 1 p.o. daily for diabetes.  Take with food., Disp: 90 tablet, Rfl: 3    vitamin C (ASCORBIC ACID) 500 MG tablet, Take 1 tablet by mouth Daily., Disp: , Rfl:     Zinc 50 MG capsule, Take 1 capsule by mouth Daily. (Patient not taking: Reported on 8/5/2024), Disp: , Rfl:     ALLERGIES:   No Known Allergies    SOCIAL HISTORY:       Social History     Socioeconomic History    Marital status:    Tobacco Use    Smoking status: Former     Types: Cigars     Quit date: 2021     Years since quitting: 3.5    Smokeless tobacco: Never   Vaping Use    Vaping status: Never Used   Substance and Sexual Activity    Alcohol use: Not Currently     Comment: occasional    Drug use: No    Sexual activity: Yes     Partners: Female         FAMILY HISTORY:  Family History   Problem Relation Age of Onset    Coronary artery disease Mother     Diabetes Mother     Hypertension Mother         Essential    Hyperlipidemia Mother     Diabetes Father     Hypertension Father         Essential    Hyperlipidemia Father     Coronary artery disease Brother     Diabetes Brother     Hypertension Brother         Essential    Hyperlipidemia Brother     Malig Hyperthermia Neg Hx        REVIEW OF SYSTEMS:  Review of Systems   Constitutional: Negative.    Respiratory: Negative.     Cardiovascular: Negative.     Gastrointestinal: Negative.    Genitourinary: Negative.    Musculoskeletal: Negative.    Hematological: Negative.               Vitals:    08/05/24 1102   BP: 110/64   Pulse: 65   Temp: 98.5 °F (36.9 °C)   TempSrc: Oral   SpO2: 95%   Weight: 76.6 kg (168 lb 12.8 oz)   PainSc: 0-No pain         8/5/2024    11:00 AM   Current Status   ECOG score 0        PHYSICAL EXAM:    CONSTITUTIONAL:  Vital signs reviewed.  No distress, looks comfortable.  RESPIRATORY:  Normal respiratory effort.  Lungs clear to auscultation bilaterally.  CARDIOVASCULAR:  Normal S1, S2.  No murmurs rubs or gallops.  No significant lower extremity edema.  GASTROINTESTINAL: Abdomen appears unremarkable.  Nontender.   SKIN:  Warm.  No rashes.  PSYCHIATRIC:  Normal judgment and insight.  Normal mood and affect.     RECENT LABS:        WBC   Date Value Ref Range Status   08/05/2024 13.82 (H) 3.40 - 10.80 10*3/mm3 Final   03/22/2024 16.77 (H) 3.40 - 10.80 10*3/mm3 Final   06/15/2023 13.93 (H) 3.40 - 10.80 10*3/mm3 Final   03/13/2023 9.80 3.40 - 10.80 10*3/mm3 Final   03/09/2023 10.40 3.40 - 10.80 10*3/mm3 Final   12/15/2022 12.96 (H) 3.40 - 10.80 10*3/mm3 Final   06/14/2022 14.87 (H) 3.40 - 10.80 10*3/mm3 Final   01/26/2022 10.49 3.40 - 10.80 10*3/mm3 Final   01/01/2022 8.30 3.40 - 10.80 10*3/mm3 Final   12/31/2021 8.10 3.40 - 10.80 10*3/mm3 Final   12/08/2021 12.94 (H) 3.40 - 10.80 10*3/mm3 Final   06/07/2021 13.48 (H) 3.40 - 10.80 10*3/mm3 Final   05/26/2020 9.67 3.40 - 10.80 10*3/mm3 Final   05/09/2019 7.83 3.40 - 10.80 10*3/mm3 Final   05/02/2018 8.05 4.50 - 10.70 10*3/mm3 Final   10/27/2017 6.24 4.50 - 10.70 10*3/mm3 Final   12/09/2016 8.48 4.50 - 10.70 10*3/mm3 Final   11/06/2015 10.18 4.50 - 10.70 K/Cumm Final   05/14/2015 0-2 /hpf Final     Comment:     REFERENCE RANGE: None Seen,0-2   05/14/2015 9.36 4.50 - 10.70 K/Cumm Final     Hemoglobin   Date Value Ref Range Status   08/05/2024 16.2 13.0 - 17.7 g/dL Final   03/22/2024 15.6 13.0 - 17.7  g/dL Final   06/15/2023 15.3 13.0 - 17.7 g/dL Final   03/13/2023 15.2 13.0 - 17.7 g/dL Final   03/09/2023 14.9 13.0 - 17.7 g/dL Final   12/15/2022 15.4 13.0 - 17.7 g/dL Final   06/14/2022 16.3 13.0 - 17.7 g/dL Final   01/26/2022 16.2 13.0 - 17.7 g/dL Final   01/01/2022 13.7 13.0 - 17.7 g/dL Final   12/31/2021 13.4 13.0 - 17.7 g/dL Final   12/08/2021 16.2 13.0 - 17.7 g/dL Final   06/07/2021 16.6 13.0 - 17.7 g/dL Final   05/26/2020 16.3 13.0 - 17.7 g/dL Final   05/09/2019 15.9 13.0 - 17.7 g/dL Final   05/02/2018 15.4 13.7 - 17.6 g/dL Final   10/27/2017 15.3 13.7 - 17.6 g/dL Final   12/09/2016 14.9 13.7 - 17.6 g/dL Final   11/06/2015 14.5 13.7 - 17.6 g/dL Final   05/14/2015 14.2 13.7 - 17.6 g/dL Final     Platelets   Date Value Ref Range Status   08/05/2024 184 140 - 450 10*3/mm3 Final   03/22/2024 222 140 - 450 10*3/mm3 Final   06/15/2023 216 140 - 450 10*3/mm3 Final   03/13/2023 192 140 - 450 10*3/mm3 Final   03/09/2023 193 140 - 450 10*3/mm3 Final   12/15/2022 207 140 - 450 10*3/mm3 Final   06/14/2022 225 140 - 450 10*3/mm3 Final   01/26/2022 214 140 - 450 10*3/mm3 Final   01/01/2022 180 140 - 450 10*3/mm3 Final   12/31/2021 205 140 - 450 10*3/mm3 Final   12/08/2021 214 140 - 450 10*3/mm3 Final   06/07/2021 202 140 - 450 10*3/mm3 Final   05/26/2020 199 140 - 450 10*3/mm3 Final   05/09/2019 216 140 - 450 10*3/mm3 Final   05/02/2018 204 140 - 500 10*3/mm3 Final   10/27/2017 195 140 - 500 10*3/mm3 Final   12/09/2016 194 140 - 500 10*3/mm3 Final   11/06/2015 229 140 - 500 K/Cumm Final   05/14/2015 217 140 - 500 K/Cumm Final     Assessment:  *Mild neutrophilic leukocytosis, chronic  *Smoking  Upon lab review he has had intermittent leukocytosis since June 2021 with WBC ranging between 12-16; absolute neutrophil count ranging between 9.2-13.2  8/5/2024: WBC 13.8, ANC 10.3, absolute monocytes 0.97.  No B symptoms.  Suspect neutrophilic leukocytosis is reactive secondary to smoking.  Counseled on smoking  cessation    Plan  Will obtain peripheral blood flow cytometry, BCR-ABL, JAK2 with reflex to MPL and CALR  Counseled on smoking cessation  MD visit with repeat CBC in 4 months  Will call with results of testing done today

## 2024-08-05 ENCOUNTER — CONSULT (OUTPATIENT)
Dept: ONCOLOGY | Facility: CLINIC | Age: 64
End: 2024-08-05
Payer: COMMERCIAL

## 2024-08-05 ENCOUNTER — PRIOR AUTHORIZATION (OUTPATIENT)
Dept: ONCOLOGY | Facility: CLINIC | Age: 64
End: 2024-08-05
Payer: COMMERCIAL

## 2024-08-05 ENCOUNTER — LAB (OUTPATIENT)
Dept: LAB | Facility: HOSPITAL | Age: 64
End: 2024-08-05
Payer: COMMERCIAL

## 2024-08-05 VITALS
WEIGHT: 168.8 LBS | HEART RATE: 65 BPM | SYSTOLIC BLOOD PRESSURE: 110 MMHG | BODY MASS INDEX: 24.22 KG/M2 | DIASTOLIC BLOOD PRESSURE: 64 MMHG | TEMPERATURE: 98.5 F | OXYGEN SATURATION: 95 %

## 2024-08-05 DIAGNOSIS — D72.9 NEUTROPHILIC LEUKOCYTOSIS: Primary | ICD-10-CM

## 2024-08-05 DIAGNOSIS — D72.821 MONOCYTOSIS: ICD-10-CM

## 2024-08-05 DIAGNOSIS — R79.89 ABNORMAL CBC: Primary | ICD-10-CM

## 2024-08-05 DIAGNOSIS — D72.9 NEUTROPHILIC LEUKOCYTOSIS: ICD-10-CM

## 2024-08-05 LAB
BASOPHILS # BLD AUTO: 0.09 10*3/MM3 (ref 0–0.2)
BASOPHILS NFR BLD AUTO: 0.7 % (ref 0–1.5)
DEPRECATED RDW RBC AUTO: 47.4 FL (ref 37–54)
EOSINOPHIL # BLD AUTO: 0.22 10*3/MM3 (ref 0–0.4)
EOSINOPHIL NFR BLD AUTO: 1.6 % (ref 0.3–6.2)
ERYTHROCYTE [DISTWIDTH] IN BLOOD BY AUTOMATED COUNT: 14.1 % (ref 12.3–15.4)
HCT VFR BLD AUTO: 47.7 % (ref 37.5–51)
HGB BLD-MCNC: 16.2 G/DL (ref 13–17.7)
IMM GRANULOCYTES # BLD AUTO: 0.06 10*3/MM3 (ref 0–0.05)
IMM GRANULOCYTES NFR BLD AUTO: 0.4 % (ref 0–0.5)
LYMPHOCYTES # BLD AUTO: 2.16 10*3/MM3 (ref 0.7–3.1)
LYMPHOCYTES NFR BLD AUTO: 15.6 % (ref 19.6–45.3)
MCH RBC QN AUTO: 30.9 PG (ref 26.6–33)
MCHC RBC AUTO-ENTMCNC: 34 G/DL (ref 31.5–35.7)
MCV RBC AUTO: 91 FL (ref 79–97)
MONOCYTES # BLD AUTO: 0.97 10*3/MM3 (ref 0.1–0.9)
MONOCYTES NFR BLD AUTO: 7 % (ref 5–12)
NEUTROPHILS NFR BLD AUTO: 10.32 10*3/MM3 (ref 1.7–7)
NEUTROPHILS NFR BLD AUTO: 74.7 % (ref 42.7–76)
NRBC BLD AUTO-RTO: 0 /100 WBC (ref 0–0.2)
PLATELET # BLD AUTO: 184 10*3/MM3 (ref 140–450)
PMV BLD AUTO: 11.2 FL (ref 6–12)
RBC # BLD AUTO: 5.24 10*6/MM3 (ref 4.14–5.8)
WBC NRBC COR # BLD AUTO: 13.82 10*3/MM3 (ref 3.4–10.8)

## 2024-08-05 PROCEDURE — 99203 OFFICE O/P NEW LOW 30 MIN: CPT | Performed by: STUDENT IN AN ORGANIZED HEALTH CARE EDUCATION/TRAINING PROGRAM

## 2024-08-05 PROCEDURE — 88185 FLOWCYTOMETRY/TC ADD-ON: CPT

## 2024-08-05 PROCEDURE — 88184 FLOWCYTOMETRY/ TC 1 MARKER: CPT

## 2024-08-05 PROCEDURE — 85025 COMPLETE CBC W/AUTO DIFF WBC: CPT

## 2024-08-05 PROCEDURE — 88182 CELL MARKER STUDY: CPT

## 2024-08-05 PROCEDURE — 36415 COLL VENOUS BLD VENIPUNCTURE: CPT

## 2024-08-05 NOTE — TELEPHONE ENCOUNTER
No PA required for Flow [id number], BCR/ABL Fish [id number], Jak2 Mut 37039, Jak2 Reflex Exon12 [id number], MPL 66819, and CALR 08002 per [facility]. Tracking # [id number]. Ok'd lab to send to [facility].

## 2024-08-11 DIAGNOSIS — M17.12 PRIMARY OSTEOARTHRITIS OF LEFT KNEE: ICD-10-CM

## 2024-08-11 DIAGNOSIS — M25.562 ACUTE PAIN OF LEFT KNEE: ICD-10-CM

## 2024-08-12 RX ORDER — MELOXICAM 15 MG/1
15 TABLET ORAL DAILY
Qty: 30 TABLET | Refills: 0 | Status: SHIPPED | OUTPATIENT
Start: 2024-08-12

## 2024-09-03 LAB
REF LAB TEST METHOD: NORMAL

## 2024-09-04 LAB — REF LAB TEST METHOD: NORMAL

## 2024-09-09 DIAGNOSIS — M17.12 PRIMARY OSTEOARTHRITIS OF LEFT KNEE: ICD-10-CM

## 2024-09-09 DIAGNOSIS — M25.562 ACUTE PAIN OF LEFT KNEE: ICD-10-CM

## 2024-09-10 RX ORDER — MELOXICAM 15 MG/1
15 TABLET ORAL DAILY
Qty: 30 TABLET | Refills: 3 | Status: SHIPPED | OUTPATIENT
Start: 2024-09-10

## 2024-10-29 DIAGNOSIS — R80.9 TYPE 2 DIABETES MELLITUS WITH MICROALBUMINURIA, WITHOUT LONG-TERM CURRENT USE OF INSULIN: Chronic | ICD-10-CM

## 2024-10-29 DIAGNOSIS — E11.29 TYPE 2 DIABETES MELLITUS WITH MICROALBUMINURIA, WITHOUT LONG-TERM CURRENT USE OF INSULIN: Chronic | ICD-10-CM

## 2024-10-29 RX ORDER — SITAGLIPTIN AND METFORMIN HYDROCHLORIDE 1000; 100 MG/1; MG/1
TABLET, FILM COATED, EXTENDED RELEASE ORAL
Qty: 90 TABLET | Refills: 3 | Status: SHIPPED | OUTPATIENT
Start: 2024-10-29

## 2024-11-05 ENCOUNTER — LAB (OUTPATIENT)
Dept: LAB | Facility: HOSPITAL | Age: 64
End: 2024-11-05
Payer: COMMERCIAL

## 2024-11-05 PROCEDURE — 82550 ASSAY OF CK (CPK): CPT | Performed by: INTERNAL MEDICINE

## 2024-11-05 PROCEDURE — 84481 FREE ASSAY (FT-3): CPT | Performed by: INTERNAL MEDICINE

## 2024-11-05 PROCEDURE — 82306 VITAMIN D 25 HYDROXY: CPT | Performed by: INTERNAL MEDICINE

## 2024-11-05 PROCEDURE — 84439 ASSAY OF FREE THYROXINE: CPT | Performed by: INTERNAL MEDICINE

## 2024-11-05 PROCEDURE — 83036 HEMOGLOBIN GLYCOSYLATED A1C: CPT | Performed by: INTERNAL MEDICINE

## 2024-11-05 PROCEDURE — 80050 GENERAL HEALTH PANEL: CPT | Performed by: INTERNAL MEDICINE

## 2024-11-05 PROCEDURE — 84153 ASSAY OF PSA TOTAL: CPT | Performed by: INTERNAL MEDICINE

## 2024-11-05 PROCEDURE — 83704 LIPOPROTEIN BLD QUAN PART: CPT | Performed by: INTERNAL MEDICINE

## 2024-11-05 PROCEDURE — 82570 ASSAY OF URINE CREATININE: CPT | Performed by: INTERNAL MEDICINE

## 2024-11-05 PROCEDURE — 80061 LIPID PANEL: CPT | Performed by: INTERNAL MEDICINE

## 2024-11-05 PROCEDURE — 81003 URINALYSIS AUTO W/O SCOPE: CPT | Performed by: INTERNAL MEDICINE

## 2024-11-05 PROCEDURE — 82043 UR ALBUMIN QUANTITATIVE: CPT | Performed by: INTERNAL MEDICINE

## 2024-11-20 ENCOUNTER — OFFICE VISIT (OUTPATIENT)
Dept: INTERNAL MEDICINE | Facility: CLINIC | Age: 64
End: 2024-11-20
Payer: COMMERCIAL

## 2024-11-20 VITALS
TEMPERATURE: 98.4 F | DIASTOLIC BLOOD PRESSURE: 72 MMHG | HEIGHT: 70 IN | BODY MASS INDEX: 25.91 KG/M2 | RESPIRATION RATE: 16 BRPM | SYSTOLIC BLOOD PRESSURE: 116 MMHG | WEIGHT: 181 LBS | HEART RATE: 65 BPM | OXYGEN SATURATION: 97 %

## 2024-11-20 DIAGNOSIS — E03.9 PRIMARY HYPOTHYROIDISM: Chronic | ICD-10-CM

## 2024-11-20 DIAGNOSIS — R80.9 MICROALBUMINURIA: Chronic | ICD-10-CM

## 2024-11-20 DIAGNOSIS — Z23 NEED FOR INFLUENZA VACCINATION: ICD-10-CM

## 2024-11-20 DIAGNOSIS — R80.9 TYPE 2 DIABETES MELLITUS WITH MICROALBUMINURIA, WITHOUT LONG-TERM CURRENT USE OF INSULIN: Chronic | ICD-10-CM

## 2024-11-20 DIAGNOSIS — M17.12 PRIMARY OSTEOARTHRITIS OF LEFT KNEE: Chronic | ICD-10-CM

## 2024-11-20 DIAGNOSIS — H81.13 BENIGN PAROXYSMAL POSITIONAL VERTIGO DUE TO BILATERAL VESTIBULAR DISORDER: ICD-10-CM

## 2024-11-20 DIAGNOSIS — D72.828 NEUTROPHILIC LEUKOCYTOSIS: Chronic | ICD-10-CM

## 2024-11-20 DIAGNOSIS — E55.9 VITAMIN D DEFICIENCY: Chronic | ICD-10-CM

## 2024-11-20 DIAGNOSIS — Z87.891 FORMER CIGARETTE SMOKER: Chronic | ICD-10-CM

## 2024-11-20 DIAGNOSIS — Z51.81 THERAPEUTIC DRUG MONITORING: ICD-10-CM

## 2024-11-20 DIAGNOSIS — M77.8 TENDINITIS OF RIGHT FOREARM: ICD-10-CM

## 2024-11-20 DIAGNOSIS — Z00.00 ROUTINE PHYSICAL EXAMINATION: Primary | ICD-10-CM

## 2024-11-20 DIAGNOSIS — K57.30 DIVERTICULOSIS OF COLON: Chronic | ICD-10-CM

## 2024-11-20 DIAGNOSIS — E11.29 TYPE 2 DIABETES MELLITUS WITH MICROALBUMINURIA, WITHOUT LONG-TERM CURRENT USE OF INSULIN: Chronic | ICD-10-CM

## 2024-11-20 DIAGNOSIS — E11.9 ENCOUNTER FOR DIABETIC FOOT EXAM: Chronic | ICD-10-CM

## 2024-11-20 DIAGNOSIS — E78.2 MIXED HYPERLIPIDEMIA: Chronic | ICD-10-CM

## 2024-11-20 PROBLEM — D48.5 NEOPLASM OF UNCERTAIN BEHAVIOR OF SKIN: Status: RESOLVED | Noted: 2024-03-29 | Resolved: 2024-11-20

## 2024-11-20 RX ORDER — GLUCOSAMINE HCL 500 MG
TABLET ORAL
Start: 2024-11-20

## 2024-11-20 NOTE — PROGRESS NOTES
11/20/2024    Patient Information  Prudencio Mckinley                                                                                          [address] [facility] [place] [address]      1960  [unfilled]  [phone] (work)    Chief Complaint:     Routine annual physical examination/preventative visit.    History of Present Illness:    Patient with medical problems as noted below in assessment and plan presents today for his routine annual physical/preventative visit.  Patient had lab work prior to this visit in order to monitor his chronic medical issues.  Patient has 2 new complaints as discussed below in the addendum.  His past medical history reviewed and updated were necessary including health maintenance parameters.  This reveals he may need a diabetic eye exam.  See below.    Review of Systems   Constitutional: Negative.   HENT: Negative.     Eyes: Negative.    Cardiovascular: Negative.  Negative for near-syncope, palpitations and syncope.   Respiratory: Negative.     Endocrine: Negative.    Hematologic/Lymphatic: Negative.    Skin: Negative.    Musculoskeletal: Negative.    Gastrointestinal: Negative.    Genitourinary: Negative.    Neurological:  Positive for loss of balance and vertigo. Negative for focal weakness, numbness and paresthesias.   Psychiatric/Behavioral: Negative.     Allergic/Immunologic: Negative.        Active Problems:    Patient Active Problem List   Diagnosis    Hiatal hernia    Hyperlipidemia    Primary hypothyroidism    Microalbuminuria    Type 2 diabetes mellitus with microalbuminuria, without long-term current use of insulin    Vitamin D deficiency    Therapeutic drug monitoring    Routine physical examination    Diabetic foot exam    Diabetic eye exam    Former cigarette smoker    Neutrophilic leukocytosis    Primary osteoarthritis of left knee    Diverticulosis of colon    Benign paroxysmal positional vertigo due to bilateral vestibular disorder    Tendinitis of  right forearm         Past Medical History:   Diagnosis Date    Diverticulosis of colon 11/20/2024    Former cigarette smoker 06/22/2022    Hiatal hernia 07/28/2011 07/28/2011--3 cm hiatal hernia, otherwise normal EGD.    History of diverticulitis of colon 09/29/2023 March 2023--presented with left lower quadrant pain.  CT scan consistent with diverticulitis.  Rated successfully with antibiotics.    History of nephrolithiasis Approximately 1985    Approximately 1985--patient passed a kidney stone stone spontaneously.    History of peptic ulcer Approximately 1979    Approximately 1979--patient was told he had a peptic ulcer and was placed on Mylanta.  It sounds as if he had an EGD.    Hyperlipidemia 06/22/2012 06/22/2012--treatment for hyperlipidemia begun.    Hypothyroidism 04/27/2012 05/04/2012--treatment for hypothyroidism begun.  05/01/2012--ultrasound of the thyroid revealed increased hypervascularity, slightly heterogeneous echo.   04/27/2012--initial diagnosis of hypothyroidism.    Microalbuminuria 05/15/2015    05/15/2015--urine microalbumin slightly elevated at 19.4. Normal range 0.0--17.0.    Neutrophilic leukocytosis 06/22/2022    Primary hypothyroidism 04/27/2012 05/04/2012--treatment for hypothyroidism begun.     05/01/2012--ultrasound of the thyroid revealed increased hypervascularity, slightly heterogeneous echo.      04/27/2012--initial diagnosis of hypothyroidism.    Primary osteoarthritis of left knee 12/12/2023    Primary osteoarthritis of left knee 12/12/2023 December 12, 2023--x-ray of the left knee:           Narrative & Impression      XR KNEE 1 OR 2 VW LEFT-     HISTORY: 63-year-old male with acute left knee pain.     FINDINGS: There are moderately advanced osteoarthritic changes at the  medial tibiofemoral compartment. There is no joint effusion or acute  abnormality.     This report was finalized on 12/13/2023 10:36 AM by Dr. Jana Garsia M.D  on     Type 2 diabetes mellitus  with microalbuminuria, without long-term current use of insulin 04/01/2011    05/10/2013--initial diagnosis of mild diabetes. Hemoglobin A1c 6.6.     04/01/2011--mild impaired fasting glucose.           Vitamin D deficiency 03/21/2016         Past Surgical History:   Procedure Laterality Date    CARDIAC CATHETERIZATION  2010    COLONOSCOPY  07/28/2011 07/28/2011--normal colonoscopy with an excellent prep.    ESOPHAGOSCOPY / EGD  07/28/2011 07/28/2011--3 cm hiatal hernia, otherwise normal EGD.    INGUINAL HERNIA REPAIR Left 03/30/2023    Procedure: laparoscopic left inguinal hernia repair possible bilateral and umbilical hernia repair;  Surgeon: Kelsy Bejarano DO;  Location:  [facility];  Service: General;  Laterality: Left;    INGUINAL HERNIA REPAIR  O4/2023    ROTATOR CUFF REPAIR Left 02/04/2022 February 4, 2022--left rotator cuff surgery.    TONSILLECTOMY      11 years old.    UMBILICAL HERNIA REPAIR N/A 03/30/2023    Procedure: UMBILICAL HERNIA REPAIR;  Surgeon: Kelsy Bejarano DO;  Location:  [facility];  Service: General;  Laterality: N/A;         No Known Allergies        Current Outpatient Medications:     atorvastatin (LIPITOR) 20 MG tablet, Take 1 p.o. daily for high cholesterol as directed, Disp: 90 tablet, Rfl: 3    ezetimibe (ZETIA) 10 MG tablet, Take 1 p.o. daily for high cholesterol as directed, Disp: 90 tablet, Rfl: 3    levothyroxine (SYNTHROID, LEVOTHROID) 100 MCG tablet, TAKE 1 TABLET BY MOUTH EVERY DAY, Disp: 90 tablet, Rfl: 3    MAGNESIUM PO, Take 150 mg by mouth Daily., Disp: , Rfl:     meloxicam (MOBIC) 15 MG tablet, TAKE 1 TABLET BY MOUTH DAILY, Disp: 30 tablet, Rfl: 3    multivitamin (THERAGRAN) tablet tablet, Take 1 tablet by mouth Daily., Disp: , Rfl:     Psyllium 43 % powder, Take 2 teaspoon(s) by mouth Daily., Disp: , Rfl:     SITagliptin-metFORMIN HCl ER (Janumet XR) 100-1000 MG tablet, TAKE 1 TABLET BY MOUTH DAILY WITH FOOD FOR DIABETES, Disp: 90 tablet, Rfl: 3     "vitamin C (ASCORBIC ACID) 500 MG tablet, Take 1 tablet by mouth Daily., Disp: , Rfl:     Cholecalciferol (Vitamin D3) 75 MCG (3000 UT) tablet, Take 3000 international units of vitamin D daily for low vitamin D., Disp: , Rfl:     Diclofenac Sodium (VOLTAREN) 1 % gel gel, Apply as directed to the right arm affected areas 3 times daily, Disp: 50 g, Rfl: 2      Family History   Problem Relation Age of Onset    Coronary artery disease Mother     Diabetes Mother     Hypertension Mother         Essential    Hyperlipidemia Mother     Diabetes Father     Hypertension Father         Essential    Hyperlipidemia Father     Coronary artery disease Brother     Diabetes Brother     Hypertension Brother         Essential    Hyperlipidemia Brother     Malig Hyperthermia Neg Hx          Social History     Socioeconomic History    Marital status:    Tobacco Use    Smoking status: Former     Current packs/day: 0.00     Types: Cigars, Cigarettes     Quit date: 1/1/2021     Years since quitting: 3.8    Smokeless tobacco: Never   Vaping Use    Vaping status: Never Used   Substance and Sexual Activity    Alcohol use: Not Currently     Comment: occasional    Drug use: No    Sexual activity: Yes     Partners: Female         Vitals:    11/20/24 1143   BP: 116/72   Pulse: 65   Resp: 16   Temp: 98.4 °F (36.9 °C)   TempSrc: Oral   SpO2: 97%   Weight: 82.1 kg (181 lb)   Height: 177.8 cm (70\")        Body mass index is 25.97 kg/m².      Physical Exam:    General: Alert and oriented x 3.  No acute distress.  Normal affect.  HEENT: Pupils equal, round, reactive to light; extraocular movements intact; sclerae nonicteric; pharynx, ear canals and TMs normal.  Neck: Without JVD, thyromegaly, bruit, or adenopathy.  Lungs: Clear to auscultation in all fields.  Heart: Regular rate and rhythm without murmur, rub, gallop, or click.  Abdomen: Soft, nontender, without hepatosplenomegaly or hernia.  Bowel sounds normal.  : Deferred.  Rectal: Deferred. "  Extremities: Without clubbing, cyanosis, edema, or pulse deficit.  Neurologic: Intact without focal deficit.  Normal station and gait observed during ingress and egress from the examination room.  Skin: Without significant lesion.  Musculoskeletal: Unremarkable.    November 20, 2024--routine diabetic foot exam reveals no evidence of diabetic foot ulcer or preulcerative callus.  Distal pulses palpable.  Sensation intact.    Lab/other results:    CK normal at 126.  CMP normal except glucose 112, potassium mildly elevated at 5.4.  Hemoglobin A1c 6.41.  Total cholesterol 111, triglycerides 60, LDL particle #634, HDL particle #39.5.  Microalbumin/creatinine ratio reveals no microalbuminuria.  Vitamin D is a little low at 27.8.  Thyroid function tests are normal.  PSA normal at 1.7.  CBC reveals an elevated white count of 12.86 with elevation of neutrophils and monocytes as well as eosinophils.  Immature granulocytes were noted.    Assessment/Plan:     Diagnosis Plan   1. Routine physical examination        2. Type 2 diabetes mellitus with microalbuminuria, without long-term current use of insulin  Comprehensive Metabolic Panel    Hemoglobin A1c      3. Microalbuminuria        4. Primary hypothyroidism  TSH    T4, Free    T3, Free      5. Hyperlipidemia  CK    NMR LipoProfile      6. Vitamin D deficiency  Vitamin D,25-Hydroxy    Cholecalciferol (Vitamin D3) 75 MCG (3000 UT) tablet      7. Neutrophilic leukocytosis  CBC & Differential      8. Diabetic foot exam        9. Former cigarette smoker        10. Primary osteoarthritis of left knee        11. Therapeutic drug monitoring        12. Diverticulosis of colon        13. Need for influenza vaccination        14. Tendinitis of right forearm  Diclofenac Sodium (VOLTAREN) 1 % gel gel      15. Benign paroxysmal positional vertigo due to bilateral vestibular disorder  Ambulatory Referral to Physical Therapy for Evaluation & Treatment        Patient presents for his routine  annual physical exam/preventative visit.  Patient has diabetes is under excellent control with Janumet extended release 100/1001 p.o. daily.  Microalbuminuria is under good control at the present time without medication.  His thyroid is therapeutic.  Hyperlipidemia is under good control with Lipitor and Zetia.  Vitamin D is low and I have recommended that patient take vitamin D supplementation on a regular basis.  He has neutrophilic leukocytosis which is mild and being evaluated by hematology.    Several preventative health issues discussed including review of vaccinations and recommendations, including dietary issues, exercise and weight loss.  Safe sex practices discussed.  Patient advised to wear seatbelt whenever driving and avoid texting and driving.  Also advised to look both ways before crossing the street.  Colon cancer prevention discussed and is up-to-date with colonoscopy.  Advised to avoid tobacco products and minimize alcohol consumption.    Plan is as follows: Strongly recommended patient to keep follow-up with hematology.  No changes in current medical regimen.  Patient will follow-up in 6 months with lab prior or follow-up as needed.  Recommend influenza vaccine.  Also have recommended patient get a diabetic eye exam.    Addendum: Patient has had a approximately 3-week history of right forearm pain that comes on with  and use.  Patient is  and uses tools quite a bit including Channel locks.  No known injury.  On exam there is no obvious redness or warmth or inflammation but there is some tenderness over the flexor tendons.  Patient has tendinitis.  He is taking meloxicam for his knee but I think it will be fine for him to add a topical diclofenac gel 2-3 times a day.  Also the need to try to avoid any strenuous activity involving the right forearm discussed.  Also patient has approximately 3 to 4-month history of intermittent episodes of dizziness which is an off-balance or spinning  sensation that comes on with certain movements like when he first gets up out of bed in the morning.  Patient does have to get on ladders quite a bit and his symptoms persist and I think he should be evaluated and treated at vestibular therapy.  Referral given.      Procedures

## 2024-11-27 DIAGNOSIS — E78.2 MIXED HYPERLIPIDEMIA: Chronic | ICD-10-CM

## 2024-11-27 RX ORDER — ATORVASTATIN CALCIUM 20 MG/1
TABLET, FILM COATED ORAL
Qty: 90 TABLET | Refills: 3 | Status: SHIPPED | OUTPATIENT
Start: 2024-11-27

## 2024-11-27 RX ORDER — EZETIMIBE 10 MG/1
TABLET ORAL
Qty: 90 TABLET | Refills: 3 | Status: SHIPPED | OUTPATIENT
Start: 2024-11-27

## 2024-12-02 NOTE — PROGRESS NOTES
"    Follow-up    12/3/2024, interval history:  Patient here for follow-up.  Continues to smoke.  No B symptoms.      HISTORY OF PRESENT ILLNESS:  The patient is a 64 y.o. year old male referred to our clinic for evaluation management of neutrophilic leukocytosis. upon lab review he has had intermittent leukocytosis since June 2021 with WBC ranging between 12-16; absolute neutrophil count ranging between 9.2-13.2.  He denies any B symptoms.  He smokes 2 to 3 cigars/day.  He has intentionally lost a few pounds over the last few years with dietary changes.  He is quite active.  No personal or family history of cancer except for skin cancers.       MEDICATIONS  Current Outpatient Medications   Medication Instructions    atorvastatin (LIPITOR) 20 MG tablet TAKE 1 TABLET BY MOUTH DAILY AS DIRECTED FOR HIGH CHOLESTEROL    Cholecalciferol (Vitamin D3) 75 MCG (3000 UT) tablet Take 3000 international units of vitamin D daily for low vitamin D.    Diclofenac Sodium (VOLTAREN) 1 % gel gel Apply as directed to the right arm affected areas 3 times daily    ezetimibe (ZETIA) 10 MG tablet TAKE 1 TABLET BY MOUTH DAILY AS DIRECTED FOR HIGH CHOLESTEROL    levothyroxine (SYNTHROID, LEVOTHROID) 100 MCG tablet TAKE 1 TABLET BY MOUTH EVERY DAY    MAGNESIUM  mg, Daily    meloxicam (MOBIC) 15 mg, Oral, Daily    multivitamin (THERAGRAN) tablet tablet 1 tablet, Daily    Psyllium 43 % powder 2 teaspoon(s), Daily    SITagliptin-metFORMIN HCl ER (Janumet XR) 100-1000 MG tablet TAKE 1 TABLET BY MOUTH DAILY WITH FOOD FOR DIABETES    vitamin C (ASCORBIC ACID) 500 mg, Daily         ALLERGIES:   No Known Allergies      I have reviewed Past Medical, Surgical History, Social History, Meds and Allergies.     REVIEW OF SYSTEMS:  See interval History         Vitals:    12/03/24 1336   BP: 128/76   Pulse: 62   Resp: 16   Temp: 98.1 °F (36.7 °C)   TempSrc: Oral   SpO2: 97%   Weight: 80.5 kg (177 lb 6.4 oz)   Height: 177.8 cm (70\")   PainSc: 0-No pain "           12/3/2024     1:35 PM   Current Status   ECOG score 0        PHYSICAL EXAM:    CONSTITUTIONAL:  Vital signs reviewed.  No distress, looks comfortable.  RESPIRATORY:  Normal respiratory effort.  Lungs clear to auscultation bilaterally.  CARDIOVASCULAR:  Normal S1, S2.  No murmurs rubs or gallops.  No significant lower extremity edema.  GASTROINTESTINAL: Abdomen appears unremarkable.  Nontender.   SKIN:  Warm.  No rashes.  PSYCHIATRIC:  Normal judgment and insight.  Normal mood and affect.    I have reexamined the patient and the results are consistent with the previously documented exam. Maggie Anaya MD        RECENT LABS:        WBC   Date Value Ref Range Status   12/03/2024 11.08 (H) 3.40 - 10.80 10*3/mm3 Final   11/05/2024 12.86 (H) 3.40 - 10.80 10*3/mm3 Final   08/05/2024 13.82 (H) 3.40 - 10.80 10*3/mm3 Final   03/22/2024 16.77 (H) 3.40 - 10.80 10*3/mm3 Final   06/15/2023 13.93 (H) 3.40 - 10.80 10*3/mm3 Final   03/13/2023 9.80 3.40 - 10.80 10*3/mm3 Final   03/09/2023 10.40 3.40 - 10.80 10*3/mm3 Final   12/15/2022 12.96 (H) 3.40 - 10.80 10*3/mm3 Final   06/14/2022 14.87 (H) 3.40 - 10.80 10*3/mm3 Final   01/26/2022 10.49 3.40 - 10.80 10*3/mm3 Final   01/01/2022 8.30 3.40 - 10.80 10*3/mm3 Final   12/31/2021 8.10 3.40 - 10.80 10*3/mm3 Final   12/08/2021 12.94 (H) 3.40 - 10.80 10*3/mm3 Final   06/07/2021 13.48 (H) 3.40 - 10.80 10*3/mm3 Final   05/26/2020 9.67 3.40 - 10.80 10*3/mm3 Final   05/09/2019 7.83 3.40 - 10.80 10*3/mm3 Final   05/02/2018 8.05 4.50 - 10.70 10*3/mm3 Final   10/27/2017 6.24 4.50 - 10.70 10*3/mm3 Final   12/09/2016 8.48 4.50 - 10.70 10*3/mm3 Final   11/06/2015 10.18 4.50 - 10.70 K/Cumm Final   05/14/2015 0-2 /hpf Final     Comment:     REFERENCE RANGE: None Seen,0-2   05/14/2015 9.36 4.50 - 10.70 K/Cumm Final     Hemoglobin   Date Value Ref Range Status   12/03/2024 15.9 13.0 - 17.7 g/dL Final   11/05/2024 15.1 13.0 - 17.7 g/dL Final   08/05/2024 16.2 13.0 - 17.7 g/dL Final    03/22/2024 15.6 13.0 - 17.7 g/dL Final   06/15/2023 15.3 13.0 - 17.7 g/dL Final   03/13/2023 15.2 13.0 - 17.7 g/dL Final   03/09/2023 14.9 13.0 - 17.7 g/dL Final   12/15/2022 15.4 13.0 - 17.7 g/dL Final   06/14/2022 16.3 13.0 - 17.7 g/dL Final   01/26/2022 16.2 13.0 - 17.7 g/dL Final   01/01/2022 13.7 13.0 - 17.7 g/dL Final   12/31/2021 13.4 13.0 - 17.7 g/dL Final   12/08/2021 16.2 13.0 - 17.7 g/dL Final   06/07/2021 16.6 13.0 - 17.7 g/dL Final   05/26/2020 16.3 13.0 - 17.7 g/dL Final   05/09/2019 15.9 13.0 - 17.7 g/dL Final   05/02/2018 15.4 13.7 - 17.6 g/dL Final   10/27/2017 15.3 13.7 - 17.6 g/dL Final   12/09/2016 14.9 13.7 - 17.6 g/dL Final   11/06/2015 14.5 13.7 - 17.6 g/dL Final   05/14/2015 14.2 13.7 - 17.6 g/dL Final     Platelets   Date Value Ref Range Status   12/03/2024 196 140 - 450 10*3/mm3 Final   11/05/2024 213 140 - 450 10*3/mm3 Final   08/05/2024 184 140 - 450 10*3/mm3 Final   03/22/2024 222 140 - 450 10*3/mm3 Final   06/15/2023 216 140 - 450 10*3/mm3 Final   03/13/2023 192 140 - 450 10*3/mm3 Final   03/09/2023 193 140 - 450 10*3/mm3 Final   12/15/2022 207 140 - 450 10*3/mm3 Final   06/14/2022 225 140 - 450 10*3/mm3 Final   01/26/2022 214 140 - 450 10*3/mm3 Final   01/01/2022 180 140 - 450 10*3/mm3 Final   12/31/2021 205 140 - 450 10*3/mm3 Final   12/08/2021 214 140 - 450 10*3/mm3 Final   06/07/2021 202 140 - 450 10*3/mm3 Final   05/26/2020 199 140 - 450 10*3/mm3 Final   05/09/2019 216 140 - 450 10*3/mm3 Final   05/02/2018 204 140 - 500 10*3/mm3 Final   10/27/2017 195 140 - 500 10*3/mm3 Final   12/09/2016 194 140 - 500 10*3/mm3 Final   11/06/2015 229 140 - 500 K/Cumm Final   05/14/2015 217 140 - 500 K/Cumm Final     Assessment:  *Mild neutrophilic leukocytosis, chronic  *Smoking  Upon lab review he has had intermittent leukocytosis since June 2021 with WBC ranging between 12-16; absolute neutrophil count ranging between 9.2-13.2  8/5/2024: WBC 13.8, ANC 10.3, absolute monocytes 0.97.  No B  symptoms.  Workup in August 2024-flow cytometry, BCR-ABL, JAK2, MPL and CALR - negative  12/3/2024: WBC continues to be elevated around 11.08.  Reviewed results of workup.  No MPN.  Mild leukocytosis likely reactive in the setting of smoking.  Smoking cessation recommended    Plan  Reviewed results of workup.  Suspect leukocytosis is reactive secondary to smoking.  Recommended smoking cessation.  No further hematologic interventions warranted.  He will see us on an as-needed basis

## 2024-12-03 ENCOUNTER — OFFICE VISIT (OUTPATIENT)
Dept: ONCOLOGY | Facility: CLINIC | Age: 64
End: 2024-12-03
Payer: COMMERCIAL

## 2024-12-03 ENCOUNTER — LAB (OUTPATIENT)
Dept: LAB | Facility: HOSPITAL | Age: 64
End: 2024-12-03
Payer: COMMERCIAL

## 2024-12-03 VITALS
DIASTOLIC BLOOD PRESSURE: 76 MMHG | OXYGEN SATURATION: 97 % | HEART RATE: 62 BPM | TEMPERATURE: 98.1 F | RESPIRATION RATE: 16 BRPM | BODY MASS INDEX: 25.4 KG/M2 | HEIGHT: 70 IN | WEIGHT: 177.4 LBS | SYSTOLIC BLOOD PRESSURE: 128 MMHG

## 2024-12-03 DIAGNOSIS — D72.828 NEUTROPHILIC LEUKOCYTOSIS: ICD-10-CM

## 2024-12-03 DIAGNOSIS — D72.821 MONOCYTOSIS: ICD-10-CM

## 2024-12-03 DIAGNOSIS — D72.828 NEUTROPHILIC LEUKOCYTOSIS: Primary | ICD-10-CM

## 2024-12-03 LAB
BASOPHILS # BLD AUTO: 0.07 10*3/MM3 (ref 0–0.2)
BASOPHILS NFR BLD AUTO: 0.6 % (ref 0–1.5)
DEPRECATED RDW RBC AUTO: 47 FL (ref 37–54)
EOSINOPHIL # BLD AUTO: 0.4 10*3/MM3 (ref 0–0.4)
EOSINOPHIL NFR BLD AUTO: 3.6 % (ref 0.3–6.2)
ERYTHROCYTE [DISTWIDTH] IN BLOOD BY AUTOMATED COUNT: 13.9 % (ref 12.3–15.4)
HCT VFR BLD AUTO: 48.2 % (ref 37.5–51)
HGB BLD-MCNC: 15.9 G/DL (ref 13–17.7)
IMM GRANULOCYTES # BLD AUTO: 0.04 10*3/MM3 (ref 0–0.05)
IMM GRANULOCYTES NFR BLD AUTO: 0.4 % (ref 0–0.5)
LYMPHOCYTES # BLD AUTO: 2.4 10*3/MM3 (ref 0.7–3.1)
LYMPHOCYTES NFR BLD AUTO: 21.7 % (ref 19.6–45.3)
MCH RBC QN AUTO: 30.1 PG (ref 26.6–33)
MCHC RBC AUTO-ENTMCNC: 33 G/DL (ref 31.5–35.7)
MCV RBC AUTO: 91.1 FL (ref 79–97)
MONOCYTES # BLD AUTO: 0.72 10*3/MM3 (ref 0.1–0.9)
MONOCYTES NFR BLD AUTO: 6.5 % (ref 5–12)
NEUTROPHILS NFR BLD AUTO: 67.2 % (ref 42.7–76)
NEUTROPHILS NFR BLD AUTO: 7.45 10*3/MM3 (ref 1.7–7)
NRBC BLD AUTO-RTO: 0 /100 WBC (ref 0–0.2)
PLATELET # BLD AUTO: 196 10*3/MM3 (ref 140–450)
PMV BLD AUTO: 10.8 FL (ref 6–12)
RBC # BLD AUTO: 5.29 10*6/MM3 (ref 4.14–5.8)
WBC NRBC COR # BLD AUTO: 11.08 10*3/MM3 (ref 3.4–10.8)

## 2024-12-03 PROCEDURE — 85025 COMPLETE CBC W/AUTO DIFF WBC: CPT

## 2024-12-03 PROCEDURE — 36415 COLL VENOUS BLD VENIPUNCTURE: CPT

## 2024-12-06 ENCOUNTER — TREATMENT (OUTPATIENT)
Dept: PHYSICAL THERAPY | Facility: CLINIC | Age: 64
End: 2024-12-06
Payer: COMMERCIAL

## 2024-12-06 DIAGNOSIS — H81.11 BPPV (BENIGN PAROXYSMAL POSITIONAL VERTIGO), RIGHT: Primary | ICD-10-CM

## 2024-12-06 DIAGNOSIS — R42 DIZZINESS: ICD-10-CM

## 2024-12-06 NOTE — PROGRESS NOTES
Physical Therapy Vestibular Initial Evaluation and Plan of Care  [facility]  [address], [address]  [place] [address]      Patient Name: Prudencio Mckinley       Patient MRN: [id number]  : 1960  PHYSICIAN: Lj Wong MD  NPI: [id number]                                     Date: 2024  Encounter Diagnoses   Name Primary?    BPPV (benign paroxysmal positional vertigo), right Yes    Dizziness        Subjective:    Intermittent dizziness of several months duration. Happens when walking and when getting out of bed in the morning. Climbs on ladders a lot for work. Denies nausea or headache. Symptoms occur several times a week, last for seconds. When symptoms happen he stops moving until symptoms clear.     Subjective Outcome Measures  Dizziness Handicap Inventory: Minimal Perception of having a handicap 20/100       Objective Testing:     Positional Testing  Positional Testing: With infrared goggles  Vertebrobasilar Artery Screen - Right: Negative  Vertebrobasilar Artery Screen - Left: Negative  [place]-Hallpike Right: Upbeat, right rotatory nystagmus  [place]-Hallpike Right Onset Time : 15 sec  Rosario-Hallpike Right Duration Time : 40 sec  Rosario-Hallpike Left: No nystagmus     Occulomotor Exam Fixation Present  Occular ROM: Normal  Spontaneous Nystagmus: Absent  Gaze-induced Nystagmus: Absent  VOR with Occulomotor Exam Fixation Absent   Spontaneous Nystagmus: Absent       THERAPY ASSESSMENT: Patient is a 64 y.o. male. Patient presents to physical therapy due to complaints of episodes of dizziness/vertigo that started several months ago.  Signs and symptoms are consistent with R PC BPPV.  Patient is a good candidate for physical therapy to address the following:    Functional Limitations: walking, difficulty moving, decreased ability to perform ADL's  Frequency/Duration: 1x per week for 6 weeks  PT interventions: canal repositioning procedure, home exercise program  Patient  agrees with POC: Yes     History # of Personal Factors and/or Comorbidities: MODERATE (1-2)  Examination of Body System(s): # of elements: LOW (1-2)  Clinical Presentation: EVOLVING  Clinical Decision Making: LOW       REHAB POTENTIAL: good            SHORT TERM GOALS: To be met in 2 weeks:  1. Patient is independent with HEP.  2. Patient will report at least 30% improvement in overall condition.  3. Patient will report no falls.  LONG TERM GOALS:To be met in 4 weeks:  1. Patient will report decreased disequilibrium/dizziness by at least 90% which demonstrates improved quality of life.  2. Patient will report no loss of balance with ADLs to demonstrate improved functional balance and reduced risk for falls.  3. Patient denies dizziness with daily activity especially with transitional movements.  4. DHI score is less than 10 to demonstrate improved balance and dizziness.       Timed:         Neuromuscular Manuela:    0    mins  [id number]    Therapeutic Activity:     20     mins  [id number]         Untimed:  Low Eval     20     Mins  [id number]  Canalith Repos    8     mins  [id number]      Timed Treatment:   20   mins   Total Treatment:     48   mins      PT SIGNATURE: Rubi Lyon PT, DPT   [place] license #[id number]  DATE TREATMENT INITIATED: 12/6/2024     Initial Certification  Certification Period: 12/6/2024 thru 3/5/2025  I certify that the therapy services are furnished while this patient is under my care.  The services outlined above are required by this patient, and will be reviewed every 90 days.     PHYSICIAN: Lj Wong MD      DATE:     Please sign and return via fax to [phone].. Thank you, [facility].

## 2024-12-11 ENCOUNTER — TREATMENT (OUTPATIENT)
Dept: PHYSICAL THERAPY | Facility: CLINIC | Age: 64
End: 2024-12-11
Payer: COMMERCIAL

## 2024-12-11 DIAGNOSIS — R42 DIZZINESS: ICD-10-CM

## 2024-12-11 DIAGNOSIS — H81.11 BPPV (BENIGN PAROXYSMAL POSITIONAL VERTIGO), RIGHT: Primary | ICD-10-CM

## 2024-12-11 NOTE — PROGRESS NOTES
Physical Therapy Daily Progress Note-Vestibular Rehab  [facility] [place]  [address], [address]  [facility] [address]        Patient: Prudencio Mckinley   : 1960  Referring practitioner: Lj Wong MD  Date of Initial Visit: Type: THERAPY  Noted: 2024  Today's Date: 2024  Patient seen for 2 sessions       Visit Diagnoses:    ICD-10-CM ICD-9-CM   1. BPPV (benign paroxysmal positional vertigo), right  H81.11 386.11   2. Dizziness  R42 780.4           Subjective:  Prudencio Mckinley reports: symptoms are much improved      Objective     Positional Testing:     Positional Testing  Positional Testing: With infrared goggles  Vertebrobasilar Artery Screen - Right: Negative  Vertebrobasilar Artery Screen - Left: Negative  Rosario-Hallpike Right: Upbeat, right rotatory nystagmus  [place]-Hallpike Right Onset Time : 15 sec  Rosario-Hallpike Right Duration Time : 10 sec  Rosario-Hallpike Left:  (Did not test)  Horizontal Roll Test Right:  (Did not test)  Horizontal Roll Test Left:  (Did not test)        VOR with Occulomotor Exam Fixation Absent   Spontaneous Nystagmus: Absent       See Exercise and Vestibular Logs for complete testing and treatment.     S/P Rx provided pt was escorted to a chair with CGA to a full seated position.  Pt sat for 10 mins with head stationary.      Pt instructed not to lay down or do activities that change head level beyond 45 degrees from upright until laying down for bed for at least 2 to 3 hours.  Verbally discussed the treatment, reviewed the HEP (if given) and answered questions.        Assessment:  Pt with very mild nystagmus with R DHP, will continue Epley at home.       Plan:   Re-assess upon next visit for continued BPPV symptoms and treat appropriately after testing.           Timed:         Neuromuscular Manuela:     0    mins  73016;    Therapeutic Activity:      15     mins  04537;           Un-Timed:  Canalith Repositioninig        8    mins 65154      Timed  Treatment:   15   mins   Total Treatment:     23   mins        Rubi Lyon PT, DPT  Physical Therapist  [place] license # [id number]

## 2024-12-18 ENCOUNTER — TREATMENT (OUTPATIENT)
Dept: PHYSICAL THERAPY | Facility: CLINIC | Age: 64
End: 2024-12-18
Payer: COMMERCIAL

## 2024-12-18 DIAGNOSIS — R42 DIZZINESS: ICD-10-CM

## 2024-12-18 DIAGNOSIS — H81.11 BPPV (BENIGN PAROXYSMAL POSITIONAL VERTIGO), RIGHT: Primary | ICD-10-CM

## 2024-12-20 NOTE — PROGRESS NOTES
Physical Therapy Daily Progress Note-Vestibular Rehab  [facility] [place]  [address], [address]  [facility] [address]        Patient: Prudencio Mckinley   : 1960  Referring practitioner: Lj Wong MD  Date of Initial Visit: Type: THERAPY  Noted: 2024  Today's Date: 2024  Patient seen for 3 sessions       Visit Diagnoses:    ICD-10-CM ICD-9-CM   1. BPPV (benign paroxysmal positional vertigo), right  H81.11 386.11   2. Dizziness  R42 780.4           Subjective:  Prudencio Mckinley reports: symptoms are 80% better      Objective     Positional Testing:     Positional Testing  Positional Testing: With infrared goggles  Vertebrobasilar Artery Screen - Right: Negative  Vertebrobasilar Artery Screen - Left: Negative  [place]-Hallpike Right: Upbeat, right rotatory nystagmus  Rosario-Hallpike Right Onset Time : 5 sec  Rosario-Hallpike Right Duration Time : 5 sec  [place]-Hallpike Left:  (Did not test)  Horizontal Roll Test Right:  (Did not test)  Horizontal Roll Test Left:  (Did not test)        VOR with Occulomotor Exam Fixation Absent   Spontaneous Nystagmus: Absent       See Exercise and Vestibular Logs for complete testing and treatment.     S/P Rx provided pt was escorted to a chair with CGA to a full seated position.  Pt sat for 10 mins with head stationary.      Pt instructed not to lay down or do activities that change head level beyond 45 degrees from upright until laying down for bed for at least 2 to 3 hours.  Verbally discussed the treatment, reviewed the HEP (if given) and answered questions.        Assessment:  Pt with some mild nystagmus with R DHP, so will continue R Epley at home.       Plan:   Re-assess upon next visit for continued BPPV symptoms and treat appropriately after testing.           Timed:         Neuromuscular Manuela:     15    mins  49383;    Therapeutic Activity:      0     mins  71401;           Un-Timed:  Canalith Repositioninig        8    mins 44532      Timed  Treatment:   15   mins   Total Treatment:     23   mins        Rubi Lyon PT, DPT  Physical Therapist  [place] license # [id number]

## 2024-12-23 ENCOUNTER — TREATMENT (OUTPATIENT)
Dept: PHYSICAL THERAPY | Facility: CLINIC | Age: 64
End: 2024-12-23
Payer: COMMERCIAL

## 2024-12-23 DIAGNOSIS — H81.11 BPPV (BENIGN PAROXYSMAL POSITIONAL VERTIGO), RIGHT: Primary | ICD-10-CM

## 2024-12-23 DIAGNOSIS — R42 DIZZINESS: ICD-10-CM

## 2024-12-23 NOTE — PROGRESS NOTES
Physical Therapy Daily Progress Note-Vestibular Rehab  [facility] [place]  [address]  [facility] [address]        Patient: Prudencio Mckinley   : 1960  Referring practitioner: Lj Wong MD  Date of Initial Visit: Type: THERAPY  Noted: 2024  Today's Date: 2024  Patient seen for 4 sessions       Visit Diagnoses:    ICD-10-CM ICD-9-CM   1. BPPV (benign paroxysmal positional vertigo), right  H81.11 386.11   2. Dizziness  R42 780.4           Subjective:  Prudencio Mckinley reports: symptoms are 99% resolved.       Objective     Positional Testing:     Positional Testing  Positional Testing: With infrared goggles  Vertebrobasilar Artery Screen - Right: Negative  Vertebrobasilar Artery Screen - Left: Negative  Rosario-Hallpike Right: No nystagmus  Rosario-Hallpike Left:  (Did not test)  Horizontal Roll Test Right:  (Did not test)  Horizontal Roll Test Left:  (Did not test)        VOR with Occulomotor Exam Fixation Absent   Spontaneous Nystagmus: Absent       See Exercise and Vestibular Logs for complete testing and treatment.       Assessment:  BPPV appears resolved.       Plan:   Hold chart open 30 days in case of acute exacerbation, otherwise D/C.           Timed:         Neuromuscular Manuela:     0    mins  74716;    Therapeutic Activity:      10     mins  73358;           Un-Timed:  Canalith Repositioninig        0    mins 77706      Timed Treatment:   10   mins   Total Treatment:     10   mins        Rubi Lyon PT, DPT  Physical Therapist  [place] license # [id number]

## 2025-01-17 DIAGNOSIS — M17.12 PRIMARY OSTEOARTHRITIS OF LEFT KNEE: ICD-10-CM

## 2025-01-17 DIAGNOSIS — M25.562 ACUTE PAIN OF LEFT KNEE: ICD-10-CM

## 2025-01-17 RX ORDER — MELOXICAM 15 MG/1
15 TABLET ORAL DAILY
Qty: 30 TABLET | Refills: 3 | Status: SHIPPED | OUTPATIENT
Start: 2025-01-17

## 2025-05-12 DIAGNOSIS — E03.9 PRIMARY HYPOTHYROIDISM: Chronic | ICD-10-CM

## 2025-05-12 RX ORDER — LEVOTHYROXINE SODIUM 100 UG/1
100 TABLET ORAL DAILY
Qty: 90 TABLET | Refills: 3 | Status: SHIPPED | OUTPATIENT
Start: 2025-05-12

## 2025-05-28 ENCOUNTER — LAB (OUTPATIENT)
Dept: LAB | Facility: HOSPITAL | Age: 65
End: 2025-05-28
Payer: COMMERCIAL

## 2025-05-28 PROCEDURE — 84481 FREE ASSAY (FT-3): CPT | Performed by: INTERNAL MEDICINE

## 2025-05-28 PROCEDURE — 84439 ASSAY OF FREE THYROXINE: CPT | Performed by: INTERNAL MEDICINE

## 2025-05-28 PROCEDURE — 80061 LIPID PANEL: CPT | Performed by: INTERNAL MEDICINE

## 2025-05-28 PROCEDURE — 83704 LIPOPROTEIN BLD QUAN PART: CPT | Performed by: INTERNAL MEDICINE

## 2025-05-28 PROCEDURE — 80050 GENERAL HEALTH PANEL: CPT | Performed by: INTERNAL MEDICINE

## 2025-05-28 PROCEDURE — 82306 VITAMIN D 25 HYDROXY: CPT | Performed by: INTERNAL MEDICINE

## 2025-05-28 PROCEDURE — 82550 ASSAY OF CK (CPK): CPT | Performed by: INTERNAL MEDICINE

## 2025-05-28 PROCEDURE — 83036 HEMOGLOBIN GLYCOSYLATED A1C: CPT | Performed by: INTERNAL MEDICINE

## 2025-06-04 DIAGNOSIS — M25.562 ACUTE PAIN OF LEFT KNEE: ICD-10-CM

## 2025-06-04 DIAGNOSIS — M17.12 PRIMARY OSTEOARTHRITIS OF LEFT KNEE: ICD-10-CM

## 2025-06-04 RX ORDER — MELOXICAM 15 MG/1
15 TABLET ORAL DAILY
Qty: 30 TABLET | Refills: 3 | Status: SHIPPED | OUTPATIENT
Start: 2025-06-04

## 2025-06-12 ENCOUNTER — OFFICE VISIT (OUTPATIENT)
Dept: INTERNAL MEDICINE | Facility: CLINIC | Age: 65
End: 2025-06-12
Payer: COMMERCIAL

## 2025-06-12 VITALS
SYSTOLIC BLOOD PRESSURE: 140 MMHG | DIASTOLIC BLOOD PRESSURE: 70 MMHG | HEIGHT: 70 IN | WEIGHT: 175 LBS | BODY MASS INDEX: 25.05 KG/M2 | TEMPERATURE: 98.5 F | HEART RATE: 67 BPM | OXYGEN SATURATION: 96 % | RESPIRATION RATE: 16 BRPM

## 2025-06-12 DIAGNOSIS — E11.29 TYPE 2 DIABETES MELLITUS WITH MICROALBUMINURIA, WITHOUT LONG-TERM CURRENT USE OF INSULIN: Primary | Chronic | ICD-10-CM

## 2025-06-12 DIAGNOSIS — E55.9 VITAMIN D DEFICIENCY: Chronic | ICD-10-CM

## 2025-06-12 DIAGNOSIS — M77.8 TENDINITIS OF RIGHT FOREARM: ICD-10-CM

## 2025-06-12 DIAGNOSIS — E03.9 PRIMARY HYPOTHYROIDISM: Chronic | ICD-10-CM

## 2025-06-12 DIAGNOSIS — R80.9 TYPE 2 DIABETES MELLITUS WITH MICROALBUMINURIA, WITHOUT LONG-TERM CURRENT USE OF INSULIN: Primary | Chronic | ICD-10-CM

## 2025-06-12 DIAGNOSIS — R80.9 MICROALBUMINURIA: Chronic | ICD-10-CM

## 2025-06-12 DIAGNOSIS — D72.828 NEUTROPHILIC LEUKOCYTOSIS: Chronic | ICD-10-CM

## 2025-06-12 DIAGNOSIS — Z51.81 THERAPEUTIC DRUG MONITORING: ICD-10-CM

## 2025-06-12 DIAGNOSIS — E78.2 MIXED HYPERLIPIDEMIA: Chronic | ICD-10-CM

## 2025-06-12 DIAGNOSIS — M17.12 PRIMARY OSTEOARTHRITIS OF LEFT KNEE: Chronic | ICD-10-CM

## 2025-06-12 DIAGNOSIS — H81.13 BENIGN PAROXYSMAL POSITIONAL VERTIGO DUE TO BILATERAL VESTIBULAR DISORDER: ICD-10-CM

## 2025-06-12 DIAGNOSIS — Z00.00 ROUTINE PHYSICAL EXAMINATION: ICD-10-CM

## 2025-06-12 DIAGNOSIS — Z87.891 FORMER CIGARETTE SMOKER: Chronic | ICD-10-CM

## 2025-06-12 NOTE — PROGRESS NOTES
06/12/2025    Patient Information  Prudencio Mckinley                                                                                          [address] [facility] [place] [address]      1960  [unfilled]  [phone] (work)    Chief Complaint:     Follow-up chronic medical problems.  No new acute complaints.    History of Present Illness:    Patient with chronic medical problems as noted below in assessment plan presents today for follow-up with lab prior in order to monitor his chronic medical issues.  His past medical history reviewed and updated were necessary including health maintenance parameters.  This reveals he is currently up-to-date or else accounted for.    Review of Systems   Constitutional: Negative. Negative for chills, diaphoresis and fever.   HENT: Negative.  Negative for congestion and sore throat.    Eyes: Negative.    Cardiovascular: Negative.  Negative for chest pain.   Respiratory: Negative.  Negative for cough.    Endocrine: Negative.    Hematologic/Lymphatic: Negative.    Skin: Negative.  Negative for rash.   Musculoskeletal: Negative.  Negative for joint pain, myalgias and neck pain.   Gastrointestinal: Negative.  Negative for abdominal pain, anorexia, nausea and vomiting.   Genitourinary: Negative.  Negative for dysuria.   Neurological: Negative.  Negative for headaches, numbness, vertigo and weakness.   Psychiatric/Behavioral: Negative.     Allergic/Immunologic: Negative.        Active Problems:    Patient Active Problem List   Diagnosis    Hiatal hernia    Hyperlipidemia    Primary hypothyroidism    Microalbuminuria    Type 2 diabetes mellitus with microalbuminuria, without long-term current use of insulin    Vitamin D deficiency    Therapeutic drug monitoring    Routine physical examination    Diabetic foot exam    Diabetic eye exam    Former cigarette smoker    Neutrophilic leukocytosis    Primary osteoarthritis of left knee    Diverticulosis of colon    Tendinitis of  right forearm         Past Medical History:   Diagnosis Date    Diverticulosis of colon 11/20/2024    Former cigarette smoker 06/22/2022    Hiatal hernia 07/28/2011 07/28/2011--3 cm hiatal hernia, otherwise normal EGD.    History of diverticulitis of colon 09/29/2023 March 2023--presented with left lower quadrant pain.  CT scan consistent with diverticulitis.  Rated successfully with antibiotics.    History of nephrolithiasis Approximately 1985    Approximately 1985--patient passed a kidney stone stone spontaneously.    History of peptic ulcer Approximately 1979    Approximately 1979--patient was told he had a peptic ulcer and was placed on Mylanta.  It sounds as if he had an EGD.    Hyperlipidemia 06/22/2012 06/22/2012--treatment for hyperlipidemia begun.    Hypothyroidism 04/27/2012 05/04/2012--treatment for hypothyroidism begun.  05/01/2012--ultrasound of the thyroid revealed increased hypervascularity, slightly heterogeneous echo.   04/27/2012--initial diagnosis of hypothyroidism.    Microalbuminuria 05/15/2015    05/15/2015--urine microalbumin slightly elevated at 19.4. Normal range 0.0--17.0.    Neutrophilic leukocytosis 06/22/2022    Primary hypothyroidism 04/27/2012 05/04/2012--treatment for hypothyroidism begun.     05/01/2012--ultrasound of the thyroid revealed increased hypervascularity, slightly heterogeneous echo.      04/27/2012--initial diagnosis of hypothyroidism.    Primary osteoarthritis of left knee 12/12/2023    Primary osteoarthritis of left knee 12/12/2023 December 12, 2023--x-ray of the left knee:           Narrative & Impression      XR KNEE 1 OR 2 VW LEFT-     HISTORY: 63-year-old male with acute left knee pain.     FINDINGS: There are moderately advanced osteoarthritic changes at the  medial tibiofemoral compartment. There is no joint effusion or acute  abnormality.     This report was finalized on 12/13/2023 10:36 AM by Dr. Jana Garsia M.D  on     Type 2 diabetes mellitus  with microalbuminuria, without long-term current use of insulin 04/01/2011    05/10/2013--initial diagnosis of mild diabetes. Hemoglobin A1c 6.6.     04/01/2011--mild impaired fasting glucose.           Vitamin D deficiency 03/21/2016         Past Surgical History:   Procedure Laterality Date    CARDIAC CATHETERIZATION  2010    COLONOSCOPY  07/28/2011 07/28/2011--normal colonoscopy with an excellent prep.    ESOPHAGOSCOPY / EGD  07/28/2011 07/28/2011--3 cm hiatal hernia, otherwise normal EGD.    INGUINAL HERNIA REPAIR Left 03/30/2023    Procedure: laparoscopic left inguinal hernia repair possible bilateral and umbilical hernia repair;  Surgeon: Kelsy Bejarano DO;  Location:  [facility];  Service: General;  Laterality: Left;    INGUINAL HERNIA REPAIR  O4/2023    ROTATOR CUFF REPAIR Left 02/04/2022 February 4, 2022--left rotator cuff surgery.    TONSILLECTOMY      11 years old.    UMBILICAL HERNIA REPAIR N/A 03/30/2023    Procedure: UMBILICAL HERNIA REPAIR;  Surgeon: Kelsy Bejarano DO;  Location:  [facility];  Service: General;  Laterality: N/A;         No Known Allergies        Current Outpatient Medications:     atorvastatin (LIPITOR) 20 MG tablet, TAKE 1 TABLET BY MOUTH DAILY AS DIRECTED FOR HIGH CHOLESTEROL, Disp: 90 tablet, Rfl: 3    Cholecalciferol (Vitamin D3) 75 MCG (3000 UT) tablet, Take 3000 international units of vitamin D daily for low vitamin D., Disp: , Rfl:     Diclofenac Sodium (VOLTAREN) 1 % gel gel, Apply as directed to the right arm affected areas 3 times daily, Disp: 50 g, Rfl: 6    ezetimibe (ZETIA) 10 MG tablet, TAKE 1 TABLET BY MOUTH DAILY AS DIRECTED FOR HIGH CHOLESTEROL, Disp: 90 tablet, Rfl: 3    levothyroxine (SYNTHROID, LEVOTHROID) 100 MCG tablet, TAKE 1 TABLET BY MOUTH EVERY DAY, Disp: 90 tablet, Rfl: 3    MAGNESIUM PO, Take 150 mg by mouth Daily., Disp: , Rfl:     meloxicam (MOBIC) 15 MG tablet, TAKE 1 TABLET BY MOUTH DAILY, Disp: 30 tablet, Rfl: 3    multivitamin  "(THERAGRAN) tablet tablet, Take 1 tablet by mouth Daily., Disp: , Rfl:     Psyllium 43 % powder, Take 2 teaspoon(s) by mouth Daily., Disp: , Rfl:     SITagliptin-metFORMIN HCl ER (Janumet XR) 100-1000 MG tablet, TAKE 1 TABLET BY MOUTH DAILY WITH FOOD FOR DIABETES, Disp: 90 tablet, Rfl: 3    vitamin C (ASCORBIC ACID) 500 MG tablet, Take 1 tablet by mouth Daily., Disp: , Rfl:       Family History   Problem Relation Age of Onset    Coronary artery disease Mother     Diabetes Mother     Hypertension Mother         Essential    Hyperlipidemia Mother     Diabetes Father     Hypertension Father         Essential    Hyperlipidemia Father     Coronary artery disease Brother     Diabetes Brother     Hypertension Brother         Essential    Hyperlipidemia Brother     Malig Hyperthermia Neg Hx          Social History     Socioeconomic History    Marital status:    Tobacco Use    Smoking status: Former     Current packs/day: 0.00     Types: Cigars, Cigarettes     Quit date: 2021     Years since quittin.4    Smokeless tobacco: Never   Vaping Use    Vaping status: Never Used   Substance and Sexual Activity    Alcohol use: Not Currently     Comment: occasional    Drug use: No    Sexual activity: Yes     Partners: Female         Vitals:    25 1133   BP: 140/70   Pulse: 67   Resp: 16   Temp: 98.5 °F (36.9 °C)   TempSrc: Oral   SpO2: 96%   Weight: 79.4 kg (175 lb)   Height: 177.8 cm (70\")        Body mass index is 25.11 kg/m².      Physical Exam:    General: Alert and oriented x 3.  No acute distress.  Normal affect.  HEENT: Pupils equal, round, reactive to light; extraocular movements intact; sclerae nonicteric; pharynx, ear canals and TMs normal.  Neck: Without JVD, thyromegaly, bruit, or adenopathy.  Lungs: Clear to auscultation in all fields.  Heart: Regular rate and rhythm without murmur, rub, gallop, or click.  Abdomen: Soft, nontender, without hepatosplenomegaly or hernia.  Bowel sounds normal.  : " Deferred.  Rectal: Deferred.  Extremities: Without clubbing, cyanosis, edema, or pulse deficit.  Neurologic: Intact without focal deficit.  Normal station and gait observed during ingress and egress from the examination room.  Skin: Without significant lesion.  Musculoskeletal: Unremarkable.    Lab/other results:    CMP normal except glucose 120.  CK normal.  Hemoglobin A1c 6.43.  Total cholesterol 112, triglycerides 80, LDL particle #468, HDL particle #38.1.  Thyroid function tests are normal.  Vitamin D normal at 41.6.  CBC normal other than white count elevated at 13.52.    Assessment/Plan:     Diagnosis Plan   1. Type 2 diabetes mellitus with microalbuminuria, without long-term current use of insulin  Comprehensive Metabolic Panel    Hemoglobin A1c    Microalbumin / Creatinine Urine Ratio - Urine, Clean Catch    Urinalysis With Microscopic If Indicated (No Culture) - Urine, Clean Catch      2. Microalbuminuria  Microalbumin / Creatinine Urine Ratio - Urine, Clean Catch      3. Primary hypothyroidism  TSH    T4, Free    T3, Free      4. Hyperlipidemia  CK    Comprehensive Metabolic Panel    NMR LipoProfile      5. Vitamin D deficiency  Vitamin D,25-Hydroxy      6. Neutrophilic leukocytosis  CBC & Differential      7. Benign paroxysmal positional vertigo due to bilateral vestibular disorder        8. Former cigarette smoker        9. Primary osteoarthritis of left knee        10. Therapeutic drug monitoring  PSA DIAGNOSTIC      11. Routine physical examination  CBC & Differential    CK    Comprehensive Metabolic Panel    Hemoglobin A1c    Microalbumin / Creatinine Urine Ratio - Urine, Clean Catch    NMR LipoProfile    TSH    T4, Free    T3, Free    PSA DIAGNOSTIC    Urinalysis With Microscopic If Indicated (No Culture) - Urine, Clean Catch    Vitamin D,25-Hydroxy      12. Tendinitis of right forearm  Diclofenac Sodium (VOLTAREN) 1 % gel gel        Patient has type 2 diabetes is under excellent control.   Microalbumin levels will be checked at next visit.  His thyroid is therapeutic on the current dose of levothyroxine.  Hyperlipidemia is under good control on the current regimen.  Vitamin D is in normal range with supplementation.  He has neutrophilic leukocytosis evaluated by hematology and they felt this was related to smoking.  Patient did quit smoking.  He has benign paroxysmal positional vertigo treated with physical therapy and then improved.    Plan is as follows: No change in current medical regimen.  Patient will follow-up on or after November 20, 2025 with lab prior for his annual physical.  Otherwise follow-up as needed.        Procedures

## (undated) DEVICE — ENDOPATH PNEUMONEEDLE INSUFFLATION NEEDLES WITH LUER LOCK CONNECTORS 120MM: Brand: ENDOPATH

## (undated) DEVICE — LAPAROSCOPIC SMOKE FILTRATION SYSTEM: Brand: PALL LAPAROSHIELD® PLUS LAPAROSCOPIC SMOKE FILTRATION SYSTEM

## (undated) DEVICE — DRSNG TELFA PAD NONADH STR 1S 3X8IN

## (undated) DEVICE — ADHS SKIN PREMIERPRO EXOFIN TOPICAL HI/VISC .5ML

## (undated) DEVICE — TRAP FLD MINIVAC MEGADYNE 100ML

## (undated) DEVICE — TOTAL TRAY, 16FR 10ML SIL FOLEY, URN: Brand: MEDLINE

## (undated) DEVICE — ENDOPATH XCEL BLADELESS TROCARS WITH STABILITY SLEEVES: Brand: ENDOPATH XCEL

## (undated) DEVICE — LAG MINOR PROCEDURE: Brand: MEDLINE INDUSTRIES, INC.

## (undated) DEVICE — UNDYED BRAIDED (POLYGLACTIN 910), SYNTHETIC ABSORBABLE SUTURE: Brand: COATED VICRYL

## (undated) DEVICE — DRSNG SURESITE WNDW 4X4.5

## (undated) DEVICE — DECANTER: Brand: UNBRANDED

## (undated) DEVICE — METZENBAUM SCISSOR TIP, DISPOSABLE: Brand: RENEW

## (undated) DEVICE — TBG INSUFL W FLTR STRL

## (undated) DEVICE — TBG PENCL TELESCP MEGADYNE SMOKE EVAC 10FT

## (undated) DEVICE — PATIENT RETURN ELECTRODE, SINGLE-USE, CONTACT QUALITY MONITORING, ADULT, WITH 9FT CORD, FOR PATIENTS WEIGING OVER 33LBS. (15KG): Brand: MEGADYNE

## (undated) DEVICE — PK LAP GEN 90

## (undated) DEVICE — SUT NUROLON 3-0 SH-1 C503D

## (undated) DEVICE — SUT VIC 3/0 CTI 36IN J944H

## (undated) DEVICE — APPL HEMO ENDO SURGICEL 2IN1 1P/U STRL

## (undated) DEVICE — TROCARS: Brand: KII® BALLOON BLUNT TIP SYSTEM

## (undated) DEVICE — DRSNG SURESITE WNDW 2.38X2.75

## (undated) DEVICE — ENDOPATH XCEL UNIVERSAL TROCAR STABLILITY SLEEVES: Brand: ENDOPATH XCEL

## (undated) DEVICE — SOL IRR H2O BTL 1000ML STRL

## (undated) DEVICE — GLV SURG SENSICARE W/ALOE PF LF 6.5 STRL

## (undated) DEVICE — SUT NUROLON 0 CT1 CR8 18IN C521D

## (undated) DEVICE — 2, DISPOSABLE SUCTION/IRRIGATOR WITH DISPOSABLE TIP: Brand: STRYKEFLOW

## (undated) DEVICE — SPNG GZ 2S 2X2 8PLY STRL PK/2

## (undated) DEVICE — SUT MNCRYL 2/0 SH 27IN Y417H

## (undated) DEVICE — SUT VIC 0/0 UR6 27IN DYED J603H

## (undated) DEVICE — APPL CHLORAPREP HI/LITE 26ML ORNG

## (undated) DEVICE — LAPAROSCOPIC SCOPE WARMER: Brand: DEROYAL

## (undated) DEVICE — SAFELINER SUCTION CANISTER 1000CC: Brand: DEROYAL